# Patient Record
Sex: MALE | Race: WHITE | Employment: OTHER | URBAN - METROPOLITAN AREA
[De-identification: names, ages, dates, MRNs, and addresses within clinical notes are randomized per-mention and may not be internally consistent; named-entity substitution may affect disease eponyms.]

---

## 2023-02-26 ENCOUNTER — HOSPITAL ENCOUNTER (INPATIENT)
Age: 73
LOS: 9 days | Discharge: NURSING, REHAB OR LONG-TERM CARE | DRG: 023 | End: 2023-03-07
Attending: STUDENT IN AN ORGANIZED HEALTH CARE EDUCATION/TRAINING PROGRAM | Admitting: ANESTHESIOLOGY
Payer: MEDICARE

## 2023-02-26 ENCOUNTER — APPOINTMENT (OUTPATIENT)
Dept: GENERAL RADIOLOGY | Age: 73
End: 2023-02-26
Attending: EMERGENCY MEDICINE
Payer: MEDICARE

## 2023-02-26 ENCOUNTER — APPOINTMENT (OUTPATIENT)
Dept: VASCULAR SURGERY | Age: 73
DRG: 023 | End: 2023-02-26
Attending: NURSE PRACTITIONER
Payer: MEDICARE

## 2023-02-26 ENCOUNTER — ANESTHESIA (OUTPATIENT)
Dept: INTERVENTIONAL RADIOLOGY/VASCULAR | Age: 73
DRG: 023 | End: 2023-02-26
Payer: MEDICARE

## 2023-02-26 ENCOUNTER — HOSPITAL ENCOUNTER (EMERGENCY)
Dept: INTERVENTIONAL RADIOLOGY/VASCULAR | Age: 73
Discharge: HOME | DRG: 023 | End: 2023-02-26
Payer: MEDICARE

## 2023-02-26 ENCOUNTER — APPOINTMENT (OUTPATIENT)
Dept: CT IMAGING | Age: 73
End: 2023-02-26
Attending: EMERGENCY MEDICINE
Payer: MEDICARE

## 2023-02-26 ENCOUNTER — HOSPITAL ENCOUNTER (EMERGENCY)
Age: 73
Discharge: OTHER INSTITUTION | End: 2023-02-26
Attending: EMERGENCY MEDICINE
Payer: MEDICARE

## 2023-02-26 ENCOUNTER — APPOINTMENT (OUTPATIENT)
Dept: CT IMAGING | Age: 73
DRG: 023 | End: 2023-02-26
Attending: NURSE PRACTITIONER
Payer: MEDICARE

## 2023-02-26 ENCOUNTER — ANESTHESIA EVENT (OUTPATIENT)
Dept: INTERVENTIONAL RADIOLOGY/VASCULAR | Age: 73
DRG: 023 | End: 2023-02-26
Payer: MEDICARE

## 2023-02-26 VITALS
SYSTOLIC BLOOD PRESSURE: 130 MMHG | TEMPERATURE: 97.7 F | RESPIRATION RATE: 17 BRPM | DIASTOLIC BLOOD PRESSURE: 97 MMHG | HEART RATE: 81 BPM | OXYGEN SATURATION: 99 %

## 2023-02-26 VITALS
BODY MASS INDEX: 47.74 KG/M2 | WEIGHT: 315 LBS | HEART RATE: 84 BPM | RESPIRATION RATE: 18 BRPM | OXYGEN SATURATION: 99 % | HEIGHT: 68 IN | SYSTOLIC BLOOD PRESSURE: 149 MMHG | TEMPERATURE: 99.3 F | DIASTOLIC BLOOD PRESSURE: 92 MMHG

## 2023-02-26 DIAGNOSIS — I63.512 CEREBROVASCULAR ACCIDENT (CVA) DUE TO OCCLUSION OF LEFT MIDDLE CEREBRAL ARTERY (HCC): Primary | ICD-10-CM

## 2023-02-26 DIAGNOSIS — I63.50 CEREBROVASCULAR ACCIDENT (CVA) DUE TO OCCLUSION OF CEREBRAL ARTERY (HCC): Primary | ICD-10-CM

## 2023-02-26 PROBLEM — I63.9 STROKE (HCC): Status: ACTIVE | Noted: 2023-02-26

## 2023-02-26 LAB
ALBUMIN SERPL-MCNC: 3.5 G/DL (ref 3.5–5)
ALBUMIN/GLOB SERPL: 1.1 (ref 1.1–2.2)
ALP SERPL-CCNC: 79 U/L (ref 45–117)
ALT SERPL-CCNC: 26 U/L (ref 12–78)
ANION GAP SERPL CALC-SCNC: 5 MMOL/L (ref 5–15)
APTT PPP: 23.3 SEC (ref 21.2–34.1)
AST SERPL W P-5'-P-CCNC: ABNORMAL U/L (ref 15–37)
BASOPHILS # BLD: 0 K/UL (ref 0–0.1)
BASOPHILS NFR BLD: 0 % (ref 0–1)
BILIRUB SERPL-MCNC: 0.8 MG/DL (ref 0.2–1)
BUN SERPL-MCNC: 25 MG/DL (ref 6–20)
BUN/CREAT SERPL: 17 (ref 12–20)
CA-I BLD-MCNC: 9 MG/DL (ref 8.5–10.1)
CHLORIDE SERPL-SCNC: 110 MMOL/L (ref 97–108)
CHOLEST SERPL-MCNC: 178 MG/DL
CO2 SERPL-SCNC: 21 MMOL/L (ref 21–32)
CREAT SERPL-MCNC: 1.5 MG/DL (ref 0.7–1.3)
DIFFERENTIAL METHOD BLD: ABNORMAL
EOSINOPHIL # BLD: 0.1 K/UL (ref 0–0.4)
EOSINOPHIL NFR BLD: 1 % (ref 0–7)
ERYTHROCYTE [DISTWIDTH] IN BLOOD BY AUTOMATED COUNT: 12.8 % (ref 11.5–14.5)
EST. AVERAGE GLUCOSE BLD GHB EST-MCNC: 111 MG/DL
GLOBULIN SER CALC-MCNC: 3.2 G/DL (ref 2–4)
GLUCOSE SERPL-MCNC: 134 MG/DL (ref 65–100)
HBA1C MFR BLD: 5.5 % (ref 4–5.6)
HCT VFR BLD AUTO: 45.9 % (ref 36.6–50.3)
HDLC SERPL-MCNC: 42 MG/DL
HDLC SERPL: 4.2 (ref 0–5)
HGB BLD-MCNC: 15.6 G/DL (ref 12.1–17)
IMM GRANULOCYTES # BLD AUTO: 0.1 K/UL (ref 0–0.04)
IMM GRANULOCYTES NFR BLD AUTO: 1 % (ref 0–0.5)
INR PPP: 1 (ref 0.9–1.1)
LDLC SERPL CALC-MCNC: 107.2 MG/DL (ref 0–100)
LYMPHOCYTES # BLD: 0.7 K/UL (ref 0.8–3.5)
LYMPHOCYTES NFR BLD: 9 % (ref 12–49)
MCH RBC QN AUTO: 32 PG (ref 26–34)
MCHC RBC AUTO-ENTMCNC: 34 G/DL (ref 30–36.5)
MCV RBC AUTO: 94.3 FL (ref 80–99)
MONOCYTES # BLD: 0.4 K/UL (ref 0–1)
MONOCYTES NFR BLD: 5 % (ref 5–13)
NEUTS SEG # BLD: 7 K/UL (ref 1.8–8)
NEUTS SEG NFR BLD: 84 % (ref 32–75)
NRBC # BLD: 0 K/UL (ref 0–0.01)
NRBC BLD-RTO: 0 PER 100 WBC
PLATELET # BLD AUTO: 221 K/UL (ref 150–400)
PMV BLD AUTO: 9.1 FL (ref 8.9–12.9)
POTASSIUM SERPL-SCNC: ABNORMAL MMOL/L (ref 3.5–5.1)
PROT SERPL-MCNC: 6.7 G/DL (ref 6.4–8.2)
PROTHROMBIN TIME: 12.9 SEC (ref 11.9–14.6)
RBC # BLD AUTO: 4.87 M/UL (ref 4.1–5.7)
RBC MORPH BLD: ABNORMAL
SODIUM SERPL-SCNC: 136 MMOL/L (ref 136–145)
THERAPEUTIC RANGE,PTTT: NORMAL SEC (ref 82–109)
TRIGL SERPL-MCNC: 144 MG/DL (ref ?–150)
TROPONIN I SERPL HS-MCNC: 5 NG/L (ref 0–76)
TSH SERPL DL<=0.05 MIU/L-ACNC: 1.1 UIU/ML (ref 0.36–3.74)
VLDLC SERPL CALC-MCNC: 28.8 MG/DL
WBC # BLD AUTO: 8.3 K/UL (ref 4.1–11.1)

## 2023-02-26 PROCEDURE — 2709999900 HC NON-CHARGEABLE SUPPLY

## 2023-02-26 PROCEDURE — 74011250636 HC RX REV CODE- 250/636: Performed by: RADIOLOGY

## 2023-02-26 PROCEDURE — 71045 X-RAY EXAM CHEST 1 VIEW: CPT

## 2023-02-26 PROCEDURE — 83036 HEMOGLOBIN GLYCOSYLATED A1C: CPT

## 2023-02-26 PROCEDURE — 74011250636 HC RX REV CODE- 250/636: Performed by: NURSE ANESTHETIST, CERTIFIED REGISTERED

## 2023-02-26 PROCEDURE — 99285 EMERGENCY DEPT VISIT HI MDM: CPT

## 2023-02-26 PROCEDURE — C1894 INTRO/SHEATH, NON-LASER: HCPCS

## 2023-02-26 PROCEDURE — 77030025703 HC SYR ANGI VACLOK MRTM -A

## 2023-02-26 PROCEDURE — 74011250637 HC RX REV CODE- 250/637: Performed by: RADIOLOGY

## 2023-02-26 PROCEDURE — 4A03X5D MEASUREMENT OF ARTERIAL FLOW, INTRACRANIAL, EXTERNAL APPROACH: ICD-10-PCS | Performed by: RADIOLOGY

## 2023-02-26 PROCEDURE — 85610 PROTHROMBIN TIME: CPT

## 2023-02-26 PROCEDURE — 77030005402 HC CATH RAD ART LN KT TELE -B

## 2023-02-26 PROCEDURE — 84443 ASSAY THYROID STIM HORMONE: CPT

## 2023-02-26 PROCEDURE — 80053 COMPREHEN METABOLIC PANEL: CPT

## 2023-02-26 PROCEDURE — 99223 1ST HOSP IP/OBS HIGH 75: CPT | Performed by: RADIOLOGY

## 2023-02-26 PROCEDURE — 77030013797 HC KT TRNSDUC PRSSR EDWD -A

## 2023-02-26 PROCEDURE — 80061 LIPID PANEL: CPT

## 2023-02-26 PROCEDURE — 74011000250 HC RX REV CODE- 250: Performed by: ANESTHESIOLOGY

## 2023-02-26 PROCEDURE — 93880 EXTRACRANIAL BILAT STUDY: CPT

## 2023-02-26 PROCEDURE — 70450 CT HEAD/BRAIN W/O DYE: CPT

## 2023-02-26 PROCEDURE — C1760 CLOSURE DEV, VASC: HCPCS

## 2023-02-26 PROCEDURE — C1769 GUIDE WIRE: HCPCS

## 2023-02-26 PROCEDURE — 77030035304 HC CATH ANGI BENCHMARK PENU -G

## 2023-02-26 PROCEDURE — 74011250636 HC RX REV CODE- 250/636: Performed by: NURSE PRACTITIONER

## 2023-02-26 PROCEDURE — 74011000250 HC RX REV CODE- 250

## 2023-02-26 PROCEDURE — 65610000006 HC RM INTENSIVE CARE

## 2023-02-26 PROCEDURE — 74011000250 HC RX REV CODE- 250: Performed by: NURSE PRACTITIONER

## 2023-02-26 PROCEDURE — 94660 CPAP INITIATION&MGMT: CPT

## 2023-02-26 PROCEDURE — 84484 ASSAY OF TROPONIN QUANT: CPT

## 2023-02-26 PROCEDURE — 85730 THROMBOPLASTIN TIME PARTIAL: CPT

## 2023-02-26 PROCEDURE — 36415 COLL VENOUS BLD VENIPUNCTURE: CPT

## 2023-02-26 PROCEDURE — 74011250636 HC RX REV CODE- 250/636

## 2023-02-26 PROCEDURE — 93005 ELECTROCARDIOGRAM TRACING: CPT

## 2023-02-26 PROCEDURE — 77030012468 HC VLV BLEEDBK CNTRL ABBT -B

## 2023-02-26 PROCEDURE — 03CG3ZZ EXTIRPATION OF MATTER FROM INTRACRANIAL ARTERY, PERCUTANEOUS APPROACH: ICD-10-PCS | Performed by: PSYCHIATRY & NEUROLOGY

## 2023-02-26 PROCEDURE — 76060000033 HC ANESTHESIA 1 TO 1.5 HR

## 2023-02-26 PROCEDURE — 77030038563 HC TU ART PRESSR ICUM -Z

## 2023-02-26 PROCEDURE — 74011250637 HC RX REV CODE- 250/637: Performed by: EMERGENCY MEDICINE

## 2023-02-26 PROCEDURE — 77030008584 HC TOOL GDWRE DEV TERU -A

## 2023-02-26 PROCEDURE — 74011000636 HC RX REV CODE- 636: Performed by: EMERGENCY MEDICINE

## 2023-02-26 PROCEDURE — 74011000250 HC RX REV CODE- 250: Performed by: NURSE ANESTHETIST, CERTIFIED REGISTERED

## 2023-02-26 PROCEDURE — 94762 N-INVAS EAR/PLS OXIMTRY CONT: CPT

## 2023-02-26 PROCEDURE — 61645 PERQ ART M-THROMBECT &/NFS: CPT

## 2023-02-26 PROCEDURE — C1757 CATH, THROMBECTOMY/EMBOLECT: HCPCS

## 2023-02-26 PROCEDURE — 61645 PERQ ART M-THROMBECT &/NFS: CPT | Performed by: RADIOLOGY

## 2023-02-26 PROCEDURE — C1887 CATHETER, GUIDING: HCPCS

## 2023-02-26 PROCEDURE — 70496 CT ANGIOGRAPHY HEAD: CPT

## 2023-02-26 PROCEDURE — 85025 COMPLETE CBC W/AUTO DIFF WBC: CPT

## 2023-02-26 PROCEDURE — 74011000636 HC RX REV CODE- 636: Performed by: RADIOLOGY

## 2023-02-26 RX ORDER — SODIUM CHLORIDE 9 MG/ML
75 INJECTION, SOLUTION INTRAVENOUS CONTINUOUS
Status: DISCONTINUED | OUTPATIENT
Start: 2023-02-26 | End: 2023-02-27

## 2023-02-26 RX ORDER — LABETALOL HYDROCHLORIDE 5 MG/ML
20 INJECTION, SOLUTION INTRAVENOUS ONCE
Status: DISCONTINUED | OUTPATIENT
Start: 2023-02-26 | End: 2023-02-26

## 2023-02-26 RX ORDER — POLYETHYLENE GLYCOL 3350 17 G/17G
17 POWDER, FOR SOLUTION ORAL DAILY PRN
Status: DISCONTINUED | OUTPATIENT
Start: 2023-02-26 | End: 2023-03-07 | Stop reason: HOSPADM

## 2023-02-26 RX ORDER — LABETALOL HYDROCHLORIDE 5 MG/ML
10 INJECTION, SOLUTION INTRAVENOUS
Status: DISCONTINUED | OUTPATIENT
Start: 2023-02-26 | End: 2023-02-26

## 2023-02-26 RX ORDER — LABETALOL HYDROCHLORIDE 5 MG/ML
20 INJECTION, SOLUTION INTRAVENOUS
Status: DISCONTINUED | OUTPATIENT
Start: 2023-02-26 | End: 2023-03-01

## 2023-02-26 RX ORDER — SODIUM CHLORIDE 9 MG/ML
INJECTION, SOLUTION INTRAVENOUS
Status: DISCONTINUED | OUTPATIENT
Start: 2023-02-26 | End: 2023-02-26 | Stop reason: HOSPADM

## 2023-02-26 RX ORDER — ASPIRIN 300 MG/1
300 SUPPOSITORY RECTAL
Status: COMPLETED | OUTPATIENT
Start: 2023-02-26 | End: 2023-02-26

## 2023-02-26 RX ORDER — ROCURONIUM BROMIDE 10 MG/ML
INJECTION, SOLUTION INTRAVENOUS AS NEEDED
Status: DISCONTINUED | OUTPATIENT
Start: 2023-02-26 | End: 2023-02-26 | Stop reason: HOSPADM

## 2023-02-26 RX ORDER — ASPIRIN 300 MG/1
300 SUPPOSITORY RECTAL DAILY
Status: DISCONTINUED | OUTPATIENT
Start: 2023-02-27 | End: 2023-02-28

## 2023-02-26 RX ORDER — ACETAMINOPHEN 650 MG/1
650 SUPPOSITORY RECTAL
Status: DISCONTINUED | OUTPATIENT
Start: 2023-02-26 | End: 2023-03-07 | Stop reason: HOSPADM

## 2023-02-26 RX ORDER — SODIUM CHLORIDE 0.9 % (FLUSH) 0.9 %
5-40 SYRINGE (ML) INJECTION AS NEEDED
Status: DISCONTINUED | OUTPATIENT
Start: 2023-02-26 | End: 2023-03-07 | Stop reason: HOSPADM

## 2023-02-26 RX ORDER — ENOXAPARIN SODIUM 100 MG/ML
40 INJECTION SUBCUTANEOUS DAILY
Status: DISCONTINUED | OUTPATIENT
Start: 2023-02-27 | End: 2023-02-26 | Stop reason: DRUGHIGH

## 2023-02-26 RX ORDER — ONDANSETRON 2 MG/ML
4 INJECTION INTRAMUSCULAR; INTRAVENOUS
Status: DISCONTINUED | OUTPATIENT
Start: 2023-02-26 | End: 2023-03-07 | Stop reason: HOSPADM

## 2023-02-26 RX ORDER — ENOXAPARIN SODIUM 100 MG/ML
30 INJECTION SUBCUTANEOUS EVERY 12 HOURS
Status: DISCONTINUED | OUTPATIENT
Start: 2023-02-27 | End: 2023-03-07 | Stop reason: HOSPADM

## 2023-02-26 RX ORDER — HYDRALAZINE HYDROCHLORIDE 20 MG/ML
20 INJECTION INTRAMUSCULAR; INTRAVENOUS
Status: DISCONTINUED | OUTPATIENT
Start: 2023-02-26 | End: 2023-03-07 | Stop reason: HOSPADM

## 2023-02-26 RX ORDER — SODIUM CHLORIDE 0.9 % (FLUSH) 0.9 %
5-40 SYRINGE (ML) INJECTION EVERY 8 HOURS
Status: DISCONTINUED | OUTPATIENT
Start: 2023-02-26 | End: 2023-03-07 | Stop reason: HOSPADM

## 2023-02-26 RX ORDER — LIDOCAINE HYDROCHLORIDE 20 MG/ML
INJECTION, SOLUTION EPIDURAL; INFILTRATION; INTRACAUDAL; PERINEURAL AS NEEDED
Status: DISCONTINUED | OUTPATIENT
Start: 2023-02-26 | End: 2023-02-26 | Stop reason: HOSPADM

## 2023-02-26 RX ORDER — ACETAMINOPHEN 325 MG/1
650 TABLET ORAL
Status: DISCONTINUED | OUTPATIENT
Start: 2023-02-26 | End: 2023-03-07 | Stop reason: HOSPADM

## 2023-02-26 RX ORDER — LIDOCAINE HYDROCHLORIDE 10 MG/ML
INJECTION INFILTRATION; PERINEURAL
Status: COMPLETED
Start: 2023-02-26 | End: 2023-02-26

## 2023-02-26 RX ORDER — PHENYLEPHRINE HCL IN 0.9% NACL 0.4MG/10ML
SYRINGE (ML) INTRAVENOUS
Status: DISCONTINUED | OUTPATIENT
Start: 2023-02-26 | End: 2023-02-26 | Stop reason: HOSPADM

## 2023-02-26 RX ORDER — VERAPAMIL HYDROCHLORIDE 2.5 MG/ML
10 INJECTION, SOLUTION INTRAVENOUS
Status: ACTIVE | OUTPATIENT
Start: 2023-02-26 | End: 2023-02-26

## 2023-02-26 RX ORDER — ONDANSETRON 4 MG/1
4 TABLET, ORALLY DISINTEGRATING ORAL
Status: DISCONTINUED | OUTPATIENT
Start: 2023-02-26 | End: 2023-03-07 | Stop reason: HOSPADM

## 2023-02-26 RX ORDER — LABETALOL HCL 20 MG/4 ML
10 SYRINGE (ML) INTRAVENOUS
Status: DISCONTINUED | OUTPATIENT
Start: 2023-02-26 | End: 2023-02-26

## 2023-02-26 RX ORDER — ESMOLOL HYDROCHLORIDE 10 MG/ML
INJECTION INTRAVENOUS AS NEEDED
Status: DISCONTINUED | OUTPATIENT
Start: 2023-02-26 | End: 2023-02-26 | Stop reason: HOSPADM

## 2023-02-26 RX ORDER — SUCCINYLCHOLINE CHLORIDE 20 MG/ML
INJECTION INTRAMUSCULAR; INTRAVENOUS AS NEEDED
Status: DISCONTINUED | OUTPATIENT
Start: 2023-02-26 | End: 2023-02-26 | Stop reason: HOSPADM

## 2023-02-26 RX ORDER — LIDOCAINE HYDROCHLORIDE 10 MG/ML
10 INJECTION INFILTRATION; PERINEURAL
Status: ACTIVE | OUTPATIENT
Start: 2023-02-26 | End: 2023-02-26

## 2023-02-26 RX ORDER — PROPOFOL 10 MG/ML
INJECTION, EMULSION INTRAVENOUS AS NEEDED
Status: DISCONTINUED | OUTPATIENT
Start: 2023-02-26 | End: 2023-02-26 | Stop reason: HOSPADM

## 2023-02-26 RX ORDER — ASPIRIN 300 MG/1
600 SUPPOSITORY RECTAL
Status: COMPLETED | OUTPATIENT
Start: 2023-02-26 | End: 2023-02-26

## 2023-02-26 RX ADMIN — SODIUM CHLORIDE: 900 INJECTION, SOLUTION INTRAVENOUS at 10:42

## 2023-02-26 RX ADMIN — Medication 4000 UNITS: at 11:00

## 2023-02-26 RX ADMIN — LIDOCAINE HYDROCHLORIDE 8 ML: 10 INJECTION, SOLUTION INFILTRATION; PERINEURAL at 11:46

## 2023-02-26 RX ADMIN — SODIUM CHLORIDE, PRESERVATIVE FREE 10 ML: 5 INJECTION INTRAVENOUS at 13:38

## 2023-02-26 RX ADMIN — NICARDIPINE HYDROCHLORIDE 15 MG/HR: 25 INJECTION, SOLUTION INTRAVENOUS at 20:45

## 2023-02-26 RX ADMIN — Medication 4000 UNITS: at 10:57

## 2023-02-26 RX ADMIN — Medication 4000 UNITS: at 10:58

## 2023-02-26 RX ADMIN — LABETALOL HYDROCHLORIDE 10 MG: 5 INJECTION INTRAVENOUS at 20:13

## 2023-02-26 RX ADMIN — Medication 4000 UNITS: at 11:01

## 2023-02-26 RX ADMIN — NICARDIPINE HYDROCHLORIDE 15 MG/HR: 25 INJECTION, SOLUTION INTRAVENOUS at 18:47

## 2023-02-26 RX ADMIN — ASPIRIN 300 MG: 300 SUPPOSITORY RECTAL at 09:18

## 2023-02-26 RX ADMIN — LIDOCAINE HYDROCHLORIDE 100 MG: 20 INJECTION, SOLUTION EPIDURAL; INFILTRATION; INTRACAUDAL; PERINEURAL at 10:45

## 2023-02-26 RX ADMIN — ROCURONIUM BROMIDE 10 MG: 10 SOLUTION INTRAVENOUS at 10:45

## 2023-02-26 RX ADMIN — SODIUM CHLORIDE, PRESERVATIVE FREE 10 ML: 5 INJECTION INTRAVENOUS at 22:22

## 2023-02-26 RX ADMIN — Medication 4000 UNITS: at 11:02

## 2023-02-26 RX ADMIN — HYDRALAZINE HYDROCHLORIDE 20 MG: 20 INJECTION INTRAMUSCULAR; INTRAVENOUS at 22:22

## 2023-02-26 RX ADMIN — NICARDIPINE HYDROCHLORIDE 5 MG/HR: 25 INJECTION, SOLUTION INTRAVENOUS at 12:54

## 2023-02-26 RX ADMIN — NICARDIPINE HYDROCHLORIDE 15 MG/HR: 25 INJECTION, SOLUTION INTRAVENOUS at 22:45

## 2023-02-26 RX ADMIN — Medication 4000 UNITS: at 10:59

## 2023-02-26 RX ADMIN — SODIUM CHLORIDE 75 ML/HR: 9 INJECTION, SOLUTION INTRAVENOUS at 13:52

## 2023-02-26 RX ADMIN — PROPOFOL 50 MG: 10 INJECTION, EMULSION INTRAVENOUS at 11:37

## 2023-02-26 RX ADMIN — HYDRALAZINE HYDROCHLORIDE 20 MG: 20 INJECTION INTRAMUSCULAR; INTRAVENOUS at 16:19

## 2023-02-26 RX ADMIN — PROPOFOL 200 MG: 10 INJECTION, EMULSION INTRAVENOUS at 10:45

## 2023-02-26 RX ADMIN — IOPAMIDOL 82 ML: 612 INJECTION, SOLUTION INTRAVENOUS at 11:42

## 2023-02-26 RX ADMIN — SUCCINYLCHOLINE CHLORIDE 200 MG: 20 INJECTION, SOLUTION INTRAMUSCULAR; INTRAVENOUS at 10:45

## 2023-02-26 RX ADMIN — ESMOLOL HYDROCHLORIDE 30 MG: 10 INJECTION, SOLUTION INTRAVENOUS at 11:58

## 2023-02-26 RX ADMIN — IOPAMIDOL 100 ML: 755 INJECTION, SOLUTION INTRAVENOUS at 08:26

## 2023-02-26 RX ADMIN — Medication 25 MCG/MIN: at 11:05

## 2023-02-26 RX ADMIN — ESMOLOL HYDROCHLORIDE 30 MG: 10 INJECTION, SOLUTION INTRAVENOUS at 12:09

## 2023-02-26 RX ADMIN — SODIUM CHLORIDE, PRESERVATIVE FREE 10 ML: 5 INJECTION INTRAVENOUS at 22:23

## 2023-02-26 RX ADMIN — NICARDIPINE HYDROCHLORIDE 15 MG/HR: 25 INJECTION, SOLUTION INTRAVENOUS at 17:10

## 2023-02-26 RX ADMIN — NICARDIPINE HYDROCHLORIDE 15 MG/HR: 25 INJECTION, SOLUTION INTRAVENOUS at 15:18

## 2023-02-26 RX ADMIN — ASPIRIN 600 MG: 300 SUPPOSITORY RECTAL at 10:59

## 2023-02-26 NOTE — ED TRIAGE NOTES
Arrives to ED with right sided weakness/ flaccid, garbled speech. ASIF Dash [id number] when patient talked to wife on phone, was found on floor leaned against couch at 0630 this morning by wife.      Bg 134

## 2023-02-26 NOTE — ED PROVIDER NOTES
EMERGENCY DEPARTMENT HISTORY AND PHYSICAL EXAM      Date: 2/26/2023  Patient Name: Rebecca Morocho III    History of Presenting Illness     Chief Complaint   Patient presents with    Extremity Weakness       History Provided By: EMS    HPI: Rafy Jacobson, 81 y.o. male with a past medical history significant hypertension presents to the ED with chief complaint of Extremity Weakness  . 26-year-old male spoke to his wife at 11:45 PM last night and was well went to sleep upon her returning from work around 6:37 AM she noted him to be on the ground slumped over altered with new weakness. EMS was called. Presents as a level 2 stroke alert. Patient's not on anticoagulation. There are no other complaints, changes, or physical findings at this time. PCP: None        Past History     Past Medical History:  No past medical history on file. Past Surgical History:  No past surgical history on file. Family History:  No family history on file. Social History: Allergies:  No Known Allergies      Review of Systems   Review of Systems   Constitutional: Negative. Negative for chills, fatigue and fever. HENT: Negative. Negative for congestion, ear pain, nosebleeds and sore throat. Eyes: Negative. Negative for pain, discharge and visual disturbance. Respiratory: Negative. Negative for cough, chest tightness and shortness of breath. Cardiovascular: Negative. Negative for chest pain and leg swelling. Gastrointestinal: Negative. Negative for abdominal pain, blood in stool, constipation, diarrhea, nausea and vomiting. Endocrine: Negative. Genitourinary: Negative. Negative for difficulty urinating, dysuria and flank pain. Musculoskeletal: Negative. Negative for back pain and myalgias. Skin: Negative. Negative for rash and wound. Allergic/Immunologic: Negative. Neurological:  Positive for speech difficulty, weakness and numbness.  Negative for dizziness, syncope and headaches. Hematological: Negative. Does not bruise/bleed easily. Psychiatric/Behavioral: Negative. Negative for agitation, confusion, hallucinations and suicidal ideas. All other systems reviewed and are negative. Positives and Pertinent negatives as per HPI. Physical Exam   Patient Vitals for the past 24 hrs:   Temp Pulse Resp BP SpO2   02/26/23 0923 -- 84 18 (!) 149/92 99 %   02/26/23 0831 -- 83 15 (!) 143/80 98 %   02/26/23 0804 99.3 °F (37.4 °C) 86 18 (!) 155/82 98 %         Physical Exam  Vitals and nursing note reviewed. Constitutional:       General: He is not in acute distress. Appearance: He is normal weight. He is not ill-appearing. HENT:      Head: Normocephalic and atraumatic. Right Ear: External ear normal.      Left Ear: External ear normal.      Nose: Nose normal. No rhinorrhea. Mouth/Throat:      Mouth: Mucous membranes are moist.      Pharynx: Oropharynx is clear. Eyes:      Extraocular Movements: Extraocular movements intact. Conjunctiva/sclera: Conjunctivae normal.      Pupils: Pupils are equal, round, and reactive to light. Cardiovascular:      Rate and Rhythm: Normal rate and regular rhythm. Pulses: Normal pulses. Heart sounds: Normal heart sounds. Pulmonary:      Effort: Pulmonary effort is normal. No respiratory distress. Breath sounds: Normal breath sounds. Abdominal:      General: Abdomen is flat. Bowel sounds are normal.      Palpations: Abdomen is soft. Musculoskeletal:         General: No tenderness or deformity. Normal range of motion. Cervical back: Normal range of motion and neck supple. Skin:     General: Skin is warm and dry. Capillary Refill: Capillary refill takes less than 2 seconds. Findings: No bruising, lesion or rash. Neurological:      General: No focal deficit present. Mental Status: He is alert and oriented to person, place, and time. Mental status is at baseline.       Comments: ASIF sided weakness. Dysarthria facial droop   Psychiatric:         Mood and Affect: Mood normal.         Behavior: Behavior normal.         Thought Content: Thought content normal.         Judgment: Judgment normal.       Diagnostic Study Results     LABS:   Recent Results (from the past 12 hour(s))   CBC WITH AUTOMATED DIFF    Collection Time: 02/26/23  7:58 AM   Result Value Ref Range    WBC 8.3 4.1 - 11.1 K/uL    RBC 4.87 4.10 - 5.70 M/uL    HGB 15.6 12.1 - 17.0 g/dL    HCT 45.9 36.6 - 50.3 %    MCV 94.3 80.0 - 99.0 FL    MCH 32.0 26.0 - 34.0 PG    MCHC 34.0 30.0 - 36.5 g/dL    RDW 12.8 11.5 - 14.5 %    PLATELET 973 324 - 031 K/uL    MPV 9.1 8.9 - 12.9 FL    NRBC 0.0 0.0  WBC    ABSOLUTE NRBC 0.00 0.00 - 0.01 K/uL    NEUTROPHILS 84 (H) 32 - 75 %    LYMPHOCYTES 9 (L) 12 - 49 %    MONOCYTES 5 5 - 13 %    EOSINOPHILS 1 0 - 7 %    BASOPHILS 0 0 - 1 %    IMMATURE GRANULOCYTES 1 (H) 0 - 0.5 %    ABS. NEUTROPHILS 7.0 1.8 - 8.0 K/UL    ABS. LYMPHOCYTES 0.7 (L) 0.8 - 3.5 K/UL    ABS. MONOCYTES 0.4 0.0 - 1.0 K/UL    ABS. EOSINOPHILS 0.1 0.0 - 0.4 K/UL    ABS. BASOPHILS 0.0 0.0 - 0.1 K/UL    ABS. IMM. GRANS. 0.1 (H) 0.00 - 0.04 K/UL    DF AUTOMATED      RBC COMMENTS Normocytic, Normochromic     METABOLIC PANEL, COMPREHENSIVE    Collection Time: 02/26/23  7:58 AM   Result Value Ref Range    Sodium 136 136 - 145 mmol/L    Potassium Hemolyzed, recollect requested 3.5 - 5.1 mmol/L    Chloride 110 (H) 97 - 108 mmol/L    CO2 21 21 - 32 mmol/L    Anion gap 5 5 - 15 mmol/L    Glucose 134 (H) 65 - 100 mg/dL    BUN 25 (H) 6 - 20 mg/dL    Creatinine 1.50 (H) 0.70 - 1.30 mg/dL    BUN/Creatinine ratio 17 12 - 20      eGFR 50 (L) >60 ml/min/1.73m2    Calcium 9.0 8.5 - 10.1 mg/dL    Bilirubin, total 0.8 0.2 - 1.0 mg/dL    AST (SGOT) Hemolyzed, recollect requested 15 - 37 U/L    ALT (SGPT) 26 12 - 78 U/L    Alk.  phosphatase 79 45 - 117 U/L    Protein, total 6.7 6.4 - 8.2 g/dL    Albumin 3.5 3.5 - 5.0 g/dL    Globulin 3.2 2.0 - 4.0 g/dL A-G Ratio 1.1 1.1 - 2.2     PROTHROMBIN TIME + INR    Collection Time: 02/26/23  7:58 AM   Result Value Ref Range    Prothrombin time 12.9 11.9 - 14.6 sec    INR 1.0 0.9 - 1.1     PTT    Collection Time: 02/26/23  7:58 AM   Result Value Ref Range    aPTT 23.3 21.2 - 34.1 sec    aPTT, therapeutic range   82 - 109 sec   TROPONIN-HIGH SENSITIVITY    Collection Time: 02/26/23  7:58 AM   Result Value Ref Range    Troponin-High Sensitivity 5 0 - 76 ng/L        EKG: EKG interpreted independently by ER physician. EKG at 7:52 AM normal sinus rhythm rate of 80. No ST changes. No T wave inversions. Normal intervals. Reasonable dysrhythmia. Interpreted by ER physician. RADIOLOGY:  Non-plain film images such as CT, Ultrasound and MRI are read by the radiologist. Plain radiographic images are visualized and preliminarily interpreted by the ED Provider with the below findings:     Chest x-ray reviewed independently by ER physician. No evidence of pneumonia, pleural effusion, rib fracture or pneumothorax. No widened mediastinum. Negative acute. I do agree with radiology interpretation. Interpretation per the Radiologist below, if available at the time of this note:     XR CHEST PORT    Result Date: 2/26/2023  INDICATION: Infarct. Portable AP view of the chest. There is no prior study for direct comparison. Cardiomediastinal silhouette is prominent, likely secondary to patient rotation. Lungs are grossly clear bilaterally. Pleural spaces are normal and there is no pneumothorax. Osseous structures are intact. Grossly clear lungs. CTA CODE NEURO HEAD AND NECK W CONT    Result Date: 2/26/2023  *PRELIMINARY REPORT* Thrombus in the distal left middle cerebral artery. Acute versus subacute infarct in the left MCA territory involves left basal ganglia, left insula, left frontal lobe, and left temporal lobe. Severe stenosis of the proximal left internal carotid artery.  Preliminary report was provided by  Joaquín, the on-call radiologist, at 0912 hours Final report to follow. *END PRELIMINARY REPORT* EXAM: CTA CODE NEURO HEAD AND NECK W CONT INDICATION: Code Stroke COMPARISON: None. CONTRAST: 100 mL of Isovue-370. TECHNIQUE:  Unenhanced  images were obtained to localize the volume for acquisition. Multislice helical axial CT angiography was performed from the aortic arch to the top of the head during uneventful rapid bolus intravenous contrast administration. Coronal and sagittal reformations and 3D post processing was performed. CT dose reduction was achieved through use of a standardized protocol tailored for this examination and automatic exposure control for dose modulation. This study was analyzed by the [address] ai algorithm. FINDINGS: DELAYED ENHANCEMENT HEAD CT: No abnormal parenchymal enhancement. No mass effect or midline shift. Left MCA infarction. CTA NECK: Great vessels: Normal arch anatomy with the origins patent. Right subclavian artery: Patent Left subclavian artery: Patent Right common carotid artery: Patent Left common carotid artery: Patent Cervical right internal carotid artery: Patent with less than 50% stenosis by NASCET criteria. Cervical left internal carotid artery: Patent with greater than 90% stenosis by NASCET criteria. Right vertebral artery: Patent Left vertebral artery: Patent The lung apices are clear. The thyroid is homogeneous. No cervical lymphadenopathy. CTA HEAD: Right cavernous internal carotid artery: Patent Left cavernous internal carotid artery: Patent Anterior cerebral arteries: Patent Anterior communicating artery: Patent Right middle cerebral artery: Patent Left middle cerebral artery: Occlusion of the distal left MCA at distal M1 segment. Small amount of thrombus in the M2 segment within sylvian fissure.  Posterior communicating arteries: Patent Posterior cerebral arteries: Patent Basilar artery: Patent Distal vertebral arteries: Patent No evidence for intracranial aneurysm or hemodynamically significant stenosis. 1.  Occlusion of distal M1 segment of left MCA. 2.  Left MCA territory infarction. 3.  Severe stenosis left cervical ICA. Mild stenosis right cervical ICA. CT CODE NEURO HEAD WO CONTRAST    Result Date: 2/26/2023  EXAM:  CT CODE NEURO HEAD WO CONTRAST INDICATION: Right extremity weakness and difficulty speaking. Last known well at 11:45 PM last night. COMPARISON: None TECHNIQUE: Noncontrast head CT. Coronal and sagittal reformats. CT dose reduction was achieved through the use of a standardized protocol tailored for this examination and automatic exposure control for dose modulation. FINDINGS: The ventricles and sulci are age-appropriate without hydrocephalus. There is no mass effect or midline shift. There is no intracranial hemorrhage or extra-axial fluid collection. Hypoattenuation in the left temporal lobe and left insula includes loss of gray-white differentiation. Dense left MCA sign. The calvarium is intact. The visualized paranasal sinuses and mastoid air cells are clear. CT evidence of left MCA territory infarct and probable MCA thrombus. No intracranial hemorrhage. I discussed this impression with SHARAD Leblanc at 0840 hours on 2/26/2023. Message posted on [employer] smiley. CT Results  (Last 48 hours)                 02/26/23 0826  CTA CODE NEURO HEAD AND NECK W CONT Final result    Impression:  1. Occlusion of distal M1 segment of left MCA. 2.  Left MCA territory infarction. 3.  Severe stenosis left cervical ICA. Mild stenosis right cervical ICA. Narrative:  *PRELIMINARY REPORT*       Thrombus in the distal left middle cerebral artery. Acute versus subacute   infarct in the left MCA territory involves left basal ganglia, left insula, left   frontal lobe, and left temporal lobe. Severe stenosis of the proximal left   internal carotid artery.        Preliminary report was provided by Dr. Milla Fisher, the on-call radiologist, at 0912   hours       Final report to follow. *END PRELIMINARY REPORT*       EXAM: CTA CODE NEURO HEAD AND NECK W CONT       INDICATION: Code Stroke       COMPARISON: None. CONTRAST: 100 mL of Isovue-370. TECHNIQUE:  Unenhanced  images were obtained to localize the volume for   acquisition. Multislice helical axial CT angiography was performed from the   aortic arch to the top of the head during uneventful rapid bolus intravenous   contrast administration. Coronal and sagittal reformations and 3D post   processing was performed. CT dose reduction was achieved through use of a   standardized protocol tailored for this examination and automatic exposure   control for dose modulation. This study was analyzed by the [address] ai algorithm. FINDINGS:       DELAYED ENHANCEMENT HEAD CT:    No abnormal parenchymal enhancement. No mass effect or midline shift. Left MCA   infarction. CTA NECK:   Great vessels: Normal arch anatomy with the origins patent. Right subclavian artery: Patent   Left subclavian artery: Patent    Right common carotid artery: Patent    Left common carotid artery: Patent    Cervical right internal carotid artery: Patent with less than 50% stenosis by   NASCET criteria. Cervical left internal carotid artery: Patent with greater than 90% stenosis by   NASCET criteria. Right vertebral artery: Patent   Left vertebral artery: Patent   The lung apices are clear. The thyroid is homogeneous. No cervical   lymphadenopathy. CTA HEAD:   Right cavernous internal carotid artery: Patent   Left cavernous internal carotid artery: Patent   Anterior cerebral arteries: Patent   Anterior communicating artery: Patent   Right middle cerebral artery: Patent   Left middle cerebral artery: Occlusion of the distal left MCA at distal M1   segment. Small amount of thrombus in the M2 segment within sylvian fissure.    Posterior communicating arteries: Patent   Posterior cerebral arteries: Patent   Basilar artery: Patent   Distal vertebral arteries: Patent   No evidence for intracranial aneurysm or hemodynamically significant stenosis. 02/26/23 4560  CT CODE NEURO HEAD WO CONTRAST Final result    Impression:      CT evidence of left MCA territory infarct and probable MCA thrombus. No   intracranial hemorrhage. I discussed this impression with SHARAD Capps at 0840 hours on 2/26/2023. Message   posted on [employer] smile[employer]. Narrative:  EXAM:  CT CODE NEURO HEAD WO CONTRAST       INDICATION: Right extremity weakness and difficulty speaking. Last known well at   11:45 PM last night. COMPARISON: None       TECHNIQUE: Noncontrast head CT. Coronal and sagittal reformats. CT dose   reduction was achieved through the use of a standardized protocol tailored for   this examination and automatic exposure control for dose modulation. FINDINGS: The ventricles and sulci are age-appropriate without hydrocephalus. There is no mass effect or midline shift. There is no intracranial hemorrhage or   extra-axial fluid collection. Hypoattenuation in the left temporal lobe and left   insula includes loss of gray-white differentiation. Dense left MCA sign. The calvarium is intact. The visualized paranasal sinuses and mastoid air cells   are clear. CXR Results  (Last 48 hours)                 02/26/23 0908  XR CHEST PORT Final result    Impression:  Grossly clear lungs. Narrative:  INDICATION: Infarct. Portable AP view of the chest.       There is no prior study for direct comparison. Cardiomediastinal silhouette is prominent, likely secondary to patient rotation. Lungs are grossly clear bilaterally. Pleural spaces are normal and there is no   pneumothorax. Osseous structures are intact.                    Medical Decision Making and ED Course       CC/HPI Summary, DDx, ED Course, and Reassessment: 72-year-old male presents with new right-sided weakness facial droop slurred speech concerning for a stroke. Based on time he is a level 2 stroke alert. Differential includes mass versus ICH versus stroke versus large vessel occlusion. Plan for lab work including CBC CMP troponin CT head Noncon CTA head and neck neuro stroke protocol. We will consult neurology immediately. These orders were placed during the ER evaluation:  Orders Placed This Encounter    CT CODE NEURO HEAD WO CONTRAST     Standing Status:   Standing     Number of Occurrences:   1     Order Specific Question:   Transport     Answer:   Stretcher [5]     Order Specific Question:   Reason for Exam     Answer:   stroke alert     Order Specific Question:   Decision Support Exception     Answer:   Emergency Medical Condition (MA) [1]    CTA CODE NEURO HEAD AND NECK W CONT     Standing Status:   Standing     Number of Occurrences:   1     Order Specific Question:   Does patient have history of Renal Disease?      Answer:   No     Order Specific Question:   Reason for Exam     Answer:   Code Stroke     Order Specific Question:   Transport     Answer:   Stretcher [5]     Order Specific Question:   Decision Support Exception     Answer:   Emergency Medical Condition (MA) [1]    XR CHEST PORT     Standing Status:   Standing     Number of Occurrences:   1     Order Specific Question:   Reason for Exam     Answer:   CVA    IR PERC ART MECH THROMB INFUSION INTRACRANIAL     Standing Status:   Standing     Number of Occurrences:   1     Order Specific Question:   Transport     Answer:   Stretcher [5]     Order Specific Question:   Reason for Exam     Answer:   Stroke    CBC WITH AUTOMATED DIFF     Standing Status:   Standing     Number of Occurrences:   1    METABOLIC PANEL, COMPREHENSIVE     Standing Status:   Standing     Number of Occurrences:   1    PT + INR     Standing Status:   Standing     Number of Occurrences:   1    PTT     Standing Status:   Standing     Number of Occurrences:   1    TROPONIN-HIGH SENSITIVITY     Standing Status:   Standing     Number of Occurrences:   1    DIET NPO     Standing Status:   Standing     Number of Occurrences:   1    NURSING-MISCELLANEOUS: Notify Charge Nurse for immediate rooming. ONE TIME     Standing Status:   Standing     Number of Occurrences:   1     Order Specific Question:   Description of Order:     Answer:   Notify Charge Nurse for immediate rooming. NIH STROKE SCALE ASSESSMENT(FIRST Neuro Assessment)     Standing Status:   Standing     Number of Occurrences:   1    NURSING ASSESSMENT:  SPECIFY Neuro Checks ONE TIME STAT     Standing Status:   Standing     Number of Occurrences:   1     Order Specific Question:   Please describe the test or procedure you would like to order. Answer:   Neuro Checks    NOTIFY PROVIDER: SPECIFY ONE TIME STAT     Standing Status:   Standing     Number of Occurrences:   1    VITAL SIGNS (Per Protocol)     Standing Status:   Standing     Number of Occurrences:   1    POC GLUCOSE     Standing Status:   Standing     Number of Occurrences:   1    CARDIAC MONITOR - ED ONLY     Standing Status:   Standing     Number of Occurrences:   1     Order Specific Question:   Type:      Answer:   Bedside    PULSE OXIMETRY CONTINUOUS     Standing Status:   Standing     Number of Occurrences:   1    VITAL SIGNS     Standing Status:   Standing     Number of Occurrences:   1    INITIATE NURSING DYSPHAGIA SCREENING     Standing Status:   Standing     Number of Occurrences:   1    MEASURE HEIGHT     Standing Status:   Standing     Number of Occurrences:   1    WEIGH PATIENT     Standing Status:   Standing     Number of Occurrences:   1    NEUROLOGIC STATUS ASSESSMENT     Standing Status:   Standing     Number of Occurrences:   1    NIH STROKE SCALE ASSESSMENT     Standing Status:   Standing     Number of Occurrences:   1    ELEVATE HEAD OF BED     Standing Status:   Standing     Number of Occurrences:   1     Order Specific Question:   Degrees of Elevation     Answer:   30    OXYGEN CANNULA     Titrate up to 4l/minute to maintain oxygen saturations at 90% or greater     Standing Status:   Standing     Number of Occurrences:   1     Order Specific Question:   Liters per minute: Answer:   2     Order Specific Question:   Indications for O2 therapy     Answer:   OTHER    POC GLUCOSE     Standing Status:   Standing     Number of Occurrences:   1    EKG, 12 LEAD, INITIAL     Standing Status:   Standing     Number of Occurrences:   1     Order Specific Question:   Reason for Exam:     Answer:   CVA    SAMPLE TO BLOOD BANK     Standing Status:   Standing     Number of Occurrences:   1    DYSPHAGIA SCREENING Prior to PO     Standing Status:   Standing     Number of Occurrences:   1    SALINE LOCK IV ONE TIME STAT     Standing Status:   Standing     Number of Occurrences:   1    SALINE LOCK IV ONE TIME STAT     Standing Status:   Standing     Number of Occurrences:   1    iopamidoL (ISOVUE-370) 370 mg iodine /mL (76 %) injection 100 mL    aspirin (ASA) suppository 300 mg        Patient was given the following medications:  Medications   iopamidoL (ISOVUE-370) 370 mg iodine /mL (76 %) injection 100 mL (100 mL IntraVENous Given 2/26/23 0826)   aspirin (ASA) suppository 300 mg (300 mg Rectal Given 2/26/23 0918)           ED Course:              Vital Signs-Reviewed the patient's vital signs. Patient Vitals for the past 24 hrs:   Temp Pulse Resp BP SpO2   02/26/23 0923 -- 84 18 (!) 149/92 99 %   02/26/23 0831 -- 83 15 (!) 143/80 98 %   02/26/23 0804 99.3 °F (37.4 °C) 86 18 (!) 155/82 98 %     Vitals interpreted independently by ER physician. stable    Medical decision making tools:       Nih:    2/26/2023    08:05:28   NIH Stroke Scale     Interval Baseline   Level of Conciousness (1a) Alert   LOC Questions (1b) Answers one question correctlyLOC Questions (1b). Answers one question correctly.  Data is abnormal. Taken on 2/26/23 08:05:28   LOC Commands (1c) Performs one task correctlyLOC Commands (1c). Performs one task correctly. Data is abnormal. Taken on 2/26/23 08:05:28   Best Gaze (2) Normal   Visual (3) No visual loss   Facial Palsy (4) Partial paralysisFacial Palsy (4). Partial paralysis. Data is abnormal. Taken on 2/26/23 08:05:28   Motor Arm, Left (5a) No drift   Motor Arm, Right (5b) No movementMotor Arm, Right (5b). No movement. Data is abnormal. Taken on 2/26/23 08:05:28   Motor Leg, Left (6a) No drift   Motor Leg, Right (6b) No movementMotor Leg, Right (6b). No movement. Data is abnormal. Taken on 2/26/23 08:05:28   Limb Ataxia (7) Present in two limbsLimb Ataxia (7). Present in two limbs. Data is abnormal. Taken on 2/26/23 08:05:28   Sensory (8) Mild to ModerateSensory (8). Mild to Moderate. Data is abnormal. Taken on 2/26/23 08:05:28   Best Language (9) No aphasia   Dysarthria (10) Mild to moderate, slurs some wordsDysarthria (10). Mild to moderate, slurs some words. Data is abnormal. Taken on 2/26/23 08:05:28   Extinction and Inattention (11) No abnormality   Total 16            No data recorded          Disposition Considerations (Tests not done, Shared Decision Making, Pt Expectation of Test or Tx.):-year-old male presents as a level 2 stroke alert. Patient is not a tPA or TNK candidate secondary to duration of time of more than 4-1/2 hours. Patient immediately after having a glucose at bedside and vitals went to CT CT does confirm a large vessel occlusion. Initial suspicion of prompted me to consult neuro interventional at [facility] discussed the case with Dr. Dayan Dacosta. He reviewed the scan and immediately agrees requesting to have patient transferred. Discussed the case with ER physician at [facility] AND [facility]. He accepts the transfer. Discussed the case with neuro SOC was also been reviewing the patient at the same time with family at bedside agrees on the transfer. Requesting aspirin ND which has been ordered. Patient is maintaining his airway. Blood pressure remains systolic of 048D to 876R over 90s. No blood pressure intervention is indicated. Patient not able to be transferred via helicopter secondary to weather. Ground transport immediately here to take patient to [facility] as a thrombectomy candidate. CONSULTS: (Who and What was discussed)  None  Neuro IR  NeuroSOC  [facility] ED physician    Chronic Conditions: unknown    Social Determinants affecting Dx or Tx: None    Records Reviewed (source and summary of external notes): Prior medical records and Previous Radiology studies  Records Reviewed: Previous Hospital chart. EMS run report. I reviewed the vital signs, available nursing notes, past medical history, past surgical history, family history and social history. Initial assessment performed. The patients presenting problems have been discussed, and they are in agreement with the care plan formulated and outlined with them. I have encouraged them to ask questions as they arise throughout their visit. Transferred to Another Facility      Procedures       CRITICAL CARE NOTE :  10:39 AM  Amount of Critical Care Time: 55 minutes    IMPENDING DETERIORATION -Airway, Respiratory, Cardiovascular, and CNS  ASSOCIATED RISK FACTORS - CNS Decompensation  MANAGEMENT- Bedside Assessment, Supervision of Care, and Transfer  INTERPRETATION -  CT Scan  INTERVENTIONS - hemodynamic mngmt and Neurologic interventions   CASE REVIEW - Medical Sub-Specialist, Nursing, and Family  TREATMENT RESPONSE -Unchanged   PERFORMED BY - Self    NOTES   :  I have spent critical care time involved in lab review, consultations with specialist, family decision- making, bedside attention and documentation. This time excludes time spent in any separate billed procedures. During this entire length of time I was immediately available to the patient .     Guido Soto MD            Disposition       Emergency Department Disposition: Transferred to Another Facility      Diagnosis     Clinical Impression:   1. Cerebrovascular accident (CVA) due to occlusion of cerebral artery [facility])        Attestations:    Maria Del Carmen Cox MD    Please note that this dictation was completed with [employer], the computer voice recognition software. Quite often unanticipated grammatical, syntax, homophones, and other interpretive errors are inadvertently transcribed by the computer software. Please disregard these errors. Please excuse any errors that have escaped final proofreading. Thank you.

## 2023-02-26 NOTE — PROGRESS NOTES
Lovenox Monitoring  Indication: DVT Prophylaxis  Recent Labs     02/26/23  0758   HGB 15.6      INR 1.0   CREA 1.50*     Current Weight: 145 kg  Est. CrCl = 64 ml/min  Current Dose: 40 mg subcutaneously every 24 hours.   Plan: Change to 30 mg q12h for weight 101-150.9 kg

## 2023-02-26 NOTE — ANESTHESIA POSTPROCEDURE EVALUATION
* No procedures listed *.    general    Anesthesia Post Evaluation      Multimodal analgesia: multimodal analgesia used between 6 hours prior to anesthesia start to PACU discharge  Patient location during evaluation: PACU  Level of consciousness: awake  Pain management: adequate  Airway patency: patent  Anesthetic complications: no  Cardiovascular status: acceptable  Respiratory status: acceptable  Hydration status: acceptable  Post anesthesia nausea and vomiting:  none  Final Post Anesthesia Temperature Assessment:  Normothermia (36.0-37.5 degrees C)      INITIAL Post-op Vital signs: No vitals data found for the desired time range.

## 2023-02-26 NOTE — ROUTINE PROCESS
Date/Time Last Known Well Time: 2/25 2330    Date/Time Discovery of Stroke Symptoms: 2/26 0630    NIHSS upon arrival: 18    Time to IR Suite: 1015    Time of Arterial Puncture: 1054    Start of IA Infusion of tPA or  1st pass of recanalization device in   Target vessel: 1110    2nd pass: 1121    3rd pass : 1133    Time of Revascularization: 1138    Pretreatment TICI: 0    Post treatment TICI: 2b    Procedure Stop Time(When sterile drape is off): 1145    Time of first set of VS, neuro cks, groin, and pulse checks: 1145    Time transfer to ICU: 1152    Time of last VS, neuro, groin, and pulse checks documentation before ICU caring for pt: 1200        TRANSFER - OUT REPORT:    [facility]name[facility] on  being transferred to ICU 11(unit) for routine post - op       Report consisted of patients Situation, Background, Assessment and   Recommendations(SBAR). Information from the following report(s) SBAR, Kardex, and Procedure Summary was reviewed with the receiving nurse. Lines:       Opportunity for questions and clarification was provided.       Patient transported with:   Monitor  O2 @ 4 liters  Registered Nurse  CRNA

## 2023-02-26 NOTE — BRIEF OP NOTE
NEUROINTERVENTIONAL SURGERY BRIEF POSTOPERATIVE NOTE    Pre-Op Diagnosis: Stroke    Post-Op Diagnosis: Same as preoperative diagnosis. PROCEDURE:  Cerebral angiogram  Transarterial mechanical thrombectomy, CNS: Aspiration and TRAP technique in left MCA  Placement of arteriotomy closure device    VESSEL(S) STUDIED:  Left CCA VESSEL(S) TREATED:  Left MCA      CSC METRICS:     1. GROIN PUNCTURE TIME: 1054     2. FIRST PASS TIME: 1110     3. FINAL RECANALIZATION TIME: 8889     4. PRE-TREATMENT TICI SCORE: 0     5. POST-TREATMENT TICI SCORE: 2b    PRELIMINARY REPORT & DISPOSITION:   Moderate to severe left ICA stenosis. Tortuous left ICA. Left M1 occlusion. Status post mechanical thrombectomy comprising Zoom 71 aspiration in left M1 segment, Zoom 55 aspiration in left MCA inferior division M2, and TRAP technique using a Trevo 3 x 32 stent-retriever and Zoom aspiration catheter in left M2 and M1 segments, respectively, resulting in TICI 2b reperfusion. 600 mg aspirin administered WI at start of procedure. COMPLICATIONS:  None    SPECIMENS:   * No specimens in log *     IMPLANTS:   AngioSeal closure device at Right CFA    DRAINS:   * No LDAs found *    FOLLOW-UP:  Admit to ICU  Normotension  Q 1 hr neuroccks  Head CT DATE OF SERVICE:  2/26/2023 11:49 AM     ATTENDING SURGEON(S):  Anig Serrano MD      ANESTHESIA:   GA    EBL: 200 cc    MEDICATIONS:   See nursing record    PUNCTURE SITE:  Right common femoral artery. Arteriotomy closed with AngioSeal. Flat with leg straight x 2 hours. Nupur Thompson M.D.    Dony Madera    , Department of Radiology  [facility]

## 2023-02-26 NOTE — ED NOTES
SOC on camera,exam in progress. Having to keep awakening pt during exam. Pt follows commands while awake.  R side remains flaccid

## 2023-02-26 NOTE — ED PROVIDER NOTES
Patient arrived as a transfer from [facility]. I received transfer call from Dr. Sharlene Rosas. Patient has a wake-up stroke with LVO. Dr. Danielle Martinez from neuro interventional radiology would like to perform a thrombectomy. Patient transferred by critical care. Critical care as stated patient stayed stable but did have some periods of apnea that resolved after a few seconds. Patient was given 1 push Brady to maintain blood pressures above 424 systolic. Patient stable for movement to the IR suite and on arrival to the ED patient was taken immediately to the IR lab for thrombectomy. IMPRESSION:  1. Cerebrovascular accident (CVA) due to occlusion of left middle cerebral artery ([facility] [address]      DISPOSITION: OR    Osmany Knight MD    Procedures

## 2023-02-26 NOTE — PROGRESS NOTES
1015: Patient arrived to [address] 1. Not able to load pt in procedure room due to current case still going on in room. [facility] on arrival 25.     1044: Time out. 1105: report given to [facility].

## 2023-02-26 NOTE — PROGRESS NOTES
Neurocritical Care Code Stroke Documentation    Patient arrived at 10:15 am for code stroke transfer from [facility] for left MCA occlusion. Patient taken straight down to angio for emergent thrombectomy. Symptoms:   Right-sided weakness, aphasia   Last Known Well: 1144 pm per review of notes   Baseline mRS 0   Medical hx: Hypertension, prediabetes, high cholesterol   Anticoagulation: none   VAN:   Positive   NIHSS:   1a-LOC:0    1b-Month/Age:2    1c-Open/Close Hand:2    2-Best Gaze:1    3-Visual Fields:2    4-Facial Palsy:1    5a-Left Arm:0    5b-Right Arm:3    6a-Left Le    6b-Right Le    7-Limb Ataxia:0    8-Sensory:1    9-Best Language:2    10-Dysarthria:2    11-Extinction/Inattention: 1  TOTAL SCORE:18   Imaging:   CT: CT evidence of left MCA territory infarct and probable MCA thrombus. No intracranial hemorrhage. CTA: IMPRESSION  1. Occlusion of distal M1 segment of left MCA. 2.  Left MCA territory infarction. 3.  Severe stenosis left cervical ICA. Mild stenosis right cervical ICA. Plan:   TNK Candidate: NO    Mechanical thrombectomy Candidate: YES   Patient is alert. States yes only, otherwise has severe expressive aphasia and some receptive aphasia. Right-sided weakness and some neglect. Written informed consent was obtained from the patient's son via phone. All risks, benefits, and alternatives were explained in detail to the patient's son. Discussed with Dr. Aaliyah Olson and RN. Arrival time: 36, patient arrived to ED at 1015 AM. I was present in ED upon arrival.     Time spent: 20 minutes.      Medardo oSto NP

## 2023-02-26 NOTE — H&P
[facility]    ICU TEAM Progress Note    Name: Benny Umana III   : 1950   MRN: [id number]   Date: 2023           ICU Assessment     stroke  S/p thrombectomy           ICU Comprehensive Plan of Care:     Neurological System  Q 1 h head CT  Repeat HCT now  ASA post op  Post op thrombectomy care    Cardiovascular System  SBP Goal of: < 140 mmHg  MAP Goal of: > 65 mmHg  None - For above SBP/MAP goals  Transfusion Trigger (Hgb): <7 g/dL  Keep K > 4; Mg > 2     Respiratory System  N/A  Spontaneous Breathing Trial: No  Pulmonary toilet:  None    SpO2 Goal: > 92%  DVT Prophylaxis: Lovenox     Renal/GI/Endocrine System  IVFs: Fluids at 75  Ulcer Prophylaxis: Not at this time   Bowel Regimen: MiraLax  Feeding:  Pending   Blood Sugar Goal 120-180 - Glycemic Control: Insulin    Infectious Disease  No issues    PT/OT:  Deferred      Goals of Care Discussion with family Pending     Plan of Care/Code Status: Full Code    Discussed Care Plan with Bedside RN  Yes    Documentation of Current Medications  Yes    Subjective:   Progress Note: 2023      Reason for ICU Admission: s/p thrombectomy     HPI:  \"Junito Isidro III, 81 y.o. male with a past medical history significant hypertension presents to the ED with chief complaint of Extremity Weakness  . 77-year-old male spoke to his wife at 11:45 PM last night and was well went to sleep upon her returning from work around 6:37 AM she noted him to be on the ground slumped over altered with new weakness. EMS was called. Presents as a level 2 stroke alert. Patient's not on anticoagulation. \"    POD:  * No surgery found *    S/P:  thrombectomy      Active Problem List:     Problem List  Never Reviewed            Codes Class    Stroke [facility]) ICD-10-CM: I63.9  ICD-9-CM: 434.91            Past Medical History:      has no past medical history on file. Past Surgical History:      has no past surgical history on file.     Home Medications:     Prior to Admission medications    Not on File       Allergies/Social/Family History:     No Known Allergies   Social History     Tobacco Use    Smoking status: Not on file    Smokeless tobacco: Not on file   Substance Use Topics    Alcohol use: Not on file      No family history on file. Review of Systems:     Pertinent items are noted in HPI. Objective:   Vital Signs: There were no vitals taken for this visit. Temp (24hrs), Av.3 °F (37.4 °C), Min:99.3 °F (37.4 °C), Max:99.3 °F (37.4 °C)           Intake/Output:   No intake or output data in the 24 hours ending 23 1222    Physical Exam:    Gen large male in NAD  Lungs clear  CV RRR  Abd obese, NT, ND    LABS AND  DATA: Personally reviewed  Recent Labs     23  0758   WBC 8.3   HGB 15.6   HCT 45.9        Recent Labs     23  0758      K Hemolyzed, recollect requested   *   CO2 21   BUN 25*   CREA 1.50*   *   CA 9.0     Recent Labs     23  0758   AP 79   TP 6.7   ALB 3.5   GLOB 3.2     Recent Labs     23  0758   INR 1.0   PTP 12.9   APTT 23.3      No results for input(s): PHI, PCO2I, PO2I, FIO2I in the last 72 hours. No results for input(s): CPK, CKMB, TROIQ, BNPP in the last 72 hours. Ventilator Settings:  Mode Rate Tidal Volume Pressure FiO2 PEEP    spont    300            Peak airway pressure:      Minute ventilation:          MEDS: Reviewed    Chest X-Ray:  CXR Results  (Last 48 hours)                 23 0908  XR CHEST PORT Final result    Impression:  Grossly clear lungs. Narrative:  INDICATION: Infarct. Portable AP view of the chest.       There is no prior study for direct comparison. Cardiomediastinal silhouette is prominent, likely secondary to patient rotation. Lungs are grossly clear bilaterally. Pleural spaces are normal and there is no   pneumothorax. Osseous structures are intact.                    CRITICAL CARE CONSULTANT NOTE  I had a face to face encounter with the patient, reviewed and interpreted patient data including clinical events, labs, images, vital signs, I/O's, and examined patient. I have discussed the case and the plan and management of the patient's care with the consulting services, the bedside nurses and the respiratory therapist.      NOTE OF PERSONAL INVOLVEMENT IN CARE   This patient has a high probability of imminent, clinically significant deterioration, which requires the highest level of preparedness to intervene urgently. I participated in the decision-making and personally managed or directed the management of the following life and organ supporting interventions that required my frequent assessment to treat or prevent imminent deterioration. I personally spent 45 minutes of critical care time. This is time spent at this critically ill patient's bedside actively involved in patient care as well as the coordination of care. This does not include any procedural time which has been billed separately.     Kerrie Trinh MD  [facility] Critical Care  2/26/2023

## 2023-02-26 NOTE — PROGRESS NOTES
1230 TRANSFER - IN REPORT:    Verbal report received from JULIO Juarez RN(name) on Junito Isidro III  being received from ANGIO(unit) for routine post - op      Report consisted of patients Situation, Background, Assessment and   Recommendations(SBAR). Information from the following report(s) SBAR, Kardex, ED Summary, Procedure Summary, Intake/Output, MAR, Accordion, Recent Results, Med Rec Status, Cardiac Rhythm SINUS RHYTHM, Alarm Parameters , and Dual Neuro Assessment was reviewed with the receiving nurse. Opportunity for questions and clarification was provided. Assessment completed upon patients arrival to unit and care assumed. Primary Nurse Ze Cao RN and Gabriela Caro RN performed a dual skin assessment on this patient No impairment noted. Some blanchable redness inner thighs. Sony score is 13    1546 SHARAD Mccarthy notified that patients SBP >140, despite being maxed on Cardene. SHARAD Mccarthy to place orders for hydralazine. 1600 MRI screening form completed. This RN calling down to MRI to see if there is a time for patient to come down. MRI has no availability until after 1700. SHARAD Mccarthy okay for patient to wait. 1619 20 mg hydralazine PRN given. Canelo Zavaleta, NP notified that patients SBP in the 160's, despite being maxed on Cardene. Patient had PRN hydralazine 20 mg at 1619.     1930 Bedside and Verbal shift change report given to MICHELLE DOMINGO (oncoming nurse) by Gladis BRAMBILA RN (offgoing nurse). Report included the following information SBAR, Kardex, ED Summary, Procedure Summary, Intake/Output, MAR, Accordion, Recent Results, Med Rec Status, Cardiac Rhythm SINUS RHYTHM, Alarm Parameters , and Dual Neuro Assessment.

## 2023-02-26 NOTE — CONSULTS
Neurointerventional Surgery Consult  [facility]  Neurocritical Care NP    Patient: Claudy Juárez MRN: [id number]  SSN: [id number]    YOB: 1950  Age: 81 y.o. Sex: male        Chief Complaint: right-sided weakness and aphasia     Subjective:      Claudy Juárez is a 81 y.o. male with PMH significant for HTN, pre-diabetes, and high cholesterol who initially presented to [address] [place] [address] today with right-sided weakness and aphasia. LKW was around the 11:00 hour last night. The wife reported that he was fine last night. This morning around 6:30 am she noticed that the TV was still on in the living room and found the patient on the floor with difficulty talking. 911 was called and the patient was brought in to the ED as a stroke alert. CT of Head showed a left MCA territory infarct and probable MCA thrombus. No intracranial hemorrhage. CTA of H/N showed occlusion of the distal left MCA segment and severe stenosis of left cervical ICA. Mild stenosis right cervical ICA. NIS consulted and the patient was deemed a candidate for mechanical thrombectomy. He was not a candidate for TNK due to LKW time. He is not taking any blood thinners at home. He was transferred to [facility] for neurointervention. He was emergently taken to the angio suite for thrombectomy. NIHSS 18 prior to procedure. He has no prior history of a stroke.     PMH: stated above   Past Surgical History:   Procedure Laterality Date    [facility]  [facility]/2023    No major surgeries    Family History: Father with hx of diabetes  No family history of strokes   Social History     Tobacco Use    Smoking status: Never    Smokeless tobacco: None   Substance Use Topics    Alcohol use: No alcohol use    Family denies any illicit drug use     Current Facility-Administered Medications   Medication Dose Route Frequency Provider Last Rate Last Admin    iopamidoL (ISOVUE 300) 300 mg iodine /mL (61 %) contrast injection sodium chloride (NS) flush 5-40 mL  5-40 mL IntraVENous Q8H Eleanor Aiken MD   10 mL at 02/26/23 1338    sodium chloride (NS) flush 5-40 mL  5-40 mL IntraVENous PRN Eleanor Aiken MD        acetaminophen (TYLENOL) tablet 650 mg  650 mg Oral Q6H PRN Eleanor Aiken MD        Or    acetaminophen (TYLENOL) suppository 650 mg  650 mg Rectal Q6H PRN Eleanor Aiken MD        polyethylene glycol (MIRALAX) packet 17 g  17 g Oral DAILY PRN Eleanor Aiken MD        ondansetron (ZOFRAN ODT) tablet 4 mg  4 mg Oral Q8H PRN Eleanor Aiken MD        Or    ondansetron (ZOFRAN) injection 4 mg  4 mg IntraVENous Q6H PRN Eleanor Aiken MD        sodium chloride (NS) flush 5-40 mL  5-40 mL IntraVENous Q8H Sachi Mccarthy NP   10 mL at 02/26/23 1338    sodium chloride (NS) flush 5-40 mL  5-40 mL IntraVENous PRN Sachi Mccarthy NP        [START ON 2/27/2023] enoxaparin (LOVENOX) injection 30 mg  30 mg SubCUTAneous Q12H Eleanor Aiken MD        niCARdipine (CARDENE) 25 mg in 0.9% sodium chloride 250 mL ([id number])  0-15 mg/hr IntraVENous TITRATE Sachi Mccarthy  mL/hr at 02/26/23 1425 12.5 mg/hr at 02/26/23 1425    0.9% sodium chloride infusion  75 mL/hr IntraVENous CONTINUOUS Sachi Mccarthy NP 75 mL/hr at 02/26/23 1352 75 mL/hr at 02/26/23 1352     Facility-Administered Medications Ordered in Other Encounters   Medication Dose Route Frequency Provider Last Rate Last Admin    lidocaine (XYLOCAINE) 10 mg/mL (1 %) injection 10 mL  10 mL SubCUTAneous RAD ONCE Tc Mackenzie MD        heparin 4000 units/1000 mL (4 units/mL) in NS infusion **FOR ANGIO USE ONLY**   IntraVENous RAD PRN Tc Mackenzie MD   4,000 Units at 02/26/23 1102    verapamiL (ISOPTIN) 2.5 mg/mL injection 10 mg  10 mg IntraarTERial RAD ONCE Tc Mackenzie MD            No Known Allergies    Review of Systems:  Review of systems not obtained due to patient factors. Patient aphasic.        Objective: Vitals:    23 1245 23 1300 23 1310 23 1330   BP: (!) 161/85 (!) 148/83  (!) 159/81   Pulse: 86 86 80    Resp: 17 16 14    Temp:       SpO2: 99% 96% 97%    Weight:       Height:            Physical Exam:  GENERAL: alert, confused  LUNG: clear to auscultation bilaterally  HEART: regular rate and rhythm, S1, S2 normal, no murmur, click, rub or gallop  EXTREMITIES:  extremities normal, atraumatic, no cyanosis, trace peripheral edema   SKIN: Skin warm to touch. Appropriate for ethnicity. Neurologic Exam:  Mental Status:  Alert. Stated name. Unable to state time, place, or situation. Patient repeats yes. Language:    Severe expressive aphasia and some receptive aphasia. Cranial Nerves:        Pupils equal, round and reactive to light. No blink to threat in right eye. Blinks to threat in left eye. Tracks to the left with eyes. Difficulty tracking to the right with eyes. Right partial gaze palsy. Mild right facial droop. Severe dysarthria. Motor:    Right-sided weakness present. No effort against gravity in RUE-withdraws to pain only in RUE. RLE drift present. Moves LUE and LLE spontaneously (no drift). Difficult to fully assess strength. Withdraws to pain in extremities. No involuntary movements. Sensation:    Withdraws to pain in all extremities. Appears to have right-sided neglect on exam and decreased sensation on the right side. Reflexes:    Deferred. Coordination & Gait: FTN and HTS intact on the left side. Unable to perform on the right side due to weakness. Gait deferred.        NIHSS:     1a-LOC:0    1b-Month/Age:2    1c-Open/Close Hand:2    2-Best Gaze:1    3-Visual Fields:2    4-Facial Palsy:1    5a-Left Arm:0    5b-Right Arm:3    6a-Left Le    6b-Right Le    7-Limb Ataxia:0    8-Sensory:1    9-Best Language:2    10-Dysarthria:2    11-Extinction/Inattention: 1  TOTAL SCORE:18    Recent Results (from the past 24 hour(s))   CBC WITH AUTOMATED DIFF    Collection Time: 02/26/23  7:58 AM   Result Value Ref Range    WBC 8.3 4.1 - 11.1 K/uL    RBC 4.87 4.10 - 5.70 M/uL    HGB 15.6 12.1 - 17.0 g/dL    HCT 45.9 36.6 - 50.3 %    MCV 94.3 80.0 - 99.0 FL    MCH 32.0 26.0 - 34.0 PG    MCHC 34.0 30.0 - 36.5 g/dL    RDW 12.8 11.5 - 14.5 %    PLATELET 927 152 - 850 K/uL    MPV 9.1 8.9 - 12.9 FL    NRBC 0.0 0.0  WBC    ABSOLUTE NRBC 0.00 0.00 - 0.01 K/uL    NEUTROPHILS 84 (H) 32 - 75 %    LYMPHOCYTES 9 (L) 12 - 49 %    MONOCYTES 5 5 - 13 %    EOSINOPHILS 1 0 - 7 %    BASOPHILS 0 0 - 1 %    IMMATURE GRANULOCYTES 1 (H) 0 - 0.5 %    ABS. NEUTROPHILS 7.0 1.8 - 8.0 K/UL    ABS. LYMPHOCYTES 0.7 (L) 0.8 - 3.5 K/UL    ABS. MONOCYTES 0.4 0.0 - 1.0 K/UL    ABS. EOSINOPHILS 0.1 0.0 - 0.4 K/UL    ABS. BASOPHILS 0.0 0.0 - 0.1 K/UL    ABS. IMM. GRANS. 0.1 (H) 0.00 - 0.04 K/UL    DF AUTOMATED      RBC COMMENTS Normocytic, Normochromic     METABOLIC PANEL, COMPREHENSIVE    Collection Time: 02/26/23  7:58 AM   Result Value Ref Range    Sodium 136 136 - 145 mmol/L    Potassium Hemolyzed, recollect requested 3.5 - 5.1 mmol/L    Chloride 110 (H) 97 - 108 mmol/L    CO2 21 21 - 32 mmol/L    Anion gap 5 5 - 15 mmol/L    Glucose 134 (H) 65 - 100 mg/dL    BUN 25 (H) 6 - 20 mg/dL    Creatinine 1.50 (H) 0.70 - 1.30 mg/dL    BUN/Creatinine ratio 17 12 - 20      eGFR 50 (L) >60 ml/min/1.73m2    Calcium 9.0 8.5 - 10.1 mg/dL    Bilirubin, total 0.8 0.2 - 1.0 mg/dL    AST (SGOT) Hemolyzed, recollect requested 15 - 37 U/L    ALT (SGPT) 26 12 - 78 U/L    Alk.  phosphatase 79 45 - 117 U/L    Protein, total 6.7 6.4 - 8.2 g/dL    Albumin 3.5 3.5 - 5.0 g/dL    Globulin 3.2 2.0 - 4.0 g/dL    A-G Ratio 1.1 1.1 - 2.2     PROTHROMBIN TIME + INR    Collection Time: 02/26/23  7:58 AM   Result Value Ref Range    Prothrombin time 12.9 11.9 - 14.6 sec    INR 1.0 0.9 - 1.1     PTT    Collection Time: 02/26/23  7:58 AM   Result Value Ref Range    aPTT 23.3 21.2 - 34.1 sec    aPTT, therapeutic range   82 - 109 sec   TROPONIN-HIGH SENSITIVITY    Collection Time: 02/26/23  7:58 AM   Result Value Ref Range    Troponin-High Sensitivity 5 0 - 76 ng/L       Imaging:  CTH: CT evidence of left MCA territory infarct and probable MCA thrombus. No intracranial hemorrhage. CTA: Occlusion of distal M1 segment of left MCA. Left MCA territory infarction. Severe stenosis left cervical ICA. Mild stenosis right cervical ICA. Assessment:   Acute Left MCA Ischemic CVA due to left MCA occlusion  Left ICA stenosis      Plan:   - patient to go emergently to angio suite for mechanical thrombectomy. Written informed consent was obtained from the patient's son  - admit to ICU post procedure for further monitoring and stroke work up  - check lipid panel, Hgb A1C, TSH  - obtain ECHO to assess for any heart abnormality  - MRI of Brain ordered to assess extent of ischemia  - PT/OT/SLP evals  - Neurology consult    Plan discussed with Dr. Arabella Tobias, RN, and patient's family. Thank you for this consult and participating in the care of this patient.         Signed By: Jitendra Batista NP     February 26, 2023

## 2023-02-26 NOTE — PROGRESS NOTES
NIS Brief Post Procedure Progress Note      Patient is s/p left MCA thrombectomy for stroke treatment. Family present in room. Patient is slightly sleepy, but arouses to voice. Unable to answer orientation questions. EOMs intact. PERRL. No blink to threat on the right. Blinks to threat on the left. Intermittently will follow some commands. Right facial droop present. Severe aphasia and dysarthria present. Left-sided visual neglect. LUE and LLE moves spontaneously with 5/5 strength. RUE with drift and able to slightly  with right hand. RLE with drift, strength 4/5. No significant ataxia with FTN in left hand. Difficult to assess HTS, patient did not accurately perform task. Unable to assess FTN in right hand due to weakness. No involuntary movements. Sensation appears intact. Withdraws to pain in extremities. Gait deferred. Right groin site clean, dry, and intact. No hematoma, bleeding, or bruising. Distal pulses present. Skin warm to touch. NIHSS:      1a-LOC:1    1b-Month/Age:2    1c-Open/Close Hand:2    2-Best Gaze:0    3-Visual Fields:2    4-Facial Palsy:1    5a-Left Arm:0    5b-Right Arm:1    6a-Left Le    6b-Right Le    7-Limb Ataxia:0    8-Sensory:0    9-Best Language:2    10-Dysarthria:2    11-Extinction/Inattention:1  TOTAL SCORE:15    PLAN:  Head CT post procedure showed decreased evidence of left MCA territory infarct. No hemorrhage. Start 300 mg of aspirin daily tomorrow (patient received aspirin in angio today)  SBP goal 100-140 post procedure, Cardene drip PRN, ordered Hydralazine PRN  Follow neurovascular checks per thrombectomy protocol  Ordered bilateral carotid duplex to assess carotid stenosis noted on CTA    Plan discussed with Dr. Aaliyah Olson RN and patient's family.

## 2023-02-27 ENCOUNTER — APPOINTMENT (OUTPATIENT)
Dept: MRI IMAGING | Age: 73
DRG: 023 | End: 2023-02-27
Attending: NURSE PRACTITIONER
Payer: MEDICARE

## 2023-02-27 LAB
ALBUMIN SERPL-MCNC: 2.9 G/DL (ref 3.5–5)
ALBUMIN/GLOB SERPL: 1 (ref 1.1–2.2)
ALP SERPL-CCNC: 69 U/L (ref 45–117)
ALT SERPL-CCNC: 20 U/L (ref 12–78)
ANION GAP SERPL CALC-SCNC: 7 MMOL/L (ref 5–15)
AST SERPL-CCNC: 19 U/L (ref 15–37)
BASOPHILS # BLD: 0 K/UL (ref 0–0.1)
BASOPHILS NFR BLD: 0 % (ref 0–1)
BILIRUB SERPL-MCNC: 0.8 MG/DL (ref 0.2–1)
BUN SERPL-MCNC: 16 MG/DL (ref 6–20)
BUN/CREAT SERPL: 12 (ref 12–20)
CALCIUM SERPL-MCNC: 7.9 MG/DL (ref 8.5–10.1)
CHLORIDE SERPL-SCNC: 114 MMOL/L (ref 97–108)
CO2 SERPL-SCNC: 20 MMOL/L (ref 21–32)
CREAT SERPL-MCNC: 1.38 MG/DL (ref 0.7–1.3)
DIFFERENTIAL METHOD BLD: ABNORMAL
EOSINOPHIL # BLD: 0 K/UL (ref 0–0.4)
EOSINOPHIL NFR BLD: 0 % (ref 0–7)
ERYTHROCYTE [DISTWIDTH] IN BLOOD BY AUTOMATED COUNT: 13.4 % (ref 11.5–14.5)
GLOBULIN SER CALC-MCNC: 2.9 G/DL (ref 2–4)
GLUCOSE SERPL-MCNC: 144 MG/DL (ref 65–100)
HCT VFR BLD AUTO: 42.7 % (ref 36.6–50.3)
HGB BLD-MCNC: 14 G/DL (ref 12.1–17)
IMM GRANULOCYTES # BLD AUTO: 0 K/UL (ref 0–0.04)
IMM GRANULOCYTES NFR BLD AUTO: 0 % (ref 0–0.5)
LEFT CCA DIST DIAS: 15.7 CM/S
LEFT CCA DIST SYS: 128.9 CM/S
LEFT CCA PROX DIAS: 21.2 CM/S
LEFT CCA PROX SYS: 121.7 CM/S
LEFT ECA DIAS: 12.3 CM/S
LEFT ECA SYS: 170.3 CM/S
LEFT ICA DIST DIAS: 7.9 CM/S
LEFT ICA DIST SYS: 59.3 CM/S
LEFT ICA MID DIAS: 22.4 CM/S
LEFT ICA MID SYS: 172.7 CM/S
LEFT ICA PROX DIAS: 61.2 CM/S
LEFT ICA PROX SYS: 324 CM/S
LEFT ICA/CCA SYS: 2.5 NO UNITS
LEFT VERTEBRAL DIAS: 8.5 CM/S
LEFT VERTEBRAL SYS: 121.7 CM/S
LYMPHOCYTES # BLD: 0.7 K/UL (ref 0.8–3.5)
LYMPHOCYTES NFR BLD: 9 % (ref 12–49)
MAGNESIUM SERPL-MCNC: 2.1 MG/DL (ref 1.6–2.4)
MCH RBC QN AUTO: 31.4 PG (ref 26–34)
MCHC RBC AUTO-ENTMCNC: 32.8 G/DL (ref 30–36.5)
MCV RBC AUTO: 95.7 FL (ref 80–99)
MONOCYTES # BLD: 0.4 K/UL (ref 0–1)
MONOCYTES NFR BLD: 5 % (ref 5–13)
NEUTS SEG # BLD: 6.6 K/UL (ref 1.8–8)
NEUTS SEG NFR BLD: 86 % (ref 32–75)
NRBC # BLD: 0 K/UL (ref 0–0.01)
NRBC BLD-RTO: 0 PER 100 WBC
PLATELET # BLD AUTO: 192 K/UL (ref 150–400)
PMV BLD AUTO: 9.2 FL (ref 8.9–12.9)
POTASSIUM SERPL-SCNC: 4.1 MMOL/L (ref 3.5–5.1)
PROT SERPL-MCNC: 5.8 G/DL (ref 6.4–8.2)
RBC # BLD AUTO: 4.46 M/UL (ref 4.1–5.7)
RBC MORPH BLD: ABNORMAL
RIGHT CCA DIST DIAS: 10.8 CM/S
RIGHT CCA DIST SYS: 118.3 CM/S
RIGHT CCA PROX DIAS: 14.6 CM/S
RIGHT CCA PROX SYS: 103.1 CM/S
RIGHT ECA DIAS: 10.8 CM/S
RIGHT ECA SYS: 164.4 CM/S
RIGHT ICA DIST DIAS: 8.7 CM/S
RIGHT ICA DIST SYS: 77.9 CM/S
RIGHT ICA MID DIAS: 12.6 CM/S
RIGHT ICA MID SYS: 91.2 CM/S
RIGHT ICA PROX DIAS: 7.2 CM/S
RIGHT ICA PROX SYS: 50.2 CM/S
RIGHT ICA/CCA SYS: 0.8 NO UNITS
RIGHT VERTEBRAL DIAS: 10.3 CM/S
RIGHT VERTEBRAL SYS: 47.1 CM/S
SODIUM SERPL-SCNC: 141 MMOL/L (ref 136–145)
WBC # BLD AUTO: 7.7 K/UL (ref 4.1–11.1)

## 2023-02-27 PROCEDURE — 74011250636 HC RX REV CODE- 250/636: Performed by: ANESTHESIOLOGY

## 2023-02-27 PROCEDURE — 94760 N-INVAS EAR/PLS OXIMETRY 1: CPT

## 2023-02-27 PROCEDURE — 97165 OT EVAL LOW COMPLEX 30 MIN: CPT

## 2023-02-27 PROCEDURE — 85025 COMPLETE CBC W/AUTO DIFF WBC: CPT

## 2023-02-27 PROCEDURE — 94660 CPAP INITIATION&MGMT: CPT

## 2023-02-27 PROCEDURE — 83735 ASSAY OF MAGNESIUM: CPT

## 2023-02-27 PROCEDURE — 97530 THERAPEUTIC ACTIVITIES: CPT

## 2023-02-27 PROCEDURE — 74011000250 HC RX REV CODE- 250: Performed by: INTERNAL MEDICINE

## 2023-02-27 PROCEDURE — 80053 COMPREHEN METABOLIC PANEL: CPT

## 2023-02-27 PROCEDURE — 70551 MRI BRAIN STEM W/O DYE: CPT

## 2023-02-27 PROCEDURE — 74011000250 HC RX REV CODE- 250: Performed by: ANESTHESIOLOGY

## 2023-02-27 PROCEDURE — 74011250637 HC RX REV CODE- 250/637: Performed by: NURSE PRACTITIONER

## 2023-02-27 PROCEDURE — 74011250637 HC RX REV CODE- 250/637: Performed by: ANESTHESIOLOGY

## 2023-02-27 PROCEDURE — 92610 EVALUATE SWALLOWING FUNCTION: CPT

## 2023-02-27 PROCEDURE — 74011000250 HC RX REV CODE- 250

## 2023-02-27 PROCEDURE — 51798 US URINE CAPACITY MEASURE: CPT

## 2023-02-27 PROCEDURE — 74011250636 HC RX REV CODE- 250/636: Performed by: NURSE PRACTITIONER

## 2023-02-27 PROCEDURE — 36415 COLL VENOUS BLD VENIPUNCTURE: CPT

## 2023-02-27 PROCEDURE — 65610000006 HC RM INTENSIVE CARE

## 2023-02-27 PROCEDURE — 97163 PT EVAL HIGH COMPLEX 45 MIN: CPT

## 2023-02-27 PROCEDURE — 74011250636 HC RX REV CODE- 250/636: Performed by: INTERNAL MEDICINE

## 2023-02-27 PROCEDURE — 92523 SPEECH SOUND LANG COMPREHEN: CPT

## 2023-02-27 PROCEDURE — 74011000250 HC RX REV CODE- 250: Performed by: NURSE PRACTITIONER

## 2023-02-27 PROCEDURE — 99223 1ST HOSP IP/OBS HIGH 75: CPT | Performed by: PSYCHIATRY & NEUROLOGY

## 2023-02-27 RX ORDER — ATORVASTATIN CALCIUM 40 MG/1
40 TABLET, FILM COATED ORAL
Status: DISCONTINUED | OUTPATIENT
Start: 2023-02-28 | End: 2023-03-07 | Stop reason: HOSPADM

## 2023-02-27 RX ORDER — ENALAPRILAT 1.25 MG/ML
1.25 INJECTION INTRAVENOUS EVERY 6 HOURS
Status: DISCONTINUED | OUTPATIENT
Start: 2023-02-27 | End: 2023-03-01

## 2023-02-27 RX ORDER — METOPROLOL TARTRATE 5 MG/5ML
2.5 INJECTION INTRAVENOUS EVERY 4 HOURS
Status: DISCONTINUED | OUTPATIENT
Start: 2023-02-27 | End: 2023-02-28

## 2023-02-27 RX ORDER — SODIUM CHLORIDE, SODIUM LACTATE, POTASSIUM CHLORIDE, CALCIUM CHLORIDE 600; 310; 30; 20 MG/100ML; MG/100ML; MG/100ML; MG/100ML
100 INJECTION, SOLUTION INTRAVENOUS CONTINUOUS
Status: DISCONTINUED | OUTPATIENT
Start: 2023-02-27 | End: 2023-02-28

## 2023-02-27 RX ORDER — LABETALOL HYDROCHLORIDE 5 MG/ML
20 INJECTION, SOLUTION INTRAVENOUS
Status: COMPLETED | OUTPATIENT
Start: 2023-02-27 | End: 2023-02-27

## 2023-02-27 RX ADMIN — LABETALOL HYDROCHLORIDE 20 MG: 5 INJECTION INTRAVENOUS at 02:14

## 2023-02-27 RX ADMIN — LABETALOL HYDROCHLORIDE 20 MG: 5 INJECTION INTRAVENOUS at 21:34

## 2023-02-27 RX ADMIN — SODIUM CHLORIDE, POTASSIUM CHLORIDE, SODIUM LACTATE AND CALCIUM CHLORIDE 100 ML/HR: 600; 310; 30; 20 INJECTION, SOLUTION INTRAVENOUS at 18:49

## 2023-02-27 RX ADMIN — METOPROLOL TARTRATE 2.5 MG: 5 INJECTION, SOLUTION INTRAVENOUS at 20:05

## 2023-02-27 RX ADMIN — NICARDIPINE HYDROCHLORIDE 10 MG/HR: 25 INJECTION, SOLUTION INTRAVENOUS at 09:03

## 2023-02-27 RX ADMIN — SODIUM CHLORIDE, PRESERVATIVE FREE 10 ML: 5 INJECTION INTRAVENOUS at 15:17

## 2023-02-27 RX ADMIN — ACETAMINOPHEN 650 MG: 650 SUPPOSITORY RECTAL at 04:38

## 2023-02-27 RX ADMIN — ENALAPRILAT 1.25 MG: 1.25 INJECTION INTRAVENOUS at 18:46

## 2023-02-27 RX ADMIN — ENOXAPARIN SODIUM 30 MG: 100 INJECTION SUBCUTANEOUS at 08:47

## 2023-02-27 RX ADMIN — SODIUM CHLORIDE 75 ML/HR: 9 INJECTION, SOLUTION INTRAVENOUS at 16:59

## 2023-02-27 RX ADMIN — NICARDIPINE HYDROCHLORIDE 12.5 MG/HR: 25 INJECTION, SOLUTION INTRAVENOUS at 04:37

## 2023-02-27 RX ADMIN — HYDRALAZINE HYDROCHLORIDE 20 MG: 20 INJECTION INTRAMUSCULAR; INTRAVENOUS at 16:56

## 2023-02-27 RX ADMIN — ASPIRIN 300 MG: 300 SUPPOSITORY RECTAL at 08:47

## 2023-02-27 RX ADMIN — ENOXAPARIN SODIUM 30 MG: 100 INJECTION SUBCUTANEOUS at 20:06

## 2023-02-27 RX ADMIN — NICARDIPINE HYDROCHLORIDE 12.5 MG/HR: 25 INJECTION, SOLUTION INTRAVENOUS at 06:56

## 2023-02-27 RX ADMIN — NICARDIPINE HYDROCHLORIDE 15 MG/HR: 25 INJECTION, SOLUTION INTRAVENOUS at 02:21

## 2023-02-27 RX ADMIN — LABETALOL HYDROCHLORIDE 20 MG: 5 INJECTION INTRAVENOUS at 17:35

## 2023-02-27 RX ADMIN — LABETALOL HYDROCHLORIDE 20 MG: 5 INJECTION INTRAVENOUS at 00:17

## 2023-02-27 RX ADMIN — NICARDIPINE HYDROCHLORIDE 15 MG/HR: 25 INJECTION, SOLUTION INTRAVENOUS at 00:42

## 2023-02-27 RX ADMIN — LABETALOL HYDROCHLORIDE 20 MG: 5 INJECTION INTRAVENOUS at 12:37

## 2023-02-27 RX ADMIN — NICARDIPINE HYDROCHLORIDE 12.5 MG/HR: 25 INJECTION, SOLUTION INTRAVENOUS at 17:53

## 2023-02-27 RX ADMIN — NICARDIPINE HYDROCHLORIDE 12.5 MG/HR: 25 INJECTION, SOLUTION INTRAVENOUS at 22:47

## 2023-02-27 RX ADMIN — METOPROLOL TARTRATE 2.5 MG: 5 INJECTION, SOLUTION INTRAVENOUS at 23:15

## 2023-02-27 RX ADMIN — SODIUM CHLORIDE, PRESERVATIVE FREE 10 ML: 5 INJECTION INTRAVENOUS at 21:35

## 2023-02-27 RX ADMIN — NICARDIPINE HYDROCHLORIDE 7.5 MG/HR: 25 INJECTION, SOLUTION INTRAVENOUS at 15:10

## 2023-02-27 RX ADMIN — NICARDIPINE HYDROCHLORIDE 7.5 MG/HR: 25 INJECTION, SOLUTION INTRAVENOUS at 11:08

## 2023-02-27 RX ADMIN — SODIUM CHLORIDE 75 ML/HR: 9 INJECTION, SOLUTION INTRAVENOUS at 04:42

## 2023-02-27 RX ADMIN — NICARDIPINE HYDROCHLORIDE 12.5 MG/HR: 25 INJECTION, SOLUTION INTRAVENOUS at 20:23

## 2023-02-27 NOTE — CONSULTS
Neurology Consult  DWAYNE Ley    Patient: Elo Forde III MRN: [id number]  SSN: [id number]    YOB: 1950  Age: 81 y.o. Sex: male      Chief Complaint: Right-sided weakness and aphasia    Subjective:      Kalpana Saldivar is a 81 y.o. M with a pmh significant for multiple concussions due to falls related to horse racing, HTN, DM, Hyperlipidemia and sleep apnea who initially presented to [facility] today with right-sided weakness and aphasia. Last known well time was around the 11:00 hour of 02/25/23. The wife reported he was fine before going to bed and then around 6:30 am in the morning of 02/26/23 she found him on the floor in the living room and he had difficulty talking. 911 was called and patient was brought to [facility] ED as a stroke alert. CT of Head showed a left MCA territory infarct and probable MCA thrombus. No intracranial hemorrhage. CTA of H/N showed occlusion of the distal left MCA segment and severe stenosis of left cervical ICA. Mild stenosis right cervical ICA. NIS consulted and the patient was deemed a candidate for mechanical thrombectomy. He was not a candidate for TNK due to last known well time. He was transferred to [facility] for neurointervention and higher level of care. Upon arrival he was emergently taken to the angio suite for thrombectomy. NIHSS 18 prior to procedure. He has no prior history of a stroke. He has seen some improvement with a more recent NIHSS of 16, he will hold his right arm up against gravity. Past medical history stated above. Family History  Father: History of diabetes. No family history of strokes. Social History     Tobacco Use    Smoking status:  Never    Smokeless tobacco:  None   Substance Use Topics    Alcohol use:  No alcohol use      Family denies any illicit drug use       No Known Allergies    Review of Systems:  Review of systems not obtained due to patient factors.     Objective:     Vitals:    02/26/23 2345 02/27/23 0000 02/27/23 0030 02/27/23 0100   BP: 139/69 (!) 165/68 (!) 129/58 132/89   Pulse:  86  83   Resp:  13  22   Temp:  100.3 °F (37.9 °C)     SpO2:  93%  93%   Weight:       Height:          Physical Exam:  GENERAL: sleepy, confused  LUNG: clear to auscultation bilaterally  HEART: regular rate and rhythm, S1, S2 normal, no murmur, click, rub or gallop  EXTREMITIES:  extremities normal, atraumatic, no cyanosis, trace peripheral edema   SKIN: Skin warm to touch. Appropriate for ethnicity. Neurologic Exam:    Mental Status:   Alert. Would only state \"right\" to any question asked. Unable to state name, time, place, or situation. Language:  Severe expressive aphasia and possibly some receptive aphasia. Cranial Nerves:   Pupils equal, round and reactive to light. Blinks to threat bilaterally. Tracks to the left with eyes. Difficulty tracking to the right with eyes. Right partial gaze palsy. Mild right facial droop. Severe dysarthria. Motor:  Right-sided weakness present. Effort against gravity with the RUE, but after assistance to lift arm up. RLE drift present. Moves LUE and LLE spontaneously (no drift). Difficult to fully assess strength. Withdraws to pain in all extremities. No involuntary movements. Sensation: Withdraws to pain in all extremities. Appears to have right-sided neglect on exam and decreased sensation on the right side. Reflexes:  Deferred. Coordination & Gait:   FTN and HTS intact on the left side. Unable to perform on the right side due to weakness. Gait deferred.      Labs:  Lab Results   Component Value Date/Time    WBC 8.3 02/26/2023 07:58 AM    HGB 15.6 02/26/2023 07:58 AM    HCT 45.9 02/26/2023 07:58 AM    PLATELET 829 63/92/2761 07:58 AM    MCV 94.3 02/26/2023 07:58 AM      Lab Results   Component Value Date/Time    Sodium 136 02/26/2023 07:58 AM    Potassium Hemolyzed, recollect requested 02/26/2023 07:58 AM    Chloride 110 (H) 02/26/2023 07:58 AM    CO2 21 02/26/2023 07:58 AM    Anion gap 5 02/26/2023 07:58 AM    Glucose 134 (H) 02/26/2023 07:58 AM    BUN 25 (H) 02/26/2023 07:58 AM    Creatinine 1.50 (H) 02/26/2023 07:58 AM    BUN/Creatinine ratio 17 02/26/2023 07:58 AM    Calcium 9.0 02/26/2023 07:58 AM     Hemoglobin A1c is 5.5, LDL is 107.2      Imaging:  CT Results (maximum last 3): Results from [place] encounter on 02/26/23    CT HEAD WO CONT    Narrative  EXAM: CT HEAD WO CONT    INDICATION: Left MCA thrombus treated with thrombectomy. Left MCA CVA. COMPARISON: CT head and CT angiography head earlier today    TECHNIQUE: Noncontrast head CT. Coronal and sagittal reformats. CT dose  reduction was achieved through the use of a standardized protocol tailored for  this examination and automatic exposure control for dose modulation. FINDINGS: The ventricles and sulci are age-appropriate without hydrocephalus. There is no mass effect or midline shift. There is no intracranial hemorrhage or  extra-axial fluid collection. Hyperattenuation in the left caudate nucleus and  left putamen most likely represents contrast staining. There is contrast in the  vasculature. Decreased hypoattenuation in the left insula and left temporal  lobe. The calvarium is intact. The visualized paranasal sinuses and mastoid air cells  are clear. Impression  Decreased evidence of left MCA territory infarct. No intracranial hemorrhage. Post thrombectomy changes are expected. Results from Hospital Encounter encounter on 02/26/23    CTA CODE NEURO HEAD AND NECK W CONT    Narrative  PRELIMINARY REPORT    Thrombus in the distal left middle cerebral artery. Acute versus subacute  infarct in the left MCA territory involves left basal ganglia, left insula, left  frontal lobe, and left temporal lobe. Severe stenosis of the proximal left  internal carotid artery.     Preliminary report was provided by Dr. Darnell Krishna, the on-call radiologist, at 0912  hours    Final report to follow. END PRELIMINARY REPORT    EXAM: CTA CODE NEURO HEAD AND NECK W CONT    INDICATION: Code Stroke    COMPARISON: None. CONTRAST: 100 mL of Isovue-370. TECHNIQUE:  Unenhanced  images were obtained to localize the volume for  acquisition. Multislice helical axial CT angiography was performed from the  aortic arch to the top of the head during uneventful rapid bolus intravenous  contrast administration. Coronal and sagittal reformations and 3D post  processing was performed. CT dose reduction was achieved through use of a  standardized protocol tailored for this examination and automatic exposure  control for dose modulation. This study was analyzed by the [address] ai algorithm. FINDINGS:    DELAYED ENHANCEMENT HEAD CT:  No abnormal parenchymal enhancement. No mass effect or midline shift. Left MCA  infarction. CTA NECK:  Great vessels: Normal arch anatomy with the origins patent. Right subclavian artery: Patent  Left subclavian artery: Patent  Right common carotid artery: Patent  Left common carotid artery: Patent  Cervical right internal carotid artery: Patent with less than 50% stenosis by  NASCET criteria. Cervical left internal carotid artery: Patent with greater than 90% stenosis by  NASCET criteria. Right vertebral artery: Patent  Left vertebral artery: Patent  The lung apices are clear. The thyroid is homogeneous. No cervical  lymphadenopathy. CTA HEAD:  Right cavernous internal carotid artery: Patent  Left cavernous internal carotid artery: Patent  Anterior cerebral arteries: Patent  Anterior communicating artery: Patent  Right middle cerebral artery: Patent  Left middle cerebral artery: Occlusion of the distal left MCA at distal M1  segment. Small amount of thrombus in the M2 segment within sylvian fissure.   Posterior communicating arteries: Patent  Posterior cerebral arteries: Patent  Basilar artery: Patent  Distal vertebral arteries: Patent  No evidence for intracranial aneurysm or hemodynamically significant stenosis. Impression  1. Occlusion of distal M1 segment of left MCA. 2.  Left MCA territory infarction. 3.  Severe stenosis left cervical ICA. Mild stenosis right cervical ICA. CT CODE NEURO HEAD WO CONTRAST    Narrative  EXAM:  CT CODE NEURO HEAD WO CONTRAST    INDICATION: Right extremity weakness and difficulty speaking. Last known well at  11:45 PM last night. COMPARISON: None    TECHNIQUE: Noncontrast head CT. Coronal and sagittal reformats. CT dose  reduction was achieved through the use of a standardized protocol tailored for  this examination and automatic exposure control for dose modulation. FINDINGS: The ventricles and sulci are age-appropriate without hydrocephalus. There is no mass effect or midline shift. There is no intracranial hemorrhage or  extra-axial fluid collection. Hypoattenuation in the left temporal lobe and left  insula includes loss of gray-white differentiation. Dense left MCA sign. The calvarium is intact. The visualized paranasal sinuses and mastoid air cells  are clear. Impression  CT evidence of left MCA territory infarct and probable MCA thrombus. No  intracranial hemorrhage. I discussed this impression with SHARAD Beckett at 0840 hours on 2/26/2023. Message  posted on [employer] smiley.     Assessment:     Hospital Problems  Never Reviewed            Codes Class Noted POA    Stroke [facility]) ICD-10-CM: I63.9  ICD-9-CM: 434.91  2/26/2023 Unknown         Plan:     1.) Acute ischemic CVA              - Neuro checks per NIS post procedure              - Start Lipitor 40 mg PO at bedtime, LDL goal <70 - currently 107.2              - Bilateral carotid duplex ordered              - Intiate CPAP, uses this at home              - MRI brain ordered              - ECHO ordered              - PT/OT/SLP evals              - Stroke education              - SBP goal 100-140 post procedure, cardene drip prn, hydralazine prn and labetalol prn. Labetalol increased from 10 mg to 20 mg prn overnight with an additional one time dose given to achieve goal.     I have discussed the diagnosis and the intended plan as seen in the above orders with Dr. Torin Glez, the patient and the primary RN. Patient/family updated on current plan of care and is in agreement. All questions were answered. Thank you for this consult and participating in the care of this patient. Signed By: DWAYNE Nicholson     February 27, 2023       Attending Attestation  I reviewed and agree with the history, exam findings, impression, and plan of care recorded in the advanced practitioners note and addended the note. The patient's case and plan of care was formulated under my guidance and I made edits as needed in the above note. We will finish work up as mentioned in the above note. Additional comments are noted in my below documentation. It is a great pleasure to see Susan Hurd, a 81 y.o. male today in the hospital. Neuro images were reviewed. Neurological presentation consistent with  stroke and   left M1 occlusion and ICA stenosis, mechanical thrombectomy. Antiplatelet/Anticoagulation per neurosurgery. There are multiple acute infarcts within the left MCA territory. Please continue the plan and management as mentioned in advanced practitioners note. I personally evaluated the patient and participated in key elements of management. I discussed problems, diagnosis and management with the advanced practitioner. As always, I greatly appreciate the opportunity to participate in the care of the patient.        Author: Manfred Canales MD  as of: 2/27/2023  at: 9:24 AM

## 2023-02-27 NOTE — PROGRESS NOTES
Problem: Mobility Impaired (Adult and Pediatric)  Goal: *Acute Goals and Plan of Care (Insert Text)  Description: FUNCTIONAL STATUS PRIOR TO ADMISSION: Patient was independent and active without use of DME. 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 Wears CPAP at night. History of prior concussions from horse racing. Retired. HOME SUPPORT PRIOR TO ADMISSION: The patient lived with spouse but did not require assist.    Physical Therapy Goals  Initiated 2/27/2023  1. Patient will move from supine to sit and sit to supine, scoot up and down, and roll side to side in bed with moderate assistance  within 7 day(s). 2.  Patient will transfer from bed to chair and chair to bed with maximal assistance using the least restrictive device within 7 day(s). 3.  Patient will perform sit to stand with moderate assistance within 7 day(s). 4.  Patient will ambulate with maximal assistance for 5 feet with the least restrictive device within 7 day(s). 5.  Patient will improve Santoro Balance score by 7 points within 7 days. Outcome: Progressing Towards Goal     PHYSICAL THERAPY EVALUATION- NEURO POPULATION  Patient: Karl Jaimes III (71 y.o. male)  Date: 2/27/2023  Primary Diagnosis: Stroke [facility]) [I63.9]       Precautions:  Fall (-140)      ASSESSMENT  Based on the objective data described below, the patient presents with impaired alertness, impaired cognitive status, severe expressive aphasia and moderate receptive aphasia, anticipated balance impaired, and R sided weakness. Arrived to patient asleep on CPAP and patient slow to wake even to sternal rub and PROM. Removed CPAP and utilized warm washcloth and patient able to intermittently and very briefly open eyes and follow a few simple commands in bed. Moves all extremities spontaneously/purposefully but R bea-body is certainly weaker than L bea-body and unable to move in full AROM on R UE/LE.  Patient not alert enough to participate in formal MMT or further functional mobility. High PLOF - independent and active and acting as caregiver for wife. Well below baseline. Hopeful that as patient is more alert, he will be able to progress mobility steadily. Recommend IPR. Acute PT will continue to follow and progress as able. Current Level of Function Impacting Discharge (mobility/balance): total assist    Functional Outcome Measure: The patient scored Total: 0/56 on the Santoro Balance Assessment which is indicative of high fall risk. Other factors to consider for discharge: PLOF, decreased alertness at evaluation     Patient will benefit from skilled therapy intervention to address the above noted impairments. PLAN :  Recommendations and Planned Interventions: bed mobility training, transfer training, gait training, therapeutic exercises, neuromuscular re-education, patient and family training/education, and therapeutic activities      Frequency/Duration: Patient will be followed by physical therapy:  5 times a week to address goals. Recommendation for discharge: (in order for the patient to meet his/her long term goals)  Therapy 3 hours per day 5-7 days per week    This discharge recommendation:  Has not yet been discussed the attending provider and/or case management    IF patient discharges home will need the following DME: to be determined (TBD)     SUBJECTIVE:   Patient stated Yeah.    OBJECTIVE DATA SUMMARY:   HISTORY:    No past medical history on file.   Past Surgical History:   Procedure Laterality Date    Critical access hospital THROMB INFUSION INTRACRANIAL  2/26/2023     Personal factors and/or comorbidities impacting plan of care: history of concussions from horse racing    Home Situation  Home Environment: Private residence  # Steps to Enter: 4  Rails to Enter: Yes  Hand Rails : Bilateral  One/Two Story Residence: One story  Living Alone: No  Support Systems: Spouse/Significant Other  Current DME Used/Available at Home: CPAP  Tub or Shower Type: Tub/Shower combination    EXAMINATION/PRESENTATION/DECISION MAKING:   Critical Behavior:  Neurologic State: Drowsy, Eyes open to voice (intermittently opens eyes to command)  Orientation Level: Disoriented X4 (responds yes to most questions, not appropriate)  Cognition: Decreased command following     Hearing:   WFL  Skin:  grossly intact  Edema: noted R UE> L UE  Range Of Motion:  AROM: Grossly decreased, non-functional (moves all extremities, R bea-body weakness)           PROM: Generally decreased, functional           Strength:    Strength: Grossly decreased, non-functional (moves all extremities, R bea-body weakness)                    Tone & Sensation:                  Sensation:  (unable to assess formally due to cognition/impaired alertness)               Coordination:  Coordination: Generally decreased, functional  Vision:   Tracking: Unable to test secondary due to decreased visual attention (limited eye opening during eval)  Functional Mobility:  Bed Mobility:     Supine to Sit: Total assistance (utilizing bed functions, did not attempt EOB due to impaired alertness)        Balance:   Sitting:  (deferred EOB)    Functional Measure  Santoro Balance Test:    Sitting to Standin  Standing Unsupported: 0  Sitting with Back Unsupported: 0  Standing to Sittin  Transfers: 0  Standing Unsupported with Eyes Closed: 0  Standing Unsupported with Feet Together: 0  Reach Forward with Outstretched Arm: 0   Object: 0  Turn to Look Over Shoulders: 0  Turn 360 Degrees: 0  Alternate Foot on Step/Stool: 0  Standing Unsupported One Foot in Front: 0  Stand on One Le  Total: 0/56         56=Maximum possible score;   0-20=High fall risk  21-40=Moderate fall risk   41-56=Low fall risk        Physical Therapy Evaluation Charge Determination   History Examination Presentation Decision-Making   MEDIUM  Complexity : 1-2 comorbidities / personal factors will impact the outcome/ POC  HIGH Complexity : 4+ Standardized tests and measures addressing body structure, function, activity limitation and / or participation in recreation  HIGH Complexity : Unstable and unpredictable characteristics  HIGH Complexity : FOTO score of 1- 25       Based on the above components, the patient evaluation is determined to be of the following complexity level: HIGH     Pain Rating:  Did not interfere    Activity Tolerance:   Poor and minimal participation - drowsy    After treatment patient left in no apparent distress:   Supine in bed, Call bell within reach, Caregiver / family present, and Side rails x 3    COMMUNICATION/EDUCATION:   The patients plan of care was discussed with: Occupational therapist, Speech therapist, Registered nurse, and Physician. Patient unable to participate in BE FAST education due to drowsiness - will need education when able to participate. Fall prevention education was provided and the caregiver indicated understanding. and Patient is unable to participate in goal setting and plan of care. Caregiver participating in education/goal setting.     Thank you for this referral.  Eustace Phalen, PT   Time Calculation: 19 mins

## 2023-02-27 NOTE — PROGRESS NOTES
Spiritual Care Assessment/Progress Note  [address]      NAME: Abbie Allen III      MRN: [id number]  AGE: 81 y.o.  SEX: male  Restorationism Affiliation: Other   Language: English     2/27/2023     Total Time (in minutes): 11     Spiritual Assessment begun in [address] through conversation with:         []Patient        [] Family    [] Friend(s)        Reason for Consult: Initial/Spiritual assessment, critical care     Spiritual beliefs: (Please include comment if needed)     [] Identifies with a yumiko tradition:         [] Supported by a yumiko community:            [] Claims no spiritual orientation:           [] Seeking spiritual identity:                [] Adheres to an individual form of spirituality:           [x] Not able to assess:                           Identified resources for coping:      [] Prayer                               [] Music                  [] Guided Imagery     [] Family/friends                 [] Pet visits     [] Devotional reading                         [] Unknown     [] Other:                                               Interventions offered during this visit: (See comments for more details)    Patient Interventions: Initial visit, Other (comment) (Left note)     Family/Friend(s): Other (comment) (Left note)     Plan of Care:     [] Support spiritual and/or cultural needs    [] Support AMD and/or advance care planning process      [] Support grieving process   [] Coordinate Rites and/or Rituals    [] Coordination with community clergy   [] No spiritual needs identified at this time   [] Detailed Plan of Care below (See Comments)  [] Make referral to Music Therapy  [] Make referral to Pet Therapy     [] Make referral to Addiction services  [] Make referral to [facility]  [] Make referral to Spiritual Care Partner  [] No future visits requested        [] Contact Spiritual Care for further referrals     Comments: Visited Mr Iglesia Moon in ICU-11 for initial spiritual assessment; reviewed patient's chart prior to visit. Mr Mike Chatman was lying quietly in bed with eyes closed; no family was present at that time. Patient's nurse, Aleja Ybarra, was at his bedside and updated  on patient and family status. Left note assuring patient and family of ongoing  availability for support. : Rev. Tran Guallpa.  Chrissy Cabello; [facility], to contact [phone] call: [phone]

## 2023-02-27 NOTE — PROGRESS NOTES
Problem: Patient Education: Go to Patient Education Activity  Goal: Patient/Family Education  Outcome: Progressing Towards Goal     Problem: TIA/CVA Stroke: 0-24 hours  Goal: Activity/Safety  Outcome: Progressing Towards Goal  Goal: Consults, if ordered  Outcome: Progressing Towards Goal  Goal: Diagnostic Test/Procedures  Outcome: Progressing Towards Goal  Goal: Nutrition/Diet  Outcome: Progressing Towards Goal  Goal: Discharge Planning  Outcome: Progressing Towards Goal  Goal: Medications  Outcome: Progressing Towards Goal  Goal: Respiratory  Outcome: Progressing Towards Goal  Goal: Treatments/Interventions/Procedures  Outcome: Progressing Towards Goal  Goal: Psychosocial  Outcome: Progressing Towards Goal  Goal: *Hemodynamically stable  Outcome: Progressing Towards Goal  Goal: *Neurologically stable  Description: Absence of additional neurological deficits    Outcome: Progressing Towards Goal  Goal: *Verbalizes anxiety and depression are reduced or absent  Outcome: Progressing Towards Goal  Goal: *Absence of Signs of Aspiration on Current Diet  Outcome: Progressing Towards Goal  Goal: *Absence of deep venous thrombosis signs and symptoms(Stroke Metric)  Outcome: Progressing Towards Goal  Goal: *Ability to perform ADLs and demonstrates progressive mobility and function  Outcome: Progressing Towards Goal  Goal: *Stroke education started(Stroke Metric)  Outcome: Progressing Towards Goal  Goal: *Rehab consulted(Stroke Metric)  Outcome: Progressing Towards Goal     Problem: Ischemic Stroke: Discharge Outcomes  Goal: *Verbalizes anxiety and depression are reduced or absent  Outcome: Progressing Towards Goal  Goal: *Verbalize understanding of risk factor modification(Stroke Metric)  Outcome: Progressing Towards Goal  Goal: *Hemodynamically stable  Outcome: Progressing Towards Goal  Goal: *Absence of aspiration pneumonia  Outcome: Progressing Towards Goal  Goal: *Aware of needed dietary changes  Outcome: Progressing Towards Goal  Goal: *Verbalize understanding of prescribed medications including anti-coagulants, anti-lipid, and/or anti-platelets(Stroke Metric)  Outcome: Progressing Towards Goal  Goal: *Tolerating diet  Outcome: Progressing Towards Goal  Goal: *Aware of follow-up diagnostics related to anticoagulants  Outcome: Progressing Towards Goal  Goal: *Ability to perform ADLs and demonstrates progressive mobility and function  Outcome: Progressing Towards Goal  Goal: *Absence of DVT(Stroke Metric)  Outcome: Progressing Towards Goal  Goal: *Absence of aspiration  Outcome: Progressing Towards Goal  Goal: *Optimal pain control at patient's stated goal  Outcome: Progressing Towards Goal  Goal: *Home safety concerns addressed  Outcome: Progressing Towards Goal  Goal: *Describes available resources and support systems  Outcome: Progressing Towards Goal  Goal: *Verbalizes understanding of activation of EMS(911) for stroke symptoms(Stroke Metric)  Outcome: Progressing Towards Goal  Goal: *Understands and describes signs and symptoms to report to providers(Stroke Metric)  Outcome: Progressing Towards Goal  Goal: *Neurolgocially stable (absence of additional neurological deficits)  Outcome: Progressing Towards Goal  Goal: *Verbalizes importance of follow-up with primary care physician(Stroke Metric)  Outcome: Progressing Towards Goal  Goal: *Depression screening completed(Stroke Metric)  Outcome: Progressing Towards Goal

## 2023-02-27 NOTE — PROGRESS NOTES
SPEECH LANGUAGE PATHOLOGY BEDSIDE SWALLOW AND SPEECH EVALUATIONS  Patient: Rae Mas III (71 y.o. male)  Date: 2/27/2023  Primary Diagnosis: Stroke [facility]) [I63.9]       Precautions:   Fall (-140)    ASSESSMENT :  Based on the objective data described below, the patient presents with swallow function mostly related to pt's drowsiness. Pt intermittent rouses and able to swallow without difficulty. However, as pt becomes more drowsy pt with more overt s/s of aspiration and oral holding of puree. Therefore, would await improvement in mentation. Can repeat swallow screen if pt more awake and actively participating. Furthermore, pt with severe expressive and at least moderate receptive aphasia. Pt with perseveration on \"yeah\" consistently throughout session and to all questions asked. Pt did follow minimal commands with RN but mostly due to tactile cueing. Will benefit from further diagnostic tx as mentation improves. Patient will benefit from skilled intervention to address the above impairments. Patients rehabilitation potential is considered to be Fair     PLAN :  Recommendations and Planned Interventions:  --NPO with ice chips/teaspoons of water  --good oral care  --can complete swallow screen if mentation improves  --further aphasia treatment (do not try communication board given receptive language deficits)    Frequency/Duration: Patient will be followed by speech-language pathology 3 times a week to address goals. Discharge Recommendations: To Be Determined     SUBJECTIVE:   Patient stated, \"Yeah. Vilma Goodrich. OBJECTIVE:   No past medical history on file.   Past Surgical History:   Procedure Laterality Date    [facility] THROMB INFUSION INTRACRANIAL  2/26/2023     Prior Level of Function/Home Situation:   Home Situation  Home Environment: Private residence  # Steps to Enter: 4  Rails to Enter: Yes  Hand Rails : Bilateral  One/Two Story Residence: One story  Living Alone: No  Support Susana pt called back wanting to speak with you  Systems: Spouse/Significant Other  Current DME Used/Available at Home: CPAP  Tub or Shower Type: Tub/Shower combination  Diet prior to admission: regular diet/thin liquids  Current Diet:  NPO   Cognitive and Communication Status:  Neurologic State: Drowsy  Orientation Level:  (unable to assess aphasia)  Cognition: Decreased command following  Perception: Cues to attend to right side of body  Perseveration: Perseverates during conversation  Safety/Judgement: Lack of insight into deficits  Swallowing Evaluation:   Oral Assessment:  Oral Assessment  Labial:  (unable to assess.)  P. O. Trials:  Patient Position: upright in bed  Vocal quality prior to P.O.:  (pt very tired)  Consistency Presented: Thin liquid;Puree  How Presented: Cup/sip;Straw;Spoon     Bolus Acceptance: Impaired  Bolus Formation/Control: Impaired         Oral Phase Severity:  (difficult to assess due to drowsiness)  Pharyngeal Phase Severity :  (difficult to assess due to drowsiness)    NOMS:   The NOMS functional outcome measure was used to quantify this patient's level of swallowing impairment. Based on the NOMS, the patient was determined to be at level 2 for swallow function       NOMS Swallowing Levels:  Level 1 (CN): NPO  Level 2 (CM): NPO but takes consistency in therapy  Level 3 (CL): Takes less than 50% of nutrition p.o. and continues with nonoral feedings; and/or safe with mod cues; and/or max diet restriction  Level 4 (CK): Safe swallow but needs mod cues; and/or mod diet restriction; and/or still requires some nonoral feeding/supplements  Level 5 (CJ): Safe swallow with min diet restriction; and/or needs min cues  Level 6 (CI): Independent with p.o.; rare cues; usually self cues; may need to avoid some foods or needs extra time  Level 7 ([facility]): Independent for all p.o.  EDY. (2003). National Outcomes Measurement System (NOMS): Adult Speech-Language Pathology User's Guide.      Speech/Language Evaluation  Motor Speech:  Oral-Motor Structure/Motor Speech  Labial:  (unable to assess.)  Language Comprehension and Expression:  Auditory Comprehension  Auditory Impairment: Yes  Hearing Aid: None  Response to Basic Yes/No Questions (%):  (Perseverates on yes)  One-Step Basic Commands (%): 30 %  Verbal Expression  Primary Mode of Expression: Verbal  Initiation: Impaired (%)  Automatic Speech Task: Impaired (comment)  Repetition: Impaired        NOMS:   The NOMS functional outcome measure was used to quantify this patient's level of expressive language impairment. Based on the NOMS, the patient was determined to be at level 1 for spoken language expression. NOMS Spoken Language Expression:  Level 1 (CN): Verbalizations not meaningful to anyone. Level 2 (CM): Few attempts accurate/appropriate. Max cues to elicit automatic/imitative words/phrases. Level 3 (CL): Communication partner responsible for communication; Mod cues for words/phrases meaningful in context  Level 4 (CK): Initiate during simple, routine activities w/familiar partner. Mod cues to produce simple sentences  Level 5 (Janes Smaller): Initiates communication with familiar and unfamiliar partners. Min cues for complex sentences. Level 6 (CI): Communicates for most activities. Some limitations still present for vocational/social activities. Rarely cued for complex info  Level 7 [facility]): Independent communication. EDY. (2003). National Outcomes Measurement System (NOMS): Adult Speech-Language Pathology User's Guide. Pain:  Pain Scale 1: Adult Nonverbal Pain Scale  Pain Intensity 1: 0       After treatment:   Call bell within reach and Nursing notified    COMMUNICATION/EDUCATION:     The patient's plan of care including recommendations, planned interventions, and recommended diet changes were discussed with: Registered nurse. Patient is unable to participate in goal setting and plan of care.     Thank you for this referral.  KIMMIE Chavez  Time Calculation: 20 mins            Problem: Dysphagia (Adult)  Goal: *Acute Goals and Plan of Care (Insert Text)  Description: Speech Therapy Goals  Initiated 2/27/23    1. Patient will tolerate baseline diet without adverse effects within 7 days. Outcome: Progressing Towards Goal     Problem: Communication Impaired (Adult)  Goal: *Acute Goals and Plan of Care (Insert Text)  Description: Speech Therapy Goals  Initiated 2/27/23    1. Patient will participate in further diagnostic treatment regarding language.    Outcome: Progressing Towards Goal

## 2023-02-27 NOTE — PROGRESS NOTES
Critical care progress note. Patient is awake trying to talk very dysarthric very few words can be barely discerned. He nods consistently however denies headaches chest pain or dyspnea. He has been wearing his BiPAP at night, the hospital is BiPAP. Review of systems very limited nodded no to the above symptoms. Medications in the hospital reviewed. On examination he is in no distress vitals noted, afebrile. Blood pressure control systolic about 759. No respiratory distress. Head examination revealed no conjunctival pallor. Mucous membranes are moist.  The neck was supple no JVD but it was obese and difficult to assess. Chest was clear, somewhat decreased air entry but symmetrical.  Heart sounds were faint S1 and S2. Abdomen was obese, nontender. Lower extremities showed mild leg edema. Neurologically, difficult to assess orientation. Right upper and lower extremities weak.,  Neurological examination per neurology deferred  to. Labs today were reviewed and showed hemoglobin 14 WBC 7.7 platelet count 903 sodium 141 potassium 4.1 chloride under 114 bicarbonate 20 BUN 16 creatinine 1.38 compared to 1.5 yesterday. Liver functions unremarkable. MRI performed yesterday was reported as showing:Acute left MCA territory infarcts with associated hemorrhagic  Transformation. Assessment:  1. Ischemic stroke. Right-sided weakness. Status post transarterial mechanical thrombectomy. 2.  Little improvement in speech. 3.  Blood pressure fairly well controlled. Plan:  1. Continue blood pressure control. 2.  PT OT. 3.  Aspirin and antilipid medications.

## 2023-02-27 NOTE — ROUTINE PROCESS
1930: Bedside shift change report given to Marshall Donovan RN (oncoming nurse) by Keira Rodriguez RN (offgoing nurse). Report included the following information SBAR, Kardex, ED Summary, Procedure Summary, Intake/Output, MAR, Recent Results, Cardiac Rhythm SR, and Dual Neuro Assessment. 2300: NP Debbie Paulson updated on difficulty regulating pressures within goal. New orders received. 0730: Bedside shift change report given to Keira Rodriguez RN (oncoming nurse) by Marshall Donovan RN (offgoing nurse). Report included the following information SBAR, Kardex, ED Summary, Procedure Summary, Intake/Output, MAR, Recent Results, Cardiac Rhythm SR, and Dual Neuro Assessment.

## 2023-02-27 NOTE — PROGRESS NOTES
Neurocritical Care Brief Progress Note:    No acute neuro events overnight. Patient resting comfortably with family at bedside. No apparent discomfort. B/P goal is 100-140, but patient has Cardene maxed, and has used PRN hydralazine and labetalol. Increased Labetalol from 10 mg to 20 mg every 4 hours to help reach goal and wean some of the Cardene off. Physical Exam:  Gen: NAD, calm, cooperative  Neuro: A&O to self, will answer questions with \"right\" regardless of question asked. Will state his name. Follows commands minimally with moving extremities bilaterally. He will hold both arms up against gravity, with the right arm showing a drift after some assistance. Speech clear, but has severe expressive aphasia and some receptive aphasia. Affect normal. PERRL, 3 mm bilaterally. Blinks to threat. No disconjugate gaze present. EOMI. Face symmetric. Palate symmetric. Tongue midline. Lola spontaneously. Right sided weakness present and shows drift to RUE and RLE. Moves LUE and LLE spontaneously, no drift. It is difficult to assess strength due to lack of full command following. Bulk and tone appear normal. No involuntary movements. Gait deferred. Skin: Warm, dry, color appropriate for ethnicity. Groin arteriotomy site soft and non-tender on palpation, dressing is C/D/I, with no active bleeding or drainage noted.      NIHSS:      1a-LOC:0    1b-Month/Age:2    1c-Open/Close Hand:2    2-Best Gaze:1    3-Visual Fields:2    4-Facial Palsy:1    5a-Left Arm:0    5b-Right Arm:2    6a-Left Le    6b-Right Le    7-Limb Ataxia:0    8-Sensory:1    9-Best Language:2    10-Dysarthria:2    11-Extinction/Inattention:0  TOTAL SCORE: [address], [facility]  Neurocritical Care Nurse Practitioner  [phone]

## 2023-02-27 NOTE — PROGRESS NOTES
0730 Bedside and Verbal shift change report given to STEFFANY BRAMBILA RN (oncoming nurse) by Issa Macias RN (offgoing nurse). Report included the following information SBAR, Kardex, ED Summary, OR Summary, Procedure Summary, Intake/Output, MAR, Accordion, Recent Results, Med Rec Status, Cardiac Rhythm SINUS RHYTHM, Alarm Parameters , and Dual Neuro Assessment. 1126 ICU multidisciplinary rounds lead by MICHELLE JOYCE [facility], MD (Intensivist)  The following elements were discussed:   A. Pain Management Plan: not applicable  B. SAT/SBT:not applicable  C. Sedation and RASS Goal not applicable  D. CAM-ICU: Positive Prevention/Management & Sleep plan? no  Restraints: Needed? NO   Order Renewed? not applicable  E. Mobility Level   PT/OT Consulted: yes  F. Family Involvement: Has the family been updated in the last 24 hours? yes  Central line? NO Still Indicated? N/A  Stress Ulcer Prophylaxis Indicated/in place: NO  DVT Prophylaxis SCD's or Sequential Compression Device and Lovenox  Nutrition: NPO    Review of body systems, fluid balance I/Os, recent labs and imaging, length of ICU stay discussed. Daily Goals include: titrate down Cardene as able. (Note written by RN). 1530 Patient with >471 ml in bladder on bladder scan. 80 Shari, NP okay with patient being Q2 neuro checks. 1800 [address], MD would like patient straight cathed. If patient continues to retain, MD would like Patrick catheter placed. 1600 Patient straight cathed for 550 mL urine. New premofit applied post cath. 1930 Bedside and Verbal shift change report given to MICHELLE DOMINGO (oncoming nurse) by Nanci Hansen. MICHELLE BRAMBILA (offgoing nurse). Report included the following information SBAR, Kardex, ED Summary, OR Summary, Procedure Summary, Intake/Output, MAR, Accordion, Recent Results, Med Rec Status, Cardiac Rhythm SINUS RHYTHM, Alarm Parameters , and Dual Neuro Assessment.

## 2023-02-27 NOTE — PROGRESS NOTES
Reason for Admission:  Left MCA Occlusion                     RUR Score:   14%              Plan for utilizing home health:     NA     PCP: First and Last name:  None     Name of Practice:    Are you a current patient: Yes/No:    Approximate date of last visit:    Can you participate in a virtual visit with your PCP:                     Current Advanced Directive/Advance Care Plan: Full Code      Healthcare Decision Maker:   Click here to complete [address] including selection of the Healthcare Decision Maker Relationship (ie \"Primary\")           Keagan Elliott [phone]                  Transition of Care Plan:           Care manager met with patient's son Tangela Lin to introduce self and explain role. Patient lives independently with his wife kennedi single level home with 3 WILMER. DME includes a C pap machine , no previous HH or IPR needs. Patient's son confirmed patient does not have a PCP, [facility] [address] [facility] [place]. CM confirmed demographic information. Will follow for transitions of care.  Talita Quevedo RN Care Management      Care Management Interventions  MyChart Signup: Yes  Discharge Durable Medical Equipment: No  Physical Therapy Consult: Yes  Occupational Therapy Consult: Yes  Speech Therapy Consult: Yes  Support Systems: Spouse/Significant Other (Wife Cortney Elliott [phone], Claudeen Paulino [phone])

## 2023-02-27 NOTE — PROGRESS NOTES
Neurointerventional Surgery Progress Note  Neurocritical Care NP      Admit Date: 2/26/2023   LOS: 1 day      Daily Progress Note: 2/27/2023    Assessment:   Acute Left MCA Ischemic CVA due to left MCA occlusion with associated hemorrhagic conversion  Left ICA stenosis         Plan:   - continue rectal aspirin 300 mg AL daily for stroke prevention for now  - bilateral carotid duplex consistent with less than 50% stenosis of the right internal carotid artery. 70% stenosis of the left internal carotid artery  - will need left ICA stenosis treated with angioplasty and stenting, Dr. Keshia Rosas would like to treat during this admission  - lipid panel shows .2, continue Lipitor 40 mg QHS  - Hgb A1C ok at 5.5  - repeat Head CT tomorrow AM to reassess stroke  - ECHO pending   - PT/OT/SLP evals  - Neurology following     Activity: Bed rest  DVT ppx: SCDs, Lovenox  Dispo: TBD    Plan d/w Dr. Heath Nettles, RN, and patient's family. HPI:Junito Isidro III is a 81 y.o. male with PMH significant for HTN, pre-diabetes, and high cholesterol who initially presented to [facility] 2/27/23 with right-sided weakness and aphasia. The wife reported that he was fine the night before. The next morning around 6:30 am she noticed that the TV was still on in the living room and found the patient on the floor with difficulty talking. 911 was called and the patient was brought in to the ED as a stroke alert. CT of Head showed a left MCA territory infarct and probable MCA thrombus. No intracranial hemorrhage. CTA of H/N showed occlusion of the distal left MCA segment and severe stenosis of left cervical ICA. Mild stenosis right cervical ICA. NIS consulted and the patient was deemed a candidate for mechanical thrombectomy. He was not a candidate for TNK due to LKW time. He is not taking any blood thinners at home. He was transferred to [facility] for neurointervention. He was emergently taken to the angio suite for thrombectomy.  NIHSS 18 prior to procedure. He has no prior history of a stroke. Subjective:   Patient remains aphasic with right-sided weakness. No acute events overnight. Unable to obtain ROS due to aphasia.      Current Facility-Administered Medications   Medication Dose Route Frequency Provider Last Rate Last Admin    [START ON 2/28/2023] atorvastatin (LIPITOR) tablet 40 mg  40 mg Oral QHS Marlyn Breath, ARNP        sodium chloride (NS) flush 5-40 mL  5-40 mL IntraVENous Q8H Ines Metcalf MD   10 mL at 02/26/23 2223    sodium chloride (NS) flush 5-40 mL  5-40 mL IntraVENous PRN Ines Metcalf MD        acetaminophen (TYLENOL) tablet 650 mg  650 mg Oral Q6H PRN Ines Metcalf MD        Or    acetaminophen (TYLENOL) suppository 650 mg  650 mg Rectal Q6H PRN Ines Metcalf MD   650 mg at 02/27/23 0438    polyethylene glycol (MIRALAX) packet 17 g  17 g Oral DAILY PRN Ines Metcalf MD        ondansetron (ZOFRAN ODT) tablet 4 mg  4 mg Oral Q8H PRN Ines Metcalf MD        Or    ondansetron (ZOFRAN) injection 4 mg  4 mg IntraVENous Q6H PRN Ines Metcalf MD        sodium chloride (NS) flush 5-40 mL  5-40 mL IntraVENous Q8H Niurka Mccarthy NP   10 mL at 02/26/23 2222    sodium chloride (NS) flush 5-40 mL  5-40 mL IntraVENous PRN Niurka Mccarthy NP        enoxaparin (LOVENOX) injection 30 mg  30 mg SubCUTAneous Q12H Ines Metcalf MD   30 mg at 02/27/23 0847    niCARdipine (CARDENE) 25 mg in 0.9% sodium chloride 250 mL ([id number])  0-15 mg/hr IntraVENous TITRATE Niurka Mccarthy  mL/hr at 02/27/23 0903 10 mg/hr at 02/27/23 0903    0.9% sodium chloride infusion  75 mL/hr IntraVENous CONTINUOUS Niurka Mccarthy NP 75 mL/hr at 02/27/23 0442 75 mL/hr at 02/27/23 0442    hydrALAZINE (APRESOLINE) 20 mg/mL injection 20 mg  20 mg IntraVENous Q6H PRN Niurka Mccarthy NP   20 mg at 02/26/23 2222    aspirin (ASA) suppository 300 mg  300 mg Rectal DAILY Niurka Mccarthy NP   300 mg at 02/27/23 0847    labetaloL (NORMODYNE;TRANDATE) injection 20 mg  20 mg IntraVENous Q4H PRN Kodi Lemus, ARNP   20 mg at 23 0017        No Known Allergies    Review of Systems:  Review of systems not obtained due to patient factors. Objective:     Vital signs  Temp (24hrs), Av.3 °F (37.4 °C), Min:97.7 °F (36.5 °C), Max:101 °F (38.3 °C)   No intake/output data recorded.  190 -  0700  In: 3486.3 [I.V.:3486.3]  Out: 2100 [Urine:1900]    Visit Vitals  BP (!) 144/57   Pulse 81   Temp 98.8 °F (37.1 °C)   Resp 20   Ht 5' 8\" (1.727 m)   Wt 328 lb 4.2 oz (148.9 kg)   SpO2 96%   BMI 49.91 kg/m²    O2 Flow Rate (L/min): 2 l/min O2 Device: BIPAP       Intake/Output Summary (Last 24 hours) at 2023 5288  Last data filed at 2023 0400  Gross per 24 hour   Intake 3486.25 ml   Output 2100 ml   Net 1386.25 ml      Physical Exam:  GENERAL: drowsy, eyes open to voice   LUNG: clear to auscultation bilaterally  HEART: regular rate and rhythm, S1, S2 normal, no murmur, click, rub or gallop  EXTREMITIES:  extremities normal, atraumatic, no cyanosis, generalized peripheral edema, pedal pulses present, posterior tibial pulses faint with palpation, but all distal foot pulses present with doppler. SKIN: Skin warm to touch. Appropriate for ethnicity. Right groin site clean, dry, and intact. No hematoma, bleeding, or bruising noted. Neurologic Exam:  Mental Status:             Drowsy, eyes open to voice. Language:                   Severe expressive aphasia and some receptive aphasia. Will mumble yes at times, but repeats it. Cranial Nerves:                                                 Pupils equal, round and reactive to light. No blink to threat in right eye. Blinks to threat in left eye. Right-sided visual neglect. EOMs intact                                       Mild right facial droop. Severe dysarthria. Motor:                          Right-sided weakness present. RUE and RLE drift present. Moves LUE and LLE with no drift. Difficult to fully assess strength as patient is not following tasks consistently. No involuntary movements. Sensation:                   Withdraws to pain in all extremities. Reflexes:                     Deferred. Coordination & Gait:   FTN intact on left side. Unable to perform FTN and HTS on the right side due to weakness. Patient would not follow task with HTS on left leg. Gait deferred. NIHSS:      1a-LOC:1    1b-Month/Age:2    1c-Open/Close Hand:2    2-Best Gaze:0    3-Visual Fields:2    4-Facial Palsy:1    5a-Left Arm:0    5b-Right Arm:1    6a-Left Le    6b-Right Le    7-Limb Ataxia:0    8-Sensory:0    9-Best Language:2    10-Dysarthria:2    11-Extinction/Inattention:0  TOTAL SCORE:13    24 hour results:  Recent Results (from the past 12 hour(s))   CBC WITH AUTOMATED DIFF    Collection Time: 23  5:40 AM   Result Value Ref Range    WBC 7.7 4.1 - 11.1 K/uL    RBC 4.46 4.10 - 5.70 M/uL    HGB 14.0 12.1 - 17.0 g/dL    HCT 42.7 36.6 - 50.3 %    MCV 95.7 80.0 - 99.0 FL    MCH 31.4 26.0 - 34.0 PG    MCHC 32.8 30.0 - 36.5 g/dL    RDW 13.4 11.5 - 14.5 %    PLATELET 763 842 - 624 K/uL    MPV 9.2 8.9 - 12.9 FL    NRBC 0.0 0  WBC    ABSOLUTE NRBC 0.00 0.00 - 0.01 K/uL    NEUTROPHILS 86 (H) 32 - 75 %    LYMPHOCYTES 9 (L) 12 - 49 %    MONOCYTES 5 5 - 13 %    EOSINOPHILS 0 0 - 7 %    BASOPHILS 0 0 - 1 %    IMMATURE GRANULOCYTES 0 0.0 - 0.5 %    ABS. NEUTROPHILS 6.6 1.8 - 8.0 K/UL    ABS. LYMPHOCYTES 0.7 (L) 0.8 - 3.5 K/UL    ABS. MONOCYTES 0.4 0.0 - 1.0 K/UL    ABS. EOSINOPHILS 0.0 0.0 - 0.4 K/UL    ABS. BASOPHILS 0.0 0.0 - 0.1 K/UL    ABS. IMM.  GRANS. 0.0 0.00 - 0.04 K/UL    DF SMEAR SCANNED      RBC COMMENTS MACROCYTOSIS  1+       METABOLIC PANEL, COMPREHENSIVE    Collection Time: 23  5:40 AM   Result Value Ref Range    Sodium 141 136 - 145 mmol/L    Potassium 4.1 3.5 - 5.1 mmol/L    Chloride 114 (H) 97 - 108 mmol/L    CO2 20 (L) 21 - 32 mmol/L    Anion gap 7 5 - 15 mmol/L    Glucose 144 (H) 65 - 100 mg/dL    BUN 16 6 - 20 MG/DL    Creatinine 1.38 (H) 0.70 - 1.30 MG/DL    BUN/Creatinine ratio 12 12 - 20      eGFR 55 (L) >60 ml/min/1.73m2    Calcium 7.9 (L) 8.5 - 10.1 MG/DL    Bilirubin, total 0.8 0.2 - 1.0 MG/DL    ALT (SGPT) 20 12 - 78 U/L    AST (SGOT) 19 15 - 37 U/L    Alk. phosphatase 69 45 - 117 U/L    Protein, total 5.8 (L) 6.4 - 8.2 g/dL    Albumin 2.9 (L) 3.5 - 5.0 g/dL    Globulin 2.9 2.0 - 4.0 g/dL    A-G Ratio 1.0 (L) 1.1 - 2.2     MAGNESIUM    Collection Time: 02/27/23  5:40 AM   Result Value Ref Range    Magnesium 2.1 1.6 - 2.4 mg/dL         Imaging:  CTH: CT evidence of left MCA territory infarct and probable MCA thrombus. No intracranial hemorrhage. CTA: Occlusion of distal M1 segment of left MCA. Left MCA territory infarction. Severe stenosis left cervical ICA. Mild stenosis right cervical ICA. MRI: Acute left MCA territory infarcts with associated hemorrhagic transformation. Bilateral carotid duplex 2/26/23: Consistent with less than 50% stenosis of the right internal carotid artery. Consistent with greater than 70% stenosis of the left internal carotid artery.       Matthew Hay NP  Neurocritical Care Nurse Practitioner

## 2023-02-27 NOTE — PROGRESS NOTES
Problem: Self Care Deficits Care Plan (Adult)  Goal: *Acute Goals and Plan of Care (Insert Text)  Description: FUNCTIONAL STATUS PRIOR TO ADMISSION: Patient was independent and active without use of DME. Patient was independent for basic and instrumental ADLs. Patient assists his wife at home typically. HOME SUPPORT: The patient lived with his wife but did not require assist.    Occupational Therapy Goals  Initiated 2/27/2023   1. Patient will perform grooming in sitting with moderate assistance  within 7 day(s). 2.  Patient will perform lower body dressing with maximal assistance within 7 day(s). 3.  Patient will perform anterior bathing from neck to thighs with minimal assistance/contact guard assist within 7 day(s). 4.  Patient will perform toilet transfers to [facility] with maximal assistance within 7 day(s). 5.  Patient will perform all aspects of toileting with moderate assistance  within 7 day(s). 6.  Patient will participate in upper extremity therapeutic exercise/activities with minimal assistance/contact guard assist within 7 day(s). 7.  Patient will utilize energy conservation techniques during functional activities with verbal and visual cues within 7 day(s). 8.  Patient will participate in [facility] assessment in [facility] for ADLs within 7 days. Outcome: Not Met   OCCUPATIONAL THERAPY EVALUATION  Patient: Wendi Enriquez III [de-identified]81 y.o. male)  Date: 2/27/2023  Primary Diagnosis: Stroke [facility]) [I63.9]       Precautions: Fall (-140)    ASSESSMENT  Based on the objective data described below, the patient presents with right sided weakness, follows commands in all extremities, difficulty maintaining arousal, limited eye opening, limited verbalizations and answering majority of yes/no questions with yes and not answering appropriately, drowsy and lethargic throughout session, and requiring significant assist for all self care and functional mobility/transfers.  Patient initially presented with right sided weakness/flaccid, garbled speech. Was found down on floor in AM, LKW was the night before. Patient underwent MRI of brain which showers acute left MCA territory infarcts with associated hemorrhagic transformation. Patient received semi supine in bed with CPAP on, initial difficulty awakening patient but improved alertness once CPAP mask removed. Patient following commands in all extremities, noted right sided weakness, right upper extremity grossly 2+/5 and left upper extremity grossly 3+/5 but likely due to difficulty following complex commands. Patient assisted to wash face in attempt to increase alertness. Patient spontaneously attempting to use right upper extremity during session to wave at daughter in room, daughter reports patient is primarily left handed but is somewhat ambidextrous. Patient was independent prior to admission and typically assists wife as primary caregiver due to multiple medical issues. Patient is well below reported functional baseline at this time S/p acute CVA as pt independent and active prior to admission. Patient would benefit from skilled OT services during admission to improve independence with self care and functional mobility/transfers. Recommend discharge to [facility] at this time due to high prior level of function and severity of deficits. Current Level of Function Impacting Discharge (ADLs/self-care): total A bed mobility, max to total A for all self care tasks    Functional Outcome Measure: The patient scored 10/24 on the AM-PAC Assessment which is indicative of significant deficits in basic self care tasks. Other factors to consider for discharge: prior level of function independent, helps to care for his wife, severity of deficits, family support     Patient will benefit from skilled therapy intervention to address the above noted impairments.        PLAN :  Recommendations and Planned Interventions: self care training, functional mobility training, therapeutic exercise, balance training, visual/perceptual training, therapeutic activities, cognitive retraining, endurance activities, neuromuscular re-education, patient education, home safety training, and family training/education    Frequency/Duration: Patient will be followed by occupational therapy 5 times a week to address goals. Recommendation for discharge: (in order for the patient to meet his/her long term goals)  Therapy 3 hours per day 5-7 days per week    This discharge recommendation:  Has not yet been discussed the attending provider and/or case management    IF patient discharges home will need the following DME: TBD pending progress       SUBJECTIVE:   Patient stated yes.  to almost every yes/no question asked, not answering appropriately. OBJECTIVE DATA SUMMARY:   HISTORY:   No past medical history on file. Past Surgical History:   Procedure Laterality Date    [facility] THROMB INFUSION INTRACRANIAL  2/26/2023     Expanded or extensive additional review of patient history:     Home Situation  Home Environment: Private residence  # Steps to Enter: 4  Rails to Enter: Yes  Hand Rails : Bilateral  One/Two Story Residence: One story  Living Alone: No  Support Systems: Spouse/Significant Other  Current DME Used/Available at Home: CPAP  Tub or Shower Type: Tub/Shower combination    Hand dominance: Left, daughter reports he uses both but left is primary    EXAMINATION OF PERFORMANCE DEFICITS:  Cognitive/Behavioral Status:  Neurologic State: Drowsy  Orientation Level:  (unable to assess aphasia)  Cognition: Decreased command following  Perception: Cues to attend to right side of body  Perseveration: Perseverates during conversation  Safety/Judgement: Lack of insight into deficits    Skin: intact where visible    Edema: extremity swelling, right upper extremity propped on pillow at end of session and heels floated    Hearing:   Auditory  Auditory Impairment: None    Vision/Perceptual:    Tracking: Unable to test secondary due to decreased visual attention (limited eye opening during eval)                                Range of Motion:  AROM: Grossly decreased, non-functional (moves all extremities, R bea-body weakness)  PROM: Generally decreased, functional                      Strength:  Strength: Grossly decreased, non-functional (moves all extremities, R bea-body weakness)                Coordination:  Coordination: Generally decreased, functional            Tone & Sensation:     Sensation:  (unable to assess formally due to cognition/impaired alertness)                      Balance:  Sitting:  (deferred EOB)    Functional Mobility and Transfers for ADLs:  Bed Mobility:  Supine to Sit: Total assistance (utilizing bed functions, did not attempt EOB due to impaired alertness)    Transfers: Toilet Transfer : Total assistance (infer)    ADL Assessment:  Feeding: Maximum assistance (infer from functional reach)    Oral Facial Hygiene/Grooming: Maximum assistance    Bathing: Maximum assistance (infer from functional reach and LE access)    Type of Bath: Chlorhexidine (CHG); Full    Upper Body Dressing: Maximum assistance (infer from functional reach)    Lower Body Dressing: Total assistance    Toileting: Total assistance (infer bed level)                ADL Intervention and task modifications:       Grooming  Position Performed: Other (comment) (HOB raised)  Washing Face: Maximum assistance         Type of Bath: Chlorhexidine (CHG); Full              Lower Body Dressing Assistance  Socks: Total assistance (dependent)  Leg Crossed Method Used: No  Position Performed: Supine         Cognitive Retraining  Safety/Judgement: Lack of insight into deficits     Functional Measure:  [facility] AM-PAC®      Daily Activity Inpatient Short Form (6-Clicks) Version 2  How much HELP from another person do you currently need. .. (If the patient hasn't done an activity recently, how much help from another person do you think they would need if they tried?) Total A Lot A Little None   1. Putting on and taking off regular lower body clothing? [x]   1 []   2 []   3 []   4   2. Bathing (including washing, rinsing, drying)? []   1 [x]   2 []   3 []   4   3. Toileting, which includes using toilet, bedpan, or urinal? [x]   1 []   2 []   3 []   4   4. Putting on and taking off regular upper body clothing? []   1 [x]   2 []   3 []   4   5. Taking care of personal grooming such as brushing teeth? []   1 [x]   2 []   3 []   4   6. Eating meals? []   1 [x]   2 []   3 []   4     Raw Score: 10/24                            Cutoff score ?191,2,3 had higher odds of discharging home with home health or need of SNF/IPR    1. Katherine Larath Cardinal Lvmae Riding. Validity of the AM-PAC 6-Clicks Inpatient Daily Activity and Basic Mobility Short Forms. Physical Therapy Mar 2014, [phone]) [phone]; DOI: 10.2522/ptj.73652643  2. Lulu Villegas. Association of AM-PAC \"6-Clicks\" Basic Mobility and Daily Activity Scores With Discharge Destination. Phys Ther. 2021 Apr 4;101(4):ykkf362. doi: 10.1093/ptj/gedh070. PMID: 70691565. V Radha Rodriguez, Jyothi D, Lizzy Garcia, Elsa K, Vero S. Activity Measure for Post-Acute Care \"6-Clicks\" Basic Mobility Scores Predict Discharge Destination After Acute Care Hospitalization in Select Patient Groups: A Retrospective, Observational Study. Arch Rehabil Res Clin Transl. 2022 Jul 16;4(3):761644. doi: 10.1016/j.arrct. 5610.411144. PMID: 87949155; PMCID: [id number]. 4. Mirta Godfrey, Regine OSHEA. AM-PAC Short Forms Manual 4.0. Revised 2/2020.     Fugl-Funk Assessment of Motor Recovery after Stroke:   Unable to maintain alertness for full participation    Occupational Therapy Evaluation Charge Determination   History Examination Decision-Making   LOW Complexity : Brief history review  MEDIUM Complexity : 3-5 performance deficits relating to physical, cognitive , or psychosocial skils that result in activity limitations and / or participation restrictions LOW Complexity : No comorbidities that affect functional and no verbal or physical assistance needed to complete eval tasks       Based on the above components, the patient evaluation is determined to be of the following complexity level: LOW   Pain Rating:  None reported    Activity Tolerance:   Poor, SpO2 stable on RA, requires rest breaks, and wears CPAP when sleeping    After treatment patient left in no apparent distress:    Supine in bed, Call bell within reach, Caregiver / family present, and Side rails x 3    COMMUNICATION/EDUCATION:   The patients plan of care was discussed with: Physical therapist, Speech therapist, and Registered nurse. Patient was educated regarding his deficit(s) of right sided weakness, impaired coordination as this relates to his diagnosis of L MCA infarct. He demonstrated Guarded understanding as evidenced by limited alertness throughout session. Patient/family agree to work toward stated goals and plan of care. This patients plan of care is appropriate for delegation to \[facility] [place] [facility]\"".     Thank you for this referral.  Thomas Vital OTR/L  Time Calculation: 18 mins Stable

## 2023-02-28 ENCOUNTER — APPOINTMENT (OUTPATIENT)
Dept: NON INVASIVE DIAGNOSTICS | Age: 73
DRG: 023 | End: 2023-02-28
Attending: NURSE PRACTITIONER
Payer: MEDICARE

## 2023-02-28 ENCOUNTER — APPOINTMENT (OUTPATIENT)
Dept: CT IMAGING | Age: 73
DRG: 023 | End: 2023-02-28
Attending: NURSE PRACTITIONER
Payer: MEDICARE

## 2023-02-28 ENCOUNTER — APPOINTMENT (OUTPATIENT)
Dept: GENERAL RADIOLOGY | Age: 73
DRG: 023 | End: 2023-02-28
Attending: INTERNAL MEDICINE
Payer: MEDICARE

## 2023-02-28 LAB
ALBUMIN SERPL-MCNC: 2.5 G/DL (ref 3.5–5)
ALBUMIN/GLOB SERPL: 0.8 (ref 1.1–2.2)
ALP SERPL-CCNC: 61 U/L (ref 45–117)
ALT SERPL-CCNC: 20 U/L (ref 12–78)
ANION GAP SERPL CALC-SCNC: 6 MMOL/L (ref 5–15)
AST SERPL-CCNC: 30 U/L (ref 15–37)
ATRIAL RATE: 80 BPM
BASOPHILS # BLD: 0 K/UL (ref 0–0.1)
BASOPHILS NFR BLD: 0 % (ref 0–1)
BILIRUB SERPL-MCNC: 0.9 MG/DL (ref 0.2–1)
BUN SERPL-MCNC: 21 MG/DL (ref 6–20)
BUN/CREAT SERPL: 16 (ref 12–20)
CALCIUM SERPL-MCNC: 8.2 MG/DL (ref 8.5–10.1)
CALCULATED P AXIS, ECG09: 34 DEGREES
CALCULATED R AXIS, ECG10: 21 DEGREES
CALCULATED T AXIS, ECG11: 49 DEGREES
CHLORIDE SERPL-SCNC: 115 MMOL/L (ref 97–108)
CO2 SERPL-SCNC: 20 MMOL/L (ref 21–32)
CREAT SERPL-MCNC: 1.35 MG/DL (ref 0.7–1.3)
DIAGNOSIS, 93000: NORMAL
DIFFERENTIAL METHOD BLD: ABNORMAL
ECHO AO ROOT DIAM: 4.1 CM
ECHO AO ROOT INDEX: 1.63 CM/M2
ECHO AV AREA PEAK VELOCITY: 2.3 CM2
ECHO AV AREA/BSA PEAK VELOCITY: 0.9 CM2/M2
ECHO AV PEAK GRADIENT: 15 MMHG
ECHO AV PEAK VELOCITY: 2 M/S
ECHO AV VELOCITY RATIO: 0.55
ECHO EST RA PRESSURE: 15 MMHG
ECHO LA DIAMETER INDEX: 1.47 CM/M2
ECHO LA DIAMETER: 3.7 CM
ECHO LA TO AORTIC ROOT RATIO: 0.9
ECHO LA VOL 2C: 40 ML (ref 18–58)
ECHO LA VOL 4C: 72 ML (ref 18–58)
ECHO LA VOLUME AREA LENGTH: 65 ML
ECHO LA VOLUME INDEX A2C: 16 ML/M2 (ref 16–34)
ECHO LA VOLUME INDEX A4C: 29 ML/M2 (ref 16–34)
ECHO LA VOLUME INDEX AREA LENGTH: 26 ML/M2 (ref 16–34)
ECHO LV E' LATERAL VELOCITY: 10 CM/S
ECHO LV E' SEPTAL VELOCITY: 9 CM/S
ECHO LV FRACTIONAL SHORTENING: 30 % (ref 28–44)
ECHO LV INTERNAL DIMENSION DIASTOLE INDEX: 1.47 CM/M2
ECHO LV INTERNAL DIMENSION DIASTOLIC: 3.7 CM (ref 4.2–5.9)
ECHO LV INTERNAL DIMENSION SYSTOLIC INDEX: 1.03 CM/M2
ECHO LV INTERNAL DIMENSION SYSTOLIC: 2.6 CM
ECHO LV IVSD: 1.5 CM (ref 0.6–1)
ECHO LV MASS 2D: 208.7 G (ref 88–224)
ECHO LV MASS INDEX 2D: 82.8 G/M2 (ref 49–115)
ECHO LV POSTERIOR WALL DIASTOLIC: 1.5 CM (ref 0.6–1)
ECHO LV RELATIVE WALL THICKNESS RATIO: 0.81
ECHO LVOT AREA: 4.2 CM2
ECHO LVOT DIAM: 2.3 CM
ECHO LVOT PEAK GRADIENT: 5 MMHG
ECHO LVOT PEAK VELOCITY: 1.1 M/S
ECHO MV A VELOCITY: 0.95 M/S
ECHO MV AREA PHT: 2.8 CM2
ECHO MV E DECELERATION TIME (DT): 271 MS
ECHO MV E VELOCITY: 1.15 M/S
ECHO MV E/A RATIO: 1.21
ECHO MV E/E' LATERAL: 11.5
ECHO MV E/E' RATIO (AVERAGED): 12.14
ECHO MV E/E' SEPTAL: 12.78
ECHO MV PRESSURE HALF TIME (PHT): 78.6 MS
ECHO MV REGURGITANT PEAK GRADIENT: 130 MMHG
ECHO MV REGURGITANT PEAK VELOCITY: 5.7 M/S
ECHO PV MAX VELOCITY: 1.3 M/S
ECHO PV PEAK GRADIENT: 7 MMHG
ECHO RIGHT VENTRICULAR SYSTOLIC PRESSURE (RVSP): 59 MMHG
ECHO RV TAPSE: 2.5 CM (ref 1.7–?)
ECHO TV REGURGITANT MAX VELOCITY: 3.31 M/S
ECHO TV REGURGITANT PEAK GRADIENT: 44 MMHG
EOSINOPHIL # BLD: 0 K/UL (ref 0–0.4)
EOSINOPHIL NFR BLD: 0 % (ref 0–7)
ERYTHROCYTE [DISTWIDTH] IN BLOOD BY AUTOMATED COUNT: 13.8 % (ref 11.5–14.5)
GLOBULIN SER CALC-MCNC: 3.1 G/DL (ref 2–4)
GLUCOSE SERPL-MCNC: 130 MG/DL (ref 65–100)
HCT VFR BLD AUTO: 38.7 % (ref 36.6–50.3)
HGB BLD-MCNC: 12.7 G/DL (ref 12.1–17)
IMM GRANULOCYTES # BLD AUTO: 0 K/UL (ref 0–0.04)
IMM GRANULOCYTES NFR BLD AUTO: 0 % (ref 0–0.5)
LYMPHOCYTES # BLD: 0.8 K/UL (ref 0.8–3.5)
LYMPHOCYTES NFR BLD: 11 % (ref 12–49)
MAGNESIUM SERPL-MCNC: 2.1 MG/DL (ref 1.6–2.4)
MCH RBC QN AUTO: 31.8 PG (ref 26–34)
MCHC RBC AUTO-ENTMCNC: 32.8 G/DL (ref 30–36.5)
MCV RBC AUTO: 97 FL (ref 80–99)
MONOCYTES # BLD: 0.4 K/UL (ref 0–1)
MONOCYTES NFR BLD: 6 % (ref 5–13)
NEUTS SEG # BLD: 5.9 K/UL (ref 1.8–8)
NEUTS SEG NFR BLD: 83 % (ref 32–75)
NRBC # BLD: 0 K/UL (ref 0–0.01)
NRBC BLD-RTO: 0 PER 100 WBC
P-R INTERVAL, ECG05: 196 MS
PLATELET # BLD AUTO: 166 K/UL (ref 150–400)
PMV BLD AUTO: 9.3 FL (ref 8.9–12.9)
POTASSIUM SERPL-SCNC: 4.5 MMOL/L (ref 3.5–5.1)
PROT SERPL-MCNC: 5.6 G/DL (ref 6.4–8.2)
Q-T INTERVAL, ECG07: 370 MS
QRS DURATION, ECG06: 80 MS
QTC CALCULATION (BEZET), ECG08: 426 MS
RBC # BLD AUTO: 3.99 M/UL (ref 4.1–5.7)
SODIUM SERPL-SCNC: 141 MMOL/L (ref 136–145)
VENTRICULAR RATE, ECG03: 80 BPM
WBC # BLD AUTO: 7.2 K/UL (ref 4.1–11.1)

## 2023-02-28 PROCEDURE — 77030020365 HC SOL INJ SOD CL 0.9% 50ML

## 2023-02-28 PROCEDURE — 94660 CPAP INITIATION&MGMT: CPT

## 2023-02-28 PROCEDURE — C1751 CATH, INF, PER/CENT/MIDLINE: HCPCS

## 2023-02-28 PROCEDURE — 74011250636 HC RX REV CODE- 250/636: Performed by: INTERNAL MEDICINE

## 2023-02-28 PROCEDURE — 92526 ORAL FUNCTION THERAPY: CPT | Performed by: SPEECH-LANGUAGE PATHOLOGIST

## 2023-02-28 PROCEDURE — 36573 INSJ PICC RS&I 5 YR+: CPT | Performed by: INTERNAL MEDICINE

## 2023-02-28 PROCEDURE — 76937 US GUIDE VASCULAR ACCESS: CPT

## 2023-02-28 PROCEDURE — 74011000250 HC RX REV CODE- 250: Performed by: ANESTHESIOLOGY

## 2023-02-28 PROCEDURE — 70450 CT HEAD/BRAIN W/O DYE: CPT

## 2023-02-28 PROCEDURE — 74011250637 HC RX REV CODE- 250/637

## 2023-02-28 PROCEDURE — 85025 COMPLETE CBC W/AUTO DIFF WBC: CPT

## 2023-02-28 PROCEDURE — 71045 X-RAY EXAM CHEST 1 VIEW: CPT

## 2023-02-28 PROCEDURE — 36415 COLL VENOUS BLD VENIPUNCTURE: CPT

## 2023-02-28 PROCEDURE — 83735 ASSAY OF MAGNESIUM: CPT

## 2023-02-28 PROCEDURE — 74011250636 HC RX REV CODE- 250/636: Performed by: NURSE PRACTITIONER

## 2023-02-28 PROCEDURE — 74011250636 HC RX REV CODE- 250/636: Performed by: ANESTHESIOLOGY

## 2023-02-28 PROCEDURE — 74011250637 HC RX REV CODE- 250/637: Performed by: NURSE PRACTITIONER

## 2023-02-28 PROCEDURE — 74011000250 HC RX REV CODE- 250: Performed by: NURSE PRACTITIONER

## 2023-02-28 PROCEDURE — 65610000006 HC RM INTENSIVE CARE

## 2023-02-28 PROCEDURE — 93306 TTE W/DOPPLER COMPLETE: CPT

## 2023-02-28 PROCEDURE — 99232 SBSQ HOSP IP/OBS MODERATE 35: CPT | Performed by: PSYCHIATRY & NEUROLOGY

## 2023-02-28 PROCEDURE — 74011000250 HC RX REV CODE- 250: Performed by: INTERNAL MEDICINE

## 2023-02-28 PROCEDURE — 80053 COMPREHEN METABOLIC PANEL: CPT

## 2023-02-28 PROCEDURE — 51798 US URINE CAPACITY MEASURE: CPT

## 2023-02-28 PROCEDURE — 97530 THERAPEUTIC ACTIVITIES: CPT

## 2023-02-28 PROCEDURE — 97112 NEUROMUSCULAR REEDUCATION: CPT

## 2023-02-28 RX ORDER — CLOPIDOGREL BISULFATE 75 MG/1
75 TABLET ORAL DAILY
Status: DISCONTINUED | OUTPATIENT
Start: 2023-03-01 | End: 2023-02-28

## 2023-02-28 RX ORDER — CLOPIDOGREL BISULFATE 75 MG/1
75 TABLET ORAL DAILY
Status: DISCONTINUED | OUTPATIENT
Start: 2023-03-01 | End: 2023-03-01

## 2023-02-28 RX ORDER — METOPROLOL TARTRATE 5 MG/5ML
5 INJECTION INTRAVENOUS EVERY 4 HOURS
Status: DISCONTINUED | OUTPATIENT
Start: 2023-02-28 | End: 2023-03-01

## 2023-02-28 RX ORDER — ASPIRIN 300 MG/1
300 SUPPOSITORY RECTAL DAILY
Status: DISCONTINUED | OUTPATIENT
Start: 2023-03-01 | End: 2023-03-01

## 2023-02-28 RX ORDER — GUAIFENESIN 100 MG/5ML
325 LIQUID (ML) ORAL ONCE
Status: COMPLETED | OUTPATIENT
Start: 2023-02-28 | End: 2023-02-28

## 2023-02-28 RX ORDER — CLOPIDOGREL BISULFATE 75 MG/1
150 TABLET ORAL ONCE
Status: COMPLETED | OUTPATIENT
Start: 2023-02-28 | End: 2023-02-28

## 2023-02-28 RX ORDER — BACITRACIN 500 UNIT/G
1 PACKET (EA) TOPICAL AS NEEDED
Status: DISCONTINUED | OUTPATIENT
Start: 2023-02-28 | End: 2023-03-07 | Stop reason: HOSPADM

## 2023-02-28 RX ORDER — FUROSEMIDE 10 MG/ML
20 INJECTION INTRAMUSCULAR; INTRAVENOUS ONCE
Status: COMPLETED | OUTPATIENT
Start: 2023-02-28 | End: 2023-02-28

## 2023-02-28 RX ADMIN — METOPROLOL TARTRATE 2.5 MG: 5 INJECTION, SOLUTION INTRAVENOUS at 08:21

## 2023-02-28 RX ADMIN — SODIUM CHLORIDE, PRESERVATIVE FREE 10 ML: 5 INJECTION INTRAVENOUS at 05:47

## 2023-02-28 RX ADMIN — SODIUM CHLORIDE, PRESERVATIVE FREE 10 ML: 5 INJECTION INTRAVENOUS at 22:26

## 2023-02-28 RX ADMIN — SODIUM CHLORIDE, PRESERVATIVE FREE 10 ML: 5 INJECTION INTRAVENOUS at 22:27

## 2023-02-28 RX ADMIN — METOPROLOL TARTRATE 2.5 MG: 5 INJECTION, SOLUTION INTRAVENOUS at 04:04

## 2023-02-28 RX ADMIN — NICARDIPINE HYDROCHLORIDE 12.5 MG/HR: 25 INJECTION, SOLUTION INTRAVENOUS at 06:16

## 2023-02-28 RX ADMIN — NICARDIPINE HYDROCHLORIDE 2.5 MG/HR: 25 INJECTION, SOLUTION INTRAVENOUS at 20:47

## 2023-02-28 RX ADMIN — METOPROLOL TARTRATE 5 MG: 5 INJECTION, SOLUTION INTRAVENOUS at 19:10

## 2023-02-28 RX ADMIN — ASPIRIN 324 MG: 81 TABLET, CHEWABLE ORAL at 15:44

## 2023-02-28 RX ADMIN — METOPROLOL TARTRATE 5 MG: 5 INJECTION, SOLUTION INTRAVENOUS at 14:56

## 2023-02-28 RX ADMIN — METOPROLOL TARTRATE 5 MG: 5 INJECTION, SOLUTION INTRAVENOUS at 22:22

## 2023-02-28 RX ADMIN — ENALAPRILAT 1.25 MG: 1.25 INJECTION INTRAVENOUS at 00:56

## 2023-02-28 RX ADMIN — NICARDIPINE HYDROCHLORIDE 12.5 MG/HR: 25 INJECTION, SOLUTION INTRAVENOUS at 03:57

## 2023-02-28 RX ADMIN — SODIUM CHLORIDE, PRESERVATIVE FREE 10 ML: 5 INJECTION INTRAVENOUS at 14:56

## 2023-02-28 RX ADMIN — NICARDIPINE HYDROCHLORIDE 12.5 MG/HR: 25 INJECTION, SOLUTION INTRAVENOUS at 01:08

## 2023-02-28 RX ADMIN — NICARDIPINE HYDROCHLORIDE 12.5 MG/HR: 25 INJECTION, SOLUTION INTRAVENOUS at 08:38

## 2023-02-28 RX ADMIN — NICARDIPINE HYDROCHLORIDE 12.5 MG/HR: 25 INJECTION, SOLUTION INTRAVENOUS at 03:18

## 2023-02-28 RX ADMIN — ENALAPRILAT 1.25 MG: 1.25 INJECTION INTRAVENOUS at 07:25

## 2023-02-28 RX ADMIN — FUROSEMIDE 20 MG: 10 INJECTION, SOLUTION INTRAMUSCULAR; INTRAVENOUS at 16:37

## 2023-02-28 RX ADMIN — ASPIRIN 300 MG: 300 SUPPOSITORY RECTAL at 08:20

## 2023-02-28 RX ADMIN — ENALAPRILAT 1.25 MG: 1.25 INJECTION INTRAVENOUS at 23:22

## 2023-02-28 RX ADMIN — ENOXAPARIN SODIUM 30 MG: 100 INJECTION SUBCUTANEOUS at 22:22

## 2023-02-28 RX ADMIN — ENOXAPARIN SODIUM 30 MG: 100 INJECTION SUBCUTANEOUS at 08:21

## 2023-02-28 RX ADMIN — ATORVASTATIN CALCIUM 40 MG: 40 TABLET, FILM COATED ORAL at 22:22

## 2023-02-28 RX ADMIN — CLOPIDOGREL BISULFATE 150 MG: 75 TABLET ORAL at 15:44

## 2023-02-28 RX ADMIN — ENALAPRILAT 1.25 MG: 1.25 INJECTION INTRAVENOUS at 15:44

## 2023-02-28 RX ADMIN — SODIUM CHLORIDE, POTASSIUM CHLORIDE, SODIUM LACTATE AND CALCIUM CHLORIDE 100 ML/HR: 600; 310; 30; 20 INJECTION, SOLUTION INTRAVENOUS at 05:48

## 2023-02-28 NOTE — PROGRESS NOTES
OCCUPATIONAL THERAPY TREATMENT  Patient: Wendi Enriquez III [de-identified]81 y.o. male)  Date: 2/28/2023  Diagnosis: Stroke [facility]) [I63.9] <principal problem not specified>      Precautions: Fall (-140)  Chart, occupational therapy assessment, plan of care, and goals were reviewed. ASSESSMENT  Patient is progressing in OT goals and remains limited by moderate R hemiparesis, aphasia, decreased command following, and impaired balance. Patient was received initially lethargic but alerted enough to follow most simple commands with verbal + tactile cuing, despite keeping eyelids shut. Progressed to perform supine-sit EOB with maximum assistance x2 and maintained static sitting balance with only intermittent assist.  After several minutes, patient opened eyes fully and spontaneously and then continued to maintained eyes open through remainder of session. He attended well R side, practicing reaching in various planes, and initiated RUE reaching on R side. RUE with overall 3- to 3+/5 strength. Patient is LUE dominant at baseline per family, although somewhat ambidextrous. Continue to recommend inpatient rehab at d/c. Current Level of Function Impacting Discharge (ADLs): maximum to total assistance         PLAN :  Patient continues to benefit from skilled intervention to address the above impairments. Continue treatment per established plan of care to address goals. Recommendation for discharge: (in order for the patient to meet his/her long term goals)  Therapy 3 hours per day 5-7 days per week    This discharge recommendation:  Has been made in collaboration with the attending provider and/or case management         SUBJECTIVE:   Patient stated Jahaira Barajas.     OBJECTIVE DATA SUMMARY:   Cognitive/Behavioral Status:  Neurologic State: Lethargic;Eyes open spontaneously  Orientation Level: Unable to verbalize  Cognition: Decreased command following  Perception: Appears intact          Functional Mobility and Transfers for ADLs:  Bed Mobility:  Rolling: Moderate assistance;Maximum assistance (modA toward R, maxA toward L)  Supine to Sit: Maximum assistance;Assist x2  Sit to Supine: Maximum assistance;Assist x2  Scooting: Total assistance;Assist x2         Balance:  Sitting: Impaired  Sitting - Static: Fair (occasional)  Sitting - Dynamic: Poor (constant support)    ADL Intervention:       Grooming  Washing Face: Total assistance (dependent) (patient did not engage in task, appeared disinterested)         Pain:  Patient did not indicate pain    Activity Tolerance:   SBP elevated to 160s, RN notified    After treatment patient left in no apparent distress:   Supine in bed, Call bell within reach, and Caregiver / family present    COMMUNICATION/COLLABORATION:   The patients plan of care was discussed with: Physical therapist and Registered nurse. Patient was educated regarding His deficit(s) of R hemiparesis and aphasia as this relates to His diagnosis of CVA. He demonstrated Guarded understanding as evidenced by no response. Patient and/or family was verbally educated on the BE FAST acronym for signs/symptoms of CVA and TIA. BE FAST was written on patient's communication board  for visual education and reinforcement. All questions answered with patient indicating guarded understanding.      Deidra Castrejon OT  Time Calculation: 32 mins

## 2023-02-28 NOTE — PROGRESS NOTES
Problem: Dysphagia (Adult)  Goal: *Acute Goals and Plan of Care (Insert Text)  Description: Speech Therapy Goals  Initiated 2/27/23    1. Patient will tolerate baseline diet without adverse effects within 7 days. Outcome: Progressing Towards Goal   SPEECH LANGUAGE PATHOLOGY DYSPHAGIA TREATMENT  Patient: Karl Jaimes III [de-identified]81 y.o. male)  Date: 2/28/2023  Diagnosis: Stroke [facility]) [I63.9] <principal problem not specified>      Precautions:   Fall (-140)    ASSESSMENT:  Patient lethargic this date. He did rouse intermittently and state, \"yeah\". Minimal command following. He was able to suck via straw with increased effort. Functional oral clearance of thins and no anterior loss. Noted strong cough every few swallows, with numerous swallows per bolus. His mentation likely remains a significant contributing factor to his overt s/s of aspiration. Will continue to follow and re-assess pending mentation. If difficulty persists, once more awake, will likely benefit from further imaging of swallow. PLAN:  Recommendations and Planned Interventions:  NPO with ice chips for oral integrity and prevention of disuse atrophy  Oral care  Patient continues to benefit from skilled intervention to address the above impairments. Continue treatment per established plan of care. Discharge Recommendations: To Be Determined     SUBJECTIVE:   Patient stated Omie Stallion. OBJECTIVE:   Cognitive and Communication Status:  Neurologic State: Lethargic  Orientation Level: Unable to verbalize  Cognition: Decreased command following  Perception: Cues to attend to right side of body  Perseveration: Perseverates during conversation  Safety/Judgement: Lack of insight into deficits  Dysphagia Treatment:  Oral Assessment:  Oral Assessment  Oral Hygiene: very dry  Mandible: No impairment  P.O. Trials:  Patient Position: up in bed  Vocal quality prior to P.O.: No impairment  Consistency Presented:  Thin liquid  How Presented: SLP-fed/presented;Straw     Bolus Acceptance: Impaired  Bolus Formation/Control: No impairment     Propulsion: No impairment  Oral Residue: None        Aspiration Signs/Symptoms: Strong cough  Pharyngeal Phase Characteristics: Multiple swallows                                                                                                                                                      Pain:  Pain Scale 1: Adult Nonverbal Pain Scale  Pain Intensity 1: 0       After treatment:   Patient left in no apparent distress in bed, Call bell within reach, Nursing notified, and Caregiver / family present    COMMUNICATION/EDUCATION:   Patient was educated regarding purpose of SLP visit. He participated. The patient's plan of care including recommendations, planned interventions, and recommended diet changes were discussed with: Registered nurse.      KIMMIE Johnson  Time Calculation: 15 mins

## 2023-02-28 NOTE — PROGRESS NOTES
Critical care progress note. Slept with CPAP on. Awake this morning. Few dysarthric words but they seem to be minimally better than yesterday. Also, seems to nod consistently no to headache pain chest pain or dyspnea. Review of system could not be obtained. Medications in the hospital reviewed. On examination he is in no distress. Blood pressure still elevated above systolic 782 requiring as needed antihypertensives but overall better with the scheduled IV antihypertensives. No distress. Head examination revealed no conjunctival pallor. Chest was clear with good air entry. Heart sounds were faint S1 and S2. Lower extremities showed bilateral edema and so did the upper extremities bilaterally. Neurologically, orientation cannot be assessed. Motor power left upper and lower extremities 5/5, right upper and lower extremities weaker difficult to assess with his inconsistent following of commands but probably 2-3/5. Labs performed today were reviewed and showed hemoglobin 12.7 W BC 7.2 platelet count 860. Sodium 141 potassium 4.5 chloride 115 bicarbonate 20 BUN 21 creatinine 1.35 compared to 1.38 yesterday and 1.5 on February 26. Today, magnesium 2.1 albumin 2.5 and liver functions unremarkable. CT scan of the head performed today was reported as showing;  Evolving left middle cerebral artery infarct with hemorrhagic transformation. No  significant mass effect or midline shift. Assessment:  1. Ischemic stroke. Post mechanical thrombectomy. 2.  Residual significant dysarthria, some improvement. Right-sided weakness remains. 3.  Hypertension. Blood pressure still elevated, this is complicated by the fact that he cannot take pills orally, we are managing him with IV medications. 4.  Left internal carotid artery stenosis. Plans for stenting later during this admission. Plan:  1. Continue aspirin  2. Adjust IV medications until oral intake is possible.   3.  Follow-up in neurology and planning for intervention for the left ICA stenosis.

## 2023-02-28 NOTE — PROGRESS NOTES
Neurointerventional Surgery Progress Note  Neurocritical Care NP      Admit Date: 2/26/2023   LOS: 2 days      Daily Progress Note: 2/28/2023    Assessment:   Acute Left MCA Ischemic CVA due to left MCA occlusion with associated hemorrhagic conversion    Left ICA stenosis         Plan:   - continue rectal aspirin 300 mg FL daily for stroke prevention for now  - will need left ICA stenosis treated with angioplasty and stenting, Dr. Pita Rene would like to treat during this admission  - lipid panel shows .2, continue Lipitor 40 mg QHS  - Hgb A1C ok at 5.5  - ECHO pending   - PT/OT/SLP evals  - Neurology following     Activity: Bed rest  DVT ppx: SCDs, Lovenox  Dispo: TBD    Plan d/w Dr. Anayeli Chaparro RN.       HPI:Junito Isidro III is a 81 y.o. male with PMH significant for HTN, pre-diabetes, and high cholesterol who initially presented to [facility] 2/27/23 with right-sided weakness and aphasia. The wife reported that he was fine the night before. The next morning around 6:30 am she noticed that the TV was still on in the living room and found the patient on the floor with difficulty talking. 911 was called and the patient was brought in to the ED as a stroke alert. CT of Head showed a left MCA territory infarct and probable MCA thrombus. No intracranial hemorrhage. CTA of H/N showed occlusion of the distal left MCA segment and severe stenosis of left cervical ICA. Mild stenosis right cervical ICA. NIS consulted and the patient was deemed a candidate for mechanical thrombectomy. He was not a candidate for TNK due to LKW time. He is not taking any blood thinners at home. He was transferred to [facility] for neurointervention. He was emergently taken to the angio suite for thrombectomy. NIHSS 18 prior to procedure. He has no prior history of a stroke. Subjective:   Patient remains aphasic with right-sided weakness. No acute events overnight. Unable to obtain ROS due to aphasia.      Current Facility-Administered Medications   Medication Dose Route Frequency Provider Last Rate Last Admin    furosemide (LASIX) injection 20 mg  20 mg IntraVENous ONCE Vida Kaufman MD        metoprolol (LOPRESSOR) injection 5 mg  5 mg IntraVENous Q4H Vida Kaufman MD        atorvastatin (LIPITOR) tablet 40 mg  40 mg Oral QHS DWAYNE Lange        enalaprilat (VASOTEC) injection 1.25 mg  1.25 mg IntraVENous Q6H Carrie Kaufman MD   1.25 mg at 02/28/23 0725    sodium chloride (NS) flush 5-40 mL  5-40 mL IntraVENous Q8H Zackary Davis MD   10 mL at 02/28/23 0547    sodium chloride (NS) flush 5-40 mL  5-40 mL IntraVENous PRN Zackary Davis MD        acetaminophen (TYLENOL) tablet 650 mg  650 mg Oral Q6H PRN Zackary Davis MD        Or    acetaminophen (TYLENOL) suppository 650 mg  650 mg Rectal Q6H PRN Zackary Davis MD   650 mg at 02/27/23 0438    polyethylene glycol (MIRALAX) packet 17 g  17 g Oral DAILY PRN Zackary Davis MD        ondansetron (ZOFRAN ODT) tablet 4 mg  4 mg Oral Q8H PRN Zackary Davis MD        Or    ondansetron (ZOFRAN) injection 4 mg  4 mg IntraVENous Q6H PRN Zackary Davis MD        sodium chloride (NS) flush 5-40 mL  5-40 mL IntraVENous Q8H Tamie Mccarthy NP   10 mL at 02/28/23 0547    sodium chloride (NS) flush 5-40 mL  5-40 mL IntraVENous PRN Tamie Mccarthy NP        enoxaparin (LOVENOX) injection 30 mg  30 mg SubCUTAneous Q12H Zackary Davis MD   30 mg at 02/28/23 0821    niCARdipine (CARDENE) 25 mg in 0.9% sodium chloride 250 mL ([id number])  0-15 mg/hr IntraVENous TITRATE Tamie Mccarthy  mL/hr at 02/28/23 0838 12.5 mg/hr at 02/28/23 0838    hydrALAZINE (APRESOLINE) 20 mg/mL injection 20 mg  20 mg IntraVENous Q6H PRN Tamie Mccarthy NP   20 mg at 02/27/23 1656    aspirin (ASA) suppository 300 mg  300 mg Rectal DAILY Tamie Mccarthy NP   300 mg at 02/28/23 0820    labetaloL (NORMODYNE;TRANDATE) injection 20 mg  20 mg IntraVENous Q4H PRN Gui Pina, ARNP   20 mg at 23        No Known Allergies    Review of Systems:  Review of systems not obtained due to patient factors. Objective:     Vital signs  Temp (24hrs), Av.4 °F (37.4 °C), Min:98.3 °F (36.8 °C), Max:100.8 °F (38.2 °C)   No intake/output data recorded.  190 -  0700  In: 3801.5 [I.V.:6691.2]  Out: 5485 [Urine:1550]    Visit Vitals  BP (!) 149/57   Pulse 67   Temp 99.1 °F (37.3 °C)   Resp 14   Ht 5' 8\" (1.727 m)   Wt 148.6 kg (327 lb 9.7 oz)   SpO2 94%   BMI 49.81 kg/m²    O2 Flow Rate (L/min): 2 l/min O2 Device: Nasal cannula       Intake/Output Summary (Last 24 hours) at 2023 6579  Last data filed at 2023 0700  Gross per 24 hour   Intake 3850.82 ml   Output 650 ml   Net 3200.82 ml        Physical Exam:  GENERAL: drowsy, eyes open to voice   LUNG: expiratory wheeze left upper lobe  HEART: regular rate and rhythm, S1, S2 normal, no murmur, click, rub or gallop  EXTREMITIES:  extremities normal, atraumatic, no cyanosis, generalized peripheral edema, 1+ radial & pedal pulses present, posterior tibial pulses faint with palpation, but all distal foot pulses present with doppler. SKIN: Skin warm to touch. Appropriate for ethnicity. Right groin site clean, dry, and intact. No hematoma, bleeding, or bruising noted. Neurologic Exam:  Mental Status:             Drowsy, eyes open to voice. Language:                   Severe expressive aphasia and some receptive aphasia. Will mumble yes at times, but repeats it. Cranial Nerves:                                                 Pupils equal, round and reactive to light. No blink to threat in right eye. Blinks to threat in left eye. Right-sided visual neglect. EOMs intact                                       Mild right facial droop. Severe dysarthria.       Motor: Right-sided weakness present. RUE and RLE drift present. Moves LUE and LLE with no drift. Difficult to fully assess strength as patient is not following tasks consistently. No involuntary movements. Sensation:                   Withdraws to pain in all extremities. Reflexes:                     Deferred. Coordination & Gait:   FTN intact on left side. Unable to perform FTN and HTS on the right side due to weakness. Patient would not follow task with HTS on left leg. Gait deferred. NIHSS:      1a-LOC:1    1b-Month/Age:2    1c-Open/Close Hand:1    2-Best Gaze:0    3-Visual Fields:2    4-Facial Palsy:1    5a-Left Arm:0    5b-Right Arm:1    6a-Left Le    6b-Right Le    7-Limb Ataxia:0    8-Sensory:0    9-Best Language:2    10-Dysarthria:2    11-Extinction/Inattention:0  TOTAL SCORE:12    24 hour results:  Recent Results (from the past 12 hour(s))   CBC WITH AUTOMATED DIFF    Collection Time: 23  4:23 AM   Result Value Ref Range    WBC 7.2 4.1 - 11.1 K/uL    RBC 3.99 (L) 4.10 - 5.70 M/uL    HGB 12.7 12.1 - 17.0 g/dL    HCT 38.7 36.6 - 50.3 %    MCV 97.0 80.0 - 99.0 FL    MCH 31.8 26.0 - 34.0 PG    MCHC 32.8 30.0 - 36.5 g/dL    RDW 13.8 11.5 - 14.5 %    PLATELET 975 703 - 802 K/uL    MPV 9.3 8.9 - 12.9 FL    NRBC 0.0 0  WBC    ABSOLUTE NRBC 0.00 0.00 - 0.01 K/uL    NEUTROPHILS 83 (H) 32 - 75 %    LYMPHOCYTES 11 (L) 12 - 49 %    MONOCYTES 6 5 - 13 %    EOSINOPHILS 0 0 - 7 %    BASOPHILS 0 0 - 1 %    IMMATURE GRANULOCYTES 0 0.0 - 0.5 %    ABS. NEUTROPHILS 5.9 1.8 - 8.0 K/UL    ABS. LYMPHOCYTES 0.8 0.8 - 3.5 K/UL    ABS. MONOCYTES 0.4 0.0 - 1.0 K/UL    ABS. EOSINOPHILS 0.0 0.0 - 0.4 K/UL    ABS. BASOPHILS 0.0 0.0 - 0.1 K/UL    ABS. IMM.  GRANS. 0.0 0.00 - 0.04 K/UL    DF AUTOMATED     METABOLIC PANEL, COMPREHENSIVE    Collection Time: 23  4:23 AM   Result Value Ref Range    Sodium 141 136 - 145 mmol/L    Potassium 4.5 3.5 - 5.1 mmol/L Chloride 115 (H) 97 - 108 mmol/L    CO2 20 (L) 21 - 32 mmol/L    Anion gap 6 5 - 15 mmol/L    Glucose 130 (H) 65 - 100 mg/dL    BUN 21 (H) 6 - 20 MG/DL    Creatinine 1.35 (H) 0.70 - 1.30 MG/DL    BUN/Creatinine ratio 16 12 - 20      eGFR 56 (L) >60 ml/min/1.73m2    Calcium 8.2 (L) 8.5 - 10.1 MG/DL    Bilirubin, total 0.9 0.2 - 1.0 MG/DL    ALT (SGPT) 20 12 - 78 U/L    AST (SGOT) 30 15 - 37 U/L    Alk. phosphatase 61 45 - 117 U/L    Protein, total 5.6 (L) 6.4 - 8.2 g/dL    Albumin 2.5 (L) 3.5 - 5.0 g/dL    Globulin 3.1 2.0 - 4.0 g/dL    A-G Ratio 0.8 (L) 1.1 - 2.2     MAGNESIUM    Collection Time: 02/28/23  4:23 AM   Result Value Ref Range    Magnesium 2.1 1.6 - 2.4 mg/dL         Imaging:  CTH: CT evidence of left MCA territory infarct and probable MCA thrombus. No intracranial hemorrhage. CTA: Occlusion of distal M1 segment of left MCA. Left MCA territory infarction. Severe stenosis left cervical ICA. Mild stenosis right cervical ICA. MRI: Acute left MCA territory infarcts with associated hemorrhagic transformation. Bilateral carotid duplex 2/26/23: Consistent with less than 50% stenosis of the right internal carotid artery. Consistent with greater than 70% stenosis of the left internal carotid artery.       Elda Juárez, [address] [facility]  Neurocritical Care Nurse Practitioner

## 2023-02-28 NOTE — PROGRESS NOTES
Neurology Progress Note  Vasquez Mathias NP    Admit Date: 2023   LOS: 2 days      Daily Progress Note: 2023      HPI: HPI:Junito Isidro III is a 81 y.o. male with PMH significant for HTN, pre-diabetes, and high cholesterol who initially presented to [facility] 23 with right-sided weakness and aphasia. The wife reported that he was fine the night before. The next morning around 6:30 am she noticed that the TV was still on in the living room and found the patient on the floor with difficulty talking. 911 was called and the patient was brought in to the ED as a stroke alert. CT of Head showed a left MCA territory infarct and probable MCA thrombus. No intracranial hemorrhage. CTA of H/N showed occlusion of the distal left MCA segment and severe stenosis of left cervical ICA. Mild stenosis right cervical ICA. NIS consulted and the patient was deemed a candidate for mechanical thrombectomy. He was not a candidate for TNK due to LKW time. He is not taking any blood thinners at home. He was transferred to [facility] for neurointervention. He was emergently taken to the angio suite for thrombectomy. NIHSS 18 prior to procedure. He has no prior history of a stroke. Subjective:   No acute events overnight. Neuro exam improving with increased strength on the right. No Known Allergies    Review of Systems:  Pertinent items are noted in the History of Present Illness. Aphasia    No past medical history on file. No family history on file.   Social History     Tobacco Use    Smoking status: Not on file    Smokeless tobacco: Not on file   Substance Use Topics    Alcohol use: Not on file      None       Objective:   Vital signs  Temp (24hrs), Av.3 °F (37.4 °C), Min:98 °F (36.7 °C), Max:101 °F (38.3 °C)   1901 -  0700  In: 1125 [I.V.:1125]  Out: 0   701 -  1900  In: 6127.5 [I.V.:6127.5]  Out: 2650 [Urine:2450]  Visit Vitals  BP (!) 136/57 (BP 1 Location: Right upper arm, BP Patient Position: At rest)   Pulse 76   Temp (!) 100.8 °F (38.2 °C) Comment: blankets removed, room temp decreased   Resp 23   Ht 5' 8\" (1.727 m)   Wt 148.9 kg (328 lb 4.2 oz)   SpO2 93%   BMI 49.91 kg/m²    O2 Flow Rate (L/min): 2 l/min O2 Device: CPAP mask   Vitals:    02/27/23 2130 02/27/23 2200 02/27/23 2300 02/28/23 0000   BP: (!) 151/53 (!) 126/59 (!) 148/61 (!) 136/57   Pulse:  73 82 76   Resp:  26 21 23   Temp:    (!) 100.8 °F (38.2 °C)   SpO2:  93% 93% 93%   Weight:       Height:            Physical Exam:  GENERAL: Calm, cooperative, NAD  SKIN: Warm, dry, color appropriate for ethnicity. Neurologic Exam:  Mental Status:  Alert and oriented person, place. Appropriate affect, mood and behavior. Language:    Can say occasional words, mostly answers yes to everything. Can use stroke board and identify some items, can follow some simple commands. Cranial Nerves:   Pupils 3 mm, equal, round and reactive to light. Visual fields full to confrontation. Extraocular movements intact. Facial sensation intact. Right lower facial droop. Hearing grossly intact bilaterally. Mild dysarthria. Motor:    No pronator drift. Bulk and tone normal.      5/5 power in left  extremities proximally and distally. 3/5 power on the right extremities upper and lower. Now antigravity. No involuntary movements. Sensation:    Withdraws from noxious stimuli x 4. Reflexes:     Toes downgoing            Labs:  Lab Results   Component Value Date/Time    WBC 7.7 02/27/2023 05:40 AM    HGB 14.0 02/27/2023 05:40 AM    HCT 42.7 02/27/2023 05:40 AM    PLATELET 375 07/55/4738 05:40 AM    MCV 95.7 02/27/2023 05:40 AM     Lab Results   Component Value Date/Time    Sodium 141 02/27/2023 05:40 AM    Potassium 4.1 02/27/2023 05:40 AM    Chloride 114 (H) 02/27/2023 05:40 AM    CO2 20 (L) 02/27/2023 05:40 AM    Anion gap 7 02/27/2023 05:40 AM    Glucose 144 (H) 02/27/2023 05:40 AM    BUN 16 02/27/2023 05:40 AM    Creatinine 1.38 (H) 02/27/2023 05:40 AM    BUN/Creatinine ratio 12 02/27/2023 05:40 AM    Calcium 7.9 (L) 02/27/2023 05:40 AM     Imaging:  CT Results (maximum last 3): Results from [place] encounter on 02/26/23    CT HEAD WO CONT    Narrative  EXAM: CT HEAD WO CONT    INDICATION: Left MCA thrombus treated with thrombectomy. Left MCA CVA. COMPARISON: CT head and CT angiography head earlier today    TECHNIQUE: Noncontrast head CT. Coronal and sagittal reformats. CT dose  reduction was achieved through the use of a standardized protocol tailored for  this examination and automatic exposure control for dose modulation. FINDINGS: The ventricles and sulci are age-appropriate without hydrocephalus. There is no mass effect or midline shift. There is no intracranial hemorrhage or  extra-axial fluid collection. Hyperattenuation in the left caudate nucleus and  left putamen most likely represents contrast staining. There is contrast in the  vasculature. Decreased hypoattenuation in the left insula and left temporal  lobe. The calvarium is intact. The visualized paranasal sinuses and mastoid air cells  are clear. Impression  Decreased evidence of left MCA territory infarct. No intracranial hemorrhage. Post thrombectomy changes are expected. Results from Hospital Encounter encounter on 02/26/23    CTA CODE NEURO HEAD AND NECK W CONT    Narrative  *PRELIMINARY REPORT*    Thrombus in the distal left middle cerebral artery. Acute versus subacute  infarct in the left MCA territory involves left basal ganglia, left insula, left  frontal lobe, and left temporal lobe. Severe stenosis of the proximal left  internal carotid artery. Preliminary report was provided by Dr. Lennox Alicea, the on-call radiologist, at 1878  hours    Final report to follow. *END PRELIMINARY REPORT*    EXAM: CTA CODE NEURO HEAD AND NECK W CONT    INDICATION: Code Stroke    COMPARISON: None.     CONTRAST: 100 mL of Isovue-370. TECHNIQUE:  Unenhanced  images were obtained to localize the volume for  acquisition. Multislice helical axial CT angiography was performed from the  aortic arch to the top of the head during uneventful rapid bolus intravenous  contrast administration. Coronal and sagittal reformations and 3D post  processing was performed. CT dose reduction was achieved through use of a  standardized protocol tailored for this examination and automatic exposure  control for dose modulation. This study was analyzed by the [address] ai algorithm. FINDINGS:    DELAYED ENHANCEMENT HEAD CT:  No abnormal parenchymal enhancement. No mass effect or midline shift. Left MCA  infarction. CTA NECK:  Great vessels: Normal arch anatomy with the origins patent. Right subclavian artery: Patent  Left subclavian artery: Patent  Right common carotid artery: Patent  Left common carotid artery: Patent  Cervical right internal carotid artery: Patent with less than 50% stenosis by  NASCET criteria. Cervical left internal carotid artery: Patent with greater than 90% stenosis by  NASCET criteria. Right vertebral artery: Patent  Left vertebral artery: Patent  The lung apices are clear. The thyroid is homogeneous. No cervical  lymphadenopathy. CTA HEAD:  Right cavernous internal carotid artery: Patent  Left cavernous internal carotid artery: Patent  Anterior cerebral arteries: Patent  Anterior communicating artery: Patent  Right middle cerebral artery: Patent  Left middle cerebral artery: Occlusion of the distal left MCA at distal M1  segment. Small amount of thrombus in the M2 segment within sylvian fissure. Posterior communicating arteries: Patent  Posterior cerebral arteries: Patent  Basilar artery: Patent  Distal vertebral arteries: Patent  No evidence for intracranial aneurysm or hemodynamically significant stenosis. Impression  1. Occlusion of distal M1 segment of left MCA. 2.  Left MCA territory infarction.   3. Severe stenosis left cervical ICA. Mild stenosis right cervical ICA. CT CODE NEURO HEAD WO CONTRAST    Narrative  EXAM:  CT CODE NEURO HEAD WO CONTRAST    INDICATION: Right extremity weakness and difficulty speaking. Last known well at  11:45 PM last night. COMPARISON: None    TECHNIQUE: Noncontrast head CT. Coronal and sagittal reformats. CT dose  reduction was achieved through the use of a standardized protocol tailored for  this examination and automatic exposure control for dose modulation. FINDINGS: The ventricles and sulci are age-appropriate without hydrocephalus. There is no mass effect or midline shift. There is no intracranial hemorrhage or  extra-axial fluid collection. Hypoattenuation in the left temporal lobe and left  insula includes loss of gray-white differentiation. Dense left MCA sign. The calvarium is intact. The visualized paranasal sinuses and mastoid air cells  are clear. Impression  CT evidence of left MCA territory infarct and probable MCA thrombus. No  intracranial hemorrhage. I discussed this impression with NP Harjeet Rausch at 0840 hours on 2/26/2023. Message  posted on iLogon smiley. Assessment:   Active Problems:    Stroke [facility]) (2/26/2023)      Plan:   1.) Acute ischemic CVA, left MCA territory s/p mechanical thrombectomy with Dr. Georgiana Schirmer on 2/26 with TICI 2b reperfusion.              -Continue frequent neuro checks              - A1C okay at 5.5  -.2 goal <70. Lipitor 40 mg po q day             - MRI brain on 2/27/23 at 3:30 am with multiple acute left MCA territory infarcts with associated hemorrhagic transformation in left parietal and temporal lobes              - ECHO ordered              - PT/OT/SLP evals              - Stroke education              - SBP goal less than 140 s/p mechanical thrombectomy also with hemorrhagic conversion   -Repeat CTH pending this am    2.) Left ICA stenosis   -Rectal  mg MN q day. 70% stenosis of left ICA on carotid duplex. -Plan for stenting of left ICA with Dr. Carlos Manuel Corral in [facility] during this admission. -NIS following    Further recommendations to follow from Dr. Soco Handley. Mary Beth Nash, NP  Neurocritical Care Nurse Practitioner     Attending Attestation  I reviewed and agree with the history, exam findings, impression, and plan of care recorded in the advanced practitioners note and addended the note. The patient's case and plan of care was formulated under my guidance and I made edits as needed in the above note. We will finish work up as mentioned in the above note. Additional comments are noted in my below documentation. It is a great pleasure to see Galilea Raphael, a 81 y.o. male today in the hospital. Neuro images were reviewed. Neurological presentation consistent with  stroke and   left M1 occlusion and ICA stenosis, mechanical thrombectomy. hemorrhagic transformation in left parietal and temporal lobes - Antiplatelet/Anticoagulation per neurosurgery. There are multiple acute infarcts within the left MCA territory. Plan for stenting of left ICA with Dr. Carlos Manuel Corral.   Please continue the plan and management as mentioned in advanced practitioners note. I personally evaluated the patient and participated in key elements of management. I discussed problems, diagnosis and management with the advanced practitioner. As always, I greatly appreciate the opportunity to participate in the care of the patient.

## 2023-02-28 NOTE — PROGRESS NOTES
Problem: Mobility Impaired (Adult and Pediatric)  Goal: *Acute Goals and Plan of Care (Insert Text)  Description: FUNCTIONAL STATUS PRIOR TO ADMISSION: Patient was independent and active without use of DME. 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 Wears CPAP at night. History of prior concussions from horse racing. Retired. HOME SUPPORT PRIOR TO ADMISSION: The patient lived with spouse but did not require assist.    Physical Therapy Goals  Initiated 2/27/2023  1. Patient will move from supine to sit and sit to supine, scoot up and down, and roll side to side in bed with moderate assistance  within 7 day(s). 2.  Patient will transfer from bed to chair and chair to bed with maximal assistance using the least restrictive device within 7 day(s). 3.  Patient will perform sit to stand with moderate assistance within 7 day(s). 4.  Patient will ambulate with maximal assistance for 5 feet with the least restrictive device within 7 day(s). 5.  Patient will improve Santoro Balance score by 7 points within 7 days. Outcome: Progressing Towards Goal  PHYSICAL THERAPY TREATMENT  Patient: Royal Hubbard III [de-identified]81 y.o. male)  Date: 2/28/2023  Diagnosis: Stroke [facility]) [I63.9] <principal problem not specified>      Precautions: Fall (-140)  Chart, physical therapy assessment, plan of care and goals were reviewed. ASSESSMENT  Patient continues with skilled PT services and is progressing towards goals. Pt with decreased alertness, impaired balance, R hemiparesis, hypertension, perseveration (stating \"yeah, yeah, yeah\" to all questions and when not given questions), and overall decline in functional mobility compared to his independent baseline. Pt required max multimodal cuing and increased time in order to increase alertness and participate with therapy. Pt with R sided weakness but was able to move RUE/RLE on command. He rolled R with modA, rolled L with maxA, and transferred supine<>sit with maxA x2.   Demonstrated fair balance, with intermittent assistance to prevent anterior lean in an attempt to impulsively stand despite cuing to remain sitting. SBP 160s in sitting (goal <140) so further mobility deferred. Returned to bed, repositioned for comfort, and left with all needs met. Recommending IPR upon discharge. Current Level of Function Impacting Discharge (mobility/balance): maxA x2 for supine<>sit transfer     Other factors to consider for discharge: independent baseline, supportive family (hypervigilant), decreased alertness          PLAN :  Patient continues to benefit from skilled intervention to address the above impairments. Continue treatment per established plan of care. to address goals. Recommendation for discharge: (in order for the patient to meet his/her long term goals)  Therapy 3 hours per day 5-7 days per week    This discharge recommendation:  Has not yet been discussed the attending provider and/or case management    IF patient discharges home will need the following DME: to be determined (TBD)       SUBJECTIVE:   Patient stated Megha Ford.     OBJECTIVE DATA SUMMARY:   Critical Behavior:  Neurologic State: Lethargic  Orientation Level: Unable to verbalize  Cognition: Decreased command following  Safety/Judgement: Lack of insight into deficits  Functional Mobility Training:  Bed Mobility:  Rolling: Moderate assistance;Maximum assistance (modA toward R, maxA toward L)  Supine to Sit: Maximum assistance;Assist x2  Sit to Supine: Maximum assistance;Assist x2  Scooting:  Total assistance;Assist x2    Balance:  Sitting: Impaired  Sitting - Static: Fair (occasional)  Sitting - Dynamic: Poor (constant support)     Activity Tolerance:   Fair and Poor, BP above goal, drowsy     After treatment patient left in no apparent distress:   Supine in bed, Call bell within reach, Bed / chair alarm activated, Caregiver / family present, and Side rails x 3    COMMUNICATION/COLLABORATION:   The patients plan of care was discussed with: Occupational therapist and Registered nurse.      Meño Lott, PT, DPT   Time Calculation: 30 mins

## 2023-02-28 NOTE — PROCEDURES
PICC placement note:    PRE-PROCEDURE VERIFICATION  Correct Procedure: yes  Correct Site:  yes  Temperature: Temp: 99.1 °F (37.3 °C), Temperature Source: Temp Source: Axillary  Recent Labs     02/28/23  0423 02/27/23  0540 02/26/23  0758   BUN 21*   < > 25*   CREA 1.35*   < > 1.50*      < > 221   INR  --   --  1.0   WBC 7.2   < > 8.3    < > = values in this interval not displayed. Allergies: Patient has no known allergies. Education materials, including PICC Booklet, for PICC Care given to patient: yes. See Patient Education activity for further details. PROCEDURE DETAIL  PICC placed using Modified Seldinger Technique. A triple lumen PICC line was started for vascular access. The following documentation is in addition to the PICC properties in the lines/airways flowsheet :  Lot #: [id number]  Was xylocaine 1% used intradermally:  yes  Catheter Length: 42 (cm)  Vein Selection for PICC:left cephalic  Central Line Bundle followed yes  Complication Related to Insertion: none    The placement was verified by CXR technology: The  tip location is on the left side and the tip is in the distal superior vena cava. See CXR results for PICC tip placement. Report given to nurse Arthur. Line is okay to use.     Kelsea Kan RN

## 2023-02-28 NOTE — PROGRESS NOTES
Neurocritical Care Brief Progress Note:    Plan for diagnostic cerebral angiogram tomorrow. 150mg clopidogrel and 324mg asa, one-time doses ordered. Check P2Y12 and asa test tomorrow morning. Discussed w/ . [facility], Dr. Rickey Carroll, and primary RN.             Yimi Begum, [address] [facility]  Neurocritical Care Nurse Practitioner  [phone]

## 2023-02-28 NOTE — PROGRESS NOTES
Problem: Falls - Risk of  Goal: *Absence of Falls  Description: Document Jaime Torres Fall Risk and appropriate interventions in the flowsheet.   Outcome: Progressing Towards Goal  Note: Fall Risk Interventions:                                Problem: Patient Education: Go to Patient Education Activity  Goal: Patient/Family Education  Outcome: Progressing Towards Goal     Problem: Aspiration - Risk of  Goal: *Absence of aspiration  Outcome: Progressing Towards Goal     Problem: Patient Education: Go to Patient Education Activity  Goal: Patient/Family Education  Outcome: Progressing Towards Goal     Problem: Patient Education: Go to Patient Education Activity  Goal: Patient/Family Education  Outcome: Progressing Towards Goal     Problem: TIA/CVA Stroke: 0-24 hours  Goal: *Ability to perform ADLs and demonstrates progressive mobility and function  Outcome: Progressing Towards Goal     Problem: TIA/CVA Stroke: Day 2 Until Discharge  Goal: Activity/Safety  Outcome: Progressing Towards Goal  Goal: Diagnostic Test/Procedures  Outcome: Progressing Towards Goal  Goal: Nutrition/Diet  Outcome: Progressing Towards Goal  Goal: Discharge Planning  Outcome: Progressing Towards Goal  Goal: Medications  Outcome: Progressing Towards Goal  Goal: Respiratory  Outcome: Progressing Towards Goal  Goal: Treatments/Interventions/Procedures  Outcome: Progressing Towards Goal  Goal: Psychosocial  Outcome: Progressing Towards Goal  Goal: *Verbalizes anxiety and depression are reduced or absent  Outcome: Progressing Towards Goal  Goal: *Absence of aspiration  Outcome: Progressing Towards Goal  Goal: *Absence of deep venous thrombosis signs and symptoms(Stroke Metric)  Outcome: Progressing Towards Goal  Goal: *Optimal pain control at patient's stated goal  Outcome: Progressing Towards Goal  Goal: *Tolerating diet  Outcome: Progressing Towards Goal  Goal: *Ability to perform ADLs and demonstrates progressive mobility and function  Outcome: Progressing Towards Goal  Goal: *Stroke education continued(Stroke Metric)  Outcome: Progressing Towards Goal     Problem: Ischemic Stroke: Discharge Outcomes  Goal: *Verbalizes anxiety and depression are reduced or absent  Outcome: Progressing Towards Goal  Goal: *Verbalize understanding of risk factor modification(Stroke Metric)  Outcome: Progressing Towards Goal  Goal: *Hemodynamically stable  Outcome: Progressing Towards Goal  Goal: *Absence of aspiration pneumonia  Outcome: Progressing Towards Goal  Goal: *Aware of needed dietary changes  Outcome: Progressing Towards Goal  Goal: *Verbalize understanding of prescribed medications including anti-coagulants, anti-lipid, and/or anti-platelets(Stroke Metric)  Outcome: Progressing Towards Goal  Goal: *Tolerating diet  Outcome: Progressing Towards Goal  Goal: *Aware of follow-up diagnostics related to anticoagulants  Outcome: Progressing Towards Goal  Goal: *Ability to perform ADLs and demonstrates progressive mobility and function  Outcome: Progressing Towards Goal  Goal: *Absence of DVT(Stroke Metric)  Outcome: Progressing Towards Goal  Goal: *Absence of aspiration  Outcome: Progressing Towards Goal  Goal: *Optimal pain control at patient's stated goal  Outcome: Progressing Towards Goal  Goal: *Home safety concerns addressed  Outcome: Progressing Towards Goal  Goal: *Describes available resources and support systems  Outcome: Progressing Towards Goal  Goal: *Verbalizes understanding of activation of EMS(911) for stroke symptoms(Stroke Metric)  Outcome: Progressing Towards Goal  Goal: *Understands and describes signs and symptoms to report to providers(Stroke Metric)  Outcome: Progressing Towards Goal  Goal: *Neurolgocially stable (absence of additional neurological deficits)  Outcome: Progressing Towards Goal  Goal: *Verbalizes importance of follow-up with primary care physician(Stroke Metric)  Outcome: Progressing Towards Goal  Goal: *Depression screening completed(Stroke Metric)  Outcome: Progressing Towards Goal     Problem: Delirium Treatment  Goal: *Level of consciousness restored to baseline  Outcome: Progressing Towards Goal  Goal: *Level of environmental perceptions restored to baseline  Outcome: Progressing Towards Goal  Goal: *Sensory perception restored to baseline  Outcome: Progressing Towards Goal  Goal: *Emotional stability restored to baseline  Outcome: Progressing Towards Goal  Goal: *Functional assessment restored to baseline  Outcome: Progressing Towards Goal  Goal: *Absence of falls  Outcome: Progressing Towards Goal  Goal: *Will remain free of delirium, CAM Score negative  Outcome: Progressing Towards Goal  Goal: *Cognitive status will be restored to baseline  Outcome: Progressing Towards Goal  Goal: Interventions  Outcome: Progressing Towards Goal     Problem: Patient Education: Go to Patient Education Activity  Goal: Patient/Family Education  Outcome: Progressing Towards Goal     Problem: Patient Education: Go to Patient Education Activity  Goal: Patient/Family Education  Outcome: Progressing Towards Goal     Problem: Pressure Injury - Risk of  Goal: *Prevention of pressure injury  Description: Document Sony Scale and appropriate interventions in the flowsheet. Outcome: Progressing Towards Goal  Note: Pressure Injury Interventions:  Sensory Interventions: Assess changes in LOC, Assess need for specialty bed, Avoid rigorous massage over bony prominences, Float heels, Keep linens dry and wrinkle-free, Minimize linen layers, Maintain/enhance activity level, Pressure redistribution bed/mattress (bed type), Turn and reposition approx.  every two hours (pillows and wedges if needed)    Moisture Interventions: Absorbent underpads, Assess need for specialty bed, Check for incontinence Q2 hours and as needed, Internal/External urinary devices, Minimize layers    Activity Interventions: Assess need for specialty bed, Increase time out of bed, Pressure redistribution bed/mattress(bed type)    Mobility Interventions: Assess need for specialty bed, Float heels, HOB 30 degrees or less, Pressure redistribution bed/mattress (bed type), Turn and reposition approx.  every two hours(pillow and wedges)    Nutrition Interventions: Document food/fluid/supplement intake    Friction and Shear Interventions: Apply protective barrier, creams and emollients, Foam dressings/transparent film/skin sealants, HOB 30 degrees or less, Lift sheet, Minimize layers                Problem: Patient Education: Go to Patient Education Activity  Goal: Patient/Family Education  Outcome: Progressing Towards Goal

## 2023-02-28 NOTE — ROUTINE PROCESS
1930: Bedside shift change report given to Maurice Garrido RN (oncoming nurse) by Edilberto Gibson RN (offgoing nurse). Report included the following information SBAR, Kardex, ED Summary, Procedure Summary, Intake/Output, MAR, Recent Results, Cardiac Rhythm SR, and Dual Neuro Assessment. 0330Doreene Emms NP updated on pt's lack of UOP overnight. Orders received to perform bladder scan prior to end of shift. 0730: Bedside shift change report given to Laureano Elena RN (oncoming nurse) by Maurice Garrdio RN (offgoing nurse). Report included the following information SBAR, Kardex, ED Summary, Procedure Summary, Intake/Output, MAR, Recent Results, Cardiac Rhythm SR, and Quality Measures.

## 2023-03-01 LAB
ANION GAP SERPL CALC-SCNC: 9 MMOL/L (ref 5–15)
ASPIRIN TEST, ASPIRN: 401 ARU
BASOPHILS # BLD: 0 K/UL (ref 0–0.1)
BASOPHILS NFR BLD: 0 % (ref 0–1)
BUN SERPL-MCNC: 22 MG/DL (ref 6–20)
BUN/CREAT SERPL: 19 (ref 12–20)
CALCIUM SERPL-MCNC: 8.1 MG/DL (ref 8.5–10.1)
CHLORIDE SERPL-SCNC: 113 MMOL/L (ref 97–108)
CO2 SERPL-SCNC: 21 MMOL/L (ref 21–32)
CREAT SERPL-MCNC: 1.17 MG/DL (ref 0.7–1.3)
DIFFERENTIAL METHOD BLD: ABNORMAL
EOSINOPHIL # BLD: 0.2 K/UL (ref 0–0.4)
EOSINOPHIL NFR BLD: 2 % (ref 0–7)
ERYTHROCYTE [DISTWIDTH] IN BLOOD BY AUTOMATED COUNT: 13.7 % (ref 11.5–14.5)
GLUCOSE SERPL-MCNC: 101 MG/DL (ref 65–100)
HCT VFR BLD AUTO: 38.2 % (ref 36.6–50.3)
HGB BLD-MCNC: 12.6 G/DL (ref 12.1–17)
IMM GRANULOCYTES # BLD AUTO: 0 K/UL (ref 0–0.04)
IMM GRANULOCYTES NFR BLD AUTO: 0 % (ref 0–0.5)
LYMPHOCYTES # BLD: 1.1 K/UL (ref 0.8–3.5)
LYMPHOCYTES NFR BLD: 14 % (ref 12–49)
MAGNESIUM SERPL-MCNC: 2.3 MG/DL (ref 1.6–2.4)
MCH RBC QN AUTO: 32.1 PG (ref 26–34)
MCHC RBC AUTO-ENTMCNC: 33 G/DL (ref 30–36.5)
MCV RBC AUTO: 97.4 FL (ref 80–99)
MONOCYTES # BLD: 0.5 K/UL (ref 0–1)
MONOCYTES NFR BLD: 7 % (ref 5–13)
NEUTS SEG # BLD: 5.8 K/UL (ref 1.8–8)
NEUTS SEG NFR BLD: 77 % (ref 32–75)
NRBC # BLD: 0 K/UL (ref 0–0.01)
NRBC BLD-RTO: 0 PER 100 WBC
P2Y12 PLT RESPONSE,PPPR: 196 PRU (ref 194–418)
PHOSPHATE SERPL-MCNC: 2.7 MG/DL (ref 2.6–4.7)
PLATELET # BLD AUTO: 156 K/UL (ref 150–400)
PMV BLD AUTO: 9.2 FL (ref 8.9–12.9)
POTASSIUM SERPL-SCNC: 3.8 MMOL/L (ref 3.5–5.1)
RBC # BLD AUTO: 3.92 M/UL (ref 4.1–5.7)
SODIUM SERPL-SCNC: 143 MMOL/L (ref 136–145)
WBC # BLD AUTO: 7.6 K/UL (ref 4.1–11.1)

## 2023-03-01 PROCEDURE — 36592 COLLECT BLOOD FROM PICC: CPT

## 2023-03-01 PROCEDURE — 92526 ORAL FUNCTION THERAPY: CPT

## 2023-03-01 PROCEDURE — 97535 SELF CARE MNGMENT TRAINING: CPT

## 2023-03-01 PROCEDURE — 85576 BLOOD PLATELET AGGREGATION: CPT

## 2023-03-01 PROCEDURE — 80048 BASIC METABOLIC PNL TOTAL CA: CPT

## 2023-03-01 PROCEDURE — 74011250636 HC RX REV CODE- 250/636: Performed by: ANESTHESIOLOGY

## 2023-03-01 PROCEDURE — 97530 THERAPEUTIC ACTIVITIES: CPT

## 2023-03-01 PROCEDURE — 74011250637 HC RX REV CODE- 250/637

## 2023-03-01 PROCEDURE — 92507 TX SP LANG VOICE COMM INDIV: CPT

## 2023-03-01 PROCEDURE — 83735 ASSAY OF MAGNESIUM: CPT

## 2023-03-01 PROCEDURE — 97112 NEUROMUSCULAR REEDUCATION: CPT

## 2023-03-01 PROCEDURE — 74011250637 HC RX REV CODE- 250/637: Performed by: INTERNAL MEDICINE

## 2023-03-01 PROCEDURE — 94660 CPAP INITIATION&MGMT: CPT

## 2023-03-01 PROCEDURE — 74011250637 HC RX REV CODE- 250/637: Performed by: NURSE PRACTITIONER

## 2023-03-01 PROCEDURE — 74011250636 HC RX REV CODE- 250/636: Performed by: NURSE PRACTITIONER

## 2023-03-01 PROCEDURE — 74011000250 HC RX REV CODE- 250

## 2023-03-01 PROCEDURE — 74011000250 HC RX REV CODE- 250: Performed by: NURSE PRACTITIONER

## 2023-03-01 PROCEDURE — 85025 COMPLETE CBC W/AUTO DIFF WBC: CPT

## 2023-03-01 PROCEDURE — 36415 COLL VENOUS BLD VENIPUNCTURE: CPT

## 2023-03-01 PROCEDURE — 74011000250 HC RX REV CODE- 250: Performed by: ANESTHESIOLOGY

## 2023-03-01 PROCEDURE — 65610000006 HC RM INTENSIVE CARE

## 2023-03-01 PROCEDURE — 74011250636 HC RX REV CODE- 250/636: Performed by: INTERNAL MEDICINE

## 2023-03-01 PROCEDURE — 74011000250 HC RX REV CODE- 250: Performed by: INTERNAL MEDICINE

## 2023-03-01 PROCEDURE — 84100 ASSAY OF PHOSPHORUS: CPT

## 2023-03-01 RX ORDER — LISINOPRIL 20 MG/1
20 TABLET ORAL DAILY
Status: DISCONTINUED | OUTPATIENT
Start: 2023-03-01 | End: 2023-03-02

## 2023-03-01 RX ORDER — AMLODIPINE BESYLATE 5 MG/1
5 TABLET ORAL DAILY
COMMUNITY
End: 2023-03-07

## 2023-03-01 RX ORDER — HYDROCHLOROTHIAZIDE 25 MG/1
25 TABLET ORAL DAILY
COMMUNITY
End: 2023-03-07

## 2023-03-01 RX ORDER — GUAIFENESIN 100 MG/5ML
324 LIQUID (ML) ORAL DAILY
Status: DISCONTINUED | OUTPATIENT
Start: 2023-03-02 | End: 2023-03-07 | Stop reason: HOSPADM

## 2023-03-01 RX ORDER — CLOPIDOGREL BISULFATE 75 MG/1
75 TABLET ORAL DAILY
Status: DISCONTINUED | OUTPATIENT
Start: 2023-03-02 | End: 2023-03-07 | Stop reason: HOSPADM

## 2023-03-01 RX ORDER — LABETALOL 100 MG/1
100 TABLET, FILM COATED ORAL EVERY 12 HOURS
Status: DISCONTINUED | OUTPATIENT
Start: 2023-03-02 | End: 2023-03-02

## 2023-03-01 RX ORDER — NAPROXEN 500 MG/1
500 TABLET ORAL
COMMUNITY
End: 2023-03-07

## 2023-03-01 RX ORDER — LABETALOL HYDROCHLORIDE 5 MG/ML
20 INJECTION, SOLUTION INTRAVENOUS
Status: DISCONTINUED | OUTPATIENT
Start: 2023-03-02 | End: 2023-03-07 | Stop reason: HOSPADM

## 2023-03-01 RX ORDER — METOPROLOL TARTRATE 25 MG/1
25 TABLET, FILM COATED ORAL 2 TIMES DAILY
COMMUNITY
End: 2023-03-07

## 2023-03-01 RX ORDER — TRAMADOL HYDROCHLORIDE 50 MG/1
50 TABLET ORAL
COMMUNITY
End: 2023-03-07

## 2023-03-01 RX ORDER — ATORVASTATIN CALCIUM 20 MG/1
20 TABLET, FILM COATED ORAL
Status: ON HOLD | COMMUNITY
End: 2023-03-07 | Stop reason: SDUPTHER

## 2023-03-01 RX ADMIN — ALTEPLASE 1 MG: 2.2 INJECTION, POWDER, LYOPHILIZED, FOR SOLUTION INTRAVENOUS at 18:08

## 2023-03-01 RX ADMIN — ENOXAPARIN SODIUM 30 MG: 100 INJECTION SUBCUTANEOUS at 21:10

## 2023-03-01 RX ADMIN — SODIUM CHLORIDE, PRESERVATIVE FREE 10 ML: 5 INJECTION INTRAVENOUS at 21:13

## 2023-03-01 RX ADMIN — CLOPIDOGREL BISULFATE 75 MG: 75 TABLET ORAL at 06:18

## 2023-03-01 RX ADMIN — ASPIRIN 300 MG: 300 SUPPOSITORY RECTAL at 06:18

## 2023-03-01 RX ADMIN — SODIUM CHLORIDE, PRESERVATIVE FREE 10 ML: 5 INJECTION INTRAVENOUS at 15:14

## 2023-03-01 RX ADMIN — ATORVASTATIN CALCIUM 40 MG: 40 TABLET, FILM COATED ORAL at 21:12

## 2023-03-01 RX ADMIN — NICARDIPINE HYDROCHLORIDE 15 MG/HR: 25 INJECTION, SOLUTION INTRAVENOUS at 20:34

## 2023-03-01 RX ADMIN — METOPROLOL TARTRATE 5 MG: 5 INJECTION, SOLUTION INTRAVENOUS at 06:18

## 2023-03-01 RX ADMIN — METOPROLOL TARTRATE 5 MG: 5 INJECTION, SOLUTION INTRAVENOUS at 03:16

## 2023-03-01 RX ADMIN — SODIUM CHLORIDE, PRESERVATIVE FREE 10 ML: 5 INJECTION INTRAVENOUS at 21:12

## 2023-03-01 RX ADMIN — NICARDIPINE HYDROCHLORIDE 12.5 MG/HR: 25 INJECTION, SOLUTION INTRAVENOUS at 23:24

## 2023-03-01 RX ADMIN — NICARDIPINE HYDROCHLORIDE 5 MG/HR: 25 INJECTION, SOLUTION INTRAVENOUS at 11:56

## 2023-03-01 RX ADMIN — SODIUM CHLORIDE, PRESERVATIVE FREE 10 ML: 5 INJECTION INTRAVENOUS at 06:18

## 2023-03-01 RX ADMIN — HYDRALAZINE HYDROCHLORIDE 20 MG: 20 INJECTION INTRAMUSCULAR; INTRAVENOUS at 22:49

## 2023-03-01 RX ADMIN — LISINOPRIL 20 MG: 20 TABLET ORAL at 15:23

## 2023-03-01 RX ADMIN — NICARDIPINE HYDROCHLORIDE 10 MG/HR: 25 INJECTION, SOLUTION INTRAVENOUS at 16:11

## 2023-03-01 RX ADMIN — LABETALOL HYDROCHLORIDE 100 MG: 100 TABLET, FILM COATED ORAL at 23:23

## 2023-03-01 RX ADMIN — ENALAPRILAT 1.25 MG: 1.25 INJECTION INTRAVENOUS at 04:49

## 2023-03-01 RX ADMIN — ENALAPRILAT 1.25 MG: 1.25 INJECTION INTRAVENOUS at 09:46

## 2023-03-01 RX ADMIN — NICARDIPINE HYDROCHLORIDE 15 MG/HR: 25 INJECTION, SOLUTION INTRAVENOUS at 18:41

## 2023-03-01 RX ADMIN — LABETALOL HYDROCHLORIDE 20 MG: 5 INJECTION INTRAVENOUS at 21:08

## 2023-03-01 RX ADMIN — ENOXAPARIN SODIUM 30 MG: 100 INJECTION SUBCUTANEOUS at 08:50

## 2023-03-01 RX ADMIN — HYDRALAZINE HYDROCHLORIDE 20 MG: 20 INJECTION INTRAMUSCULAR; INTRAVENOUS at 08:49

## 2023-03-01 NOTE — PROGRESS NOTES
Neurology Progress Note  Casey Hyde NP    Admit Date: 2023   LOS: 2 days      Daily Progress Note: 2023      HPI: HPI:Junito Isidro III is a 81 y.o. male with PMH significant for HTN, pre-diabetes, and high cholesterol who initially presented to [facility] 23 with right-sided weakness and aphasia. The wife reported that he was fine the night before. The next morning around 6:30 am she noticed that the TV was still on in the living room and found the patient on the floor with difficulty talking. 911 was called and the patient was brought in to the ED as a stroke alert. CT of Head showed a left MCA territory infarct and probable MCA thrombus. No intracranial hemorrhage. CTA of H/N showed occlusion of the distal left MCA segment and severe stenosis of left cervical ICA. Mild stenosis right cervical ICA. NIS consulted and the patient was deemed a candidate for mechanical thrombectomy. He was not a candidate for TNK due to LKW time. He is not taking any blood thinners at home. He was transferred to [facility] for neurointervention. He was emergently taken to the angio suite for thrombectomy. NIHSS 18 prior to procedure. He has no prior history of a stroke. Subjective:   No acute events overnight. No Known Allergies    Review of Systems:  Pertinent items are noted in the History of Present Illness. Aphasia    No past medical history on file. No family history on file. Social History     Tobacco Use    Smoking status: Not on file    Smokeless tobacco: Not on file   Substance Use Topics    Alcohol use: Not on file      None       Objective:   Vital signs  Temp (24hrs), Av.5 °F (37.5 °C), Min:98 °F (36.7 °C), Max:100.8 °F (38.2 °C)   No intake/output data recorded.    0701 -  1900  In: 5598.3 [I.V.:5598.3]  Out: 1250 [Urine:1250]  Visit Vitals  BP (!) 128/59   Pulse (!) 56   Temp 98 °F (36.7 °C)   Resp 16   Ht 5' 8\" (1.727 m)   Wt 148.6 kg (327 lb 9.7 oz)   SpO2 95%   BMI 49.81 kg/m²    O2 Flow Rate (L/min): 2 l/min O2 Device: CPAP mask   Vitals:    02/28/23 1734 02/28/23 1800 02/28/23 1900 02/28/23 2033   BP: 113/75 (!) 126/55 (!) 128/59    Pulse: 74 (!) 58 (!) 56    Resp: 15 19 16    Temp:       SpO2: 94% 93% 93% 95%   Weight:       Height:            Physical Exam:  GENERAL: Calm, cooperative, NAD  SKIN: Warm, dry, color appropriate for ethnicity. Neurologic Exam:  Mental Status:  Alert and oriented person, place. Flat affect. Language:    Can say occasional words, mostly answers yes to everything. Can use stroke board and identify some items, can follow some simple commands. Cranial Nerves:   Pupils 3 mm, equal, round and reactive to light. Visual fields full to confrontation. Extraocular movements intact. Facial sensation intact. Right lower facial droop. Hearing grossly intact bilaterally. Mild dysarthria. Motor:    No pronator drift. Bulk and tone normal.      5/5 power in left  extremities proximally and distally. 3/5 power on the right extremities upper and lower. Now antigravity. No involuntary movements. Sensation:    Withdraws from noxious stimuli x 4. Reflexes: Toes downgoing            Labs:  Lab Results   Component Value Date/Time    WBC 7.2 02/28/2023 04:23 AM    HGB 12.7 02/28/2023 04:23 AM    HCT 38.7 02/28/2023 04:23 AM    PLATELET 841 82/83/4308 04:23 AM    MCV 97.0 02/28/2023 04:23 AM     Lab Results   Component Value Date/Time    Sodium 141 02/28/2023 04:23 AM    Potassium 4.5 02/28/2023 04:23 AM    Chloride 115 (H) 02/28/2023 04:23 AM    CO2 20 (L) 02/28/2023 04:23 AM    Anion gap 6 02/28/2023 04:23 AM    Glucose 130 (H) 02/28/2023 04:23 AM    BUN 21 (H) 02/28/2023 04:23 AM    Creatinine 1.35 (H) 02/28/2023 04:23 AM    BUN/Creatinine ratio 16 02/28/2023 04:23 AM    Calcium 8.2 (L) 02/28/2023 04:23 AM     Imaging:  CT Results (maximum last 3):   Results from [place] encounter on 02/26/23    CT HEAD WO CONT    Narrative  EXAM: CT HEAD WO CONT    INDICATION: Left MCA thrombus treated with thrombectomy. Left MCA CVA. COMPARISON: CT head and CT angiography head earlier today    TECHNIQUE: Noncontrast head CT. Coronal and sagittal reformats. CT dose  reduction was achieved through the use of a standardized protocol tailored for  this examination and automatic exposure control for dose modulation. FINDINGS: The ventricles and sulci are age-appropriate without hydrocephalus. There is no mass effect or midline shift. There is no intracranial hemorrhage or  extra-axial fluid collection. Hyperattenuation in the left caudate nucleus and  left putamen most likely represents contrast staining. There is contrast in the  vasculature. Decreased hypoattenuation in the left insula and left temporal  lobe. The calvarium is intact. The visualized paranasal sinuses and mastoid air cells  are clear. Impression  Decreased evidence of left MCA territory infarct. No intracranial hemorrhage. Post thrombectomy changes are expected. Results from Hospital Encounter encounter on 02/26/23    CTA CODE NEURO HEAD AND NECK W CONT    Narrative  *PRELIMINARY REPORT*    Thrombus in the distal left middle cerebral artery. Acute versus subacute  infarct in the left MCA territory involves left basal ganglia, left insula, left  frontal lobe, and left temporal lobe. Severe stenosis of the proximal left  internal carotid artery. Preliminary report was provided by Dr. Darnell Krishna, the on-call radiologist, at 0305  hours    Final report to follow. *END PRELIMINARY REPORT*    EXAM: CTA CODE NEURO HEAD AND NECK W CONT    INDICATION: Code Stroke    COMPARISON: None. CONTRAST: 100 mL of Isovue-370. TECHNIQUE:  Unenhanced  images were obtained to localize the volume for  acquisition.   Multislice helical axial CT angiography was performed from the  aortic arch to the top of the head during uneventful rapid bolus intravenous  contrast administration. Coronal and sagittal reformations and 3D post  processing was performed. CT dose reduction was achieved through use of a  standardized protocol tailored for this examination and automatic exposure  control for dose modulation. This study was analyzed by the [address] ai algorithm. FINDINGS:    DELAYED ENHANCEMENT HEAD CT:  No abnormal parenchymal enhancement. No mass effect or midline shift. Left MCA  infarction. CTA NECK:  Great vessels: Normal arch anatomy with the origins patent. Right subclavian artery: Patent  Left subclavian artery: Patent  Right common carotid artery: Patent  Left common carotid artery: Patent  Cervical right internal carotid artery: Patent with less than 50% stenosis by  NASCET criteria. Cervical left internal carotid artery: Patent with greater than 90% stenosis by  NASCET criteria. Right vertebral artery: Patent  Left vertebral artery: Patent  The lung apices are clear. The thyroid is homogeneous. No cervical  lymphadenopathy. CTA HEAD:  Right cavernous internal carotid artery: Patent  Left cavernous internal carotid artery: Patent  Anterior cerebral arteries: Patent  Anterior communicating artery: Patent  Right middle cerebral artery: Patent  Left middle cerebral artery: Occlusion of the distal left MCA at distal M1  segment. Small amount of thrombus in the M2 segment within sylvian fissure. Posterior communicating arteries: Patent  Posterior cerebral arteries: Patent  Basilar artery: Patent  Distal vertebral arteries: Patent  No evidence for intracranial aneurysm or hemodynamically significant stenosis. Impression  1. Occlusion of distal M1 segment of left MCA. 2.  Left MCA territory infarction. 3.  Severe stenosis left cervical ICA. Mild stenosis right cervical ICA. CT CODE NEURO HEAD WO CONTRAST    Narrative  EXAM:  CT CODE NEURO HEAD WO CONTRAST    INDICATION: Right extremity weakness and difficulty speaking.  Last known well at  11:45 PM last night. COMPARISON: None    TECHNIQUE: Noncontrast head CT. Coronal and sagittal reformats. CT dose  reduction was achieved through the use of a standardized protocol tailored for  this examination and automatic exposure control for dose modulation. FINDINGS: The ventricles and sulci are age-appropriate without hydrocephalus. There is no mass effect or midline shift. There is no intracranial hemorrhage or  extra-axial fluid collection. Hypoattenuation in the left temporal lobe and left  insula includes loss of gray-white differentiation. Dense left MCA sign. The calvarium is intact. The visualized paranasal sinuses and mastoid air cells  are clear. Impression  CT evidence of left MCA territory infarct and probable MCA thrombus. No  intracranial hemorrhage. I discussed this impression with NP Misty Nina at 0840 hours on 2/26/2023. Message  posted on [employer] smile[employer]. Assessment:   Active Problems:    Stroke [facility]) (2/26/2023)    Plan:   1.) Acute ischemic CVA, left MCA territory s/p mechanical thrombectomy with Dr. Lien Machado on 2/26 with TICI 2b reperfusion.              -Continue frequent neuro checks              - A1C okay at 5.5  -.2 goal <70. Lipitor 40 mg po q day   - MRI brain on 2/27/23 at 3:30 am with multiple acute left MCA territory infarcts with associated hemorrhagic transformation in left parietal and temporal lobes  -repeat [facility] 02/28/23 am with evolving left MCA infarct with hemorrhagic transformation. No significant mass effect or midline shift.    - ECHO with normal left ventricular systolic/diastolic function with EF 60-65%. No interatrial shunt visualized. Left atrium mildly dilated.               - PT/OT/SLP evals.  Did not pass swallow eval on 2/28/23   -Will need nutritional recommendations              - Stroke education  - SBP goal less than 140 s/p mechanical thrombectomy also with hemorrhagic conversion   - Neurology signing off, defer to NIS for further management    2.) Left ICA stenosis  -Rectal  mg OK q day and Plavix 75 mg per NG tube q day. 70% stenosis of left ICA on carotid duplex.    -Plan for stenting of left ICA with Dr. Brook Dennis in NIS during this admission. -NPO for diagnostic angiogram in the am  -Loaded with aspirin and Plavix 2/28 afternoon. Will get morning doses of aspirin and Plavix this am and check aspiri and P2Y12 at 10 am this am.     -NIS following    Further recommendations to follow from Dr. Jorgito Hahn. Yvonne Henson, NP  Neurocritical Care Nurse Practitioner       Attending Attestation  I reviewed and agree with the history, exam findings, impression, and plan of care recorded in the advanced practitioners note and addended the note. The patient's case and plan of care was formulated under my guidance and I made edits as needed in the above note. We will finish work up as mentioned in the above note. Additional comments are noted in my below documentation. It is a great pleasure to see Claudy Juárez, a 81 y.o. male today in the hospital. Neuro images were reviewed. Neurological presentation consistent with  stroke and   left M1 occlusion and ICA stenosis, mechanical thrombectomy. hemorrhagic transformation in left parietal and temporal lobes - Antiplatelet/Anticoagulation per neurosurgery. SBP goal less than 140. There are multiple acute infarcts within the left MCA territory. Plan for stenting of left ICA with Dr. Brook Dennis.   Please continue the plan and management as mentioned in advanced practitioners note. I personally evaluated the patient and participated in key elements of management. I discussed problems, diagnosis and management with the advanced practitioner. As always, I greatly appreciate the opportunity to participate in the care of the patient.

## 2023-03-01 NOTE — PROGRESS NOTES
Neurointerventional Surgery Progress Note  Neurocritical Care NP      Admit Date: 2/26/2023   LOS: 3 days      Daily Progress Note: 3/1/2023    Assessment:   Acute Left MCA Ischemic CVA due to left MCA occlusion with associated hemorrhagic conversion, s/p left MCA thrombectomy 2/26/23     Left ICA stenosis    Plan:      - Plans for LICA angioplasty/stenting tomorrow   - NPO at 0000 except meds   - Repeat CT head in am to re-assess small left basal ganglia bleed   - Cont DAPT with  mg AR (may change to oral once cleared by SLP) and Plavix 75 mg daily   - Check P2Y12 and aspirin assays today  - Lipid panel shows .2, continue Lipitor 40 mg QHS  - Neurology also following       Plan d/w Dr. Jose Castano and Dr Erin Garsia. HPI:Junito Isidro III is a 81 y.o. male with PMH significant for HTN, pre-diabetes, and high cholesterol who initially presented to [facility] 2/27/23 with right-sided weakness and aphasia. The wife reported that he was fine the night before. The next morning around 6:30 am she noticed that the TV was still on in the living room and found the patient on the floor with difficulty talking. 911 was called and the patient was brought in to the ED as a stroke alert. CT of Head showed a left MCA territory infarct and probable MCA thrombus. No intracranial hemorrhage. CTA of H/N showed occlusion of the distal left MCA segment and severe stenosis of left cervical ICA. Mild stenosis right cervical ICA. NIS consulted and the patient was deemed a candidate for mechanical thrombectomy. He was not a candidate for TNK due to LKW time. He is not taking any blood thinners at home. He was transferred to [facility] for neurointervention. He was emergently taken to the angio suite for thrombectomy. NIHSS 18 prior to procedure. He has no prior history of a stroke. Subjective:   Patient remains aphasic with right-sided weakness. No acute events overnight. Unable to obtain ROS due to aphasia.      Current Facility-Administered Medications   Medication Dose Route Frequency Provider Last Rate Last Admin    metoprolol (LOPRESSOR) injection 5 mg  5 mg IntraVENous Q4H Eleanor Kaufman MD   5 mg at 03/01/23 0618    bacitracin 500 unit/gram packet 1 Packet  1 Packet Topical PRN Eleanor Kaufman MD        alteplase (CATHFLO) 1 mg in sterile water (preservative free) 1 mL injection  1 mg InterCATHeter PRN Eleanor Kaufman MD        aspirin (ASA) suppository 300 mg  300 mg Rectal DAILY Lory Gabriel NP   300 mg at 03/01/23 9376    clopidogreL (PLAVIX) tablet 75 mg  75 mg Per NG tube DAILY Lory Rios NP   75 mg at 03/01/23 0618    atorvastatin (LIPITOR) tablet 40 mg  40 mg Oral QHS DWAYNE Schmidt   40 mg at 02/28/23 2222    enalaprilat (VASOTEC) injection 1.25 mg  1.25 mg IntraVENous Q6H Eleanor Kaufman MD   1.25 mg at 03/01/23 0946    sodium chloride (NS) flush 5-40 mL  5-40 mL IntraVENous Q8H Nidia Carolina MD   10 mL at 02/28/23 2227    sodium chloride (NS) flush 5-40 mL  5-40 mL IntraVENous PRN Nidia Carolina MD        acetaminophen (TYLENOL) tablet 650 mg  650 mg Oral Q6H PRN Nidia Carolina MD        Or    acetaminophen (TYLENOL) suppository 650 mg  650 mg Rectal Q6H PRN Nidia Carolina MD   650 mg at 02/27/23 0438    polyethylene glycol (MIRALAX) packet 17 g  17 g Oral DAILY PRN Nidia Carolina MD        ondansetron (ZOFRAN ODT) tablet 4 mg  4 mg Oral Q8H PRN Nidia Carolina MD        Or    ondansetron (ZOFRAN) injection 4 mg  4 mg IntraVENous Q6H PRALE Carolina MD        sodium chloride (NS) flush 5-40 mL  5-40 mL IntraVENous Q8H Chapis Mccarthy, NP   10 mL at 03/01/23 0618    sodium chloride (NS) flush 5-40 mL  5-40 mL IntraVENous PRN Chapis Mccarthy NP        enoxaparin (LOVENOX) injection 30 mg  30 mg SubCUTAneous Q12H Nidia Carolina MD   30 mg at 03/01/23 0850    niCARdipine (CARDENE) 25 mg in 0.9% sodium chloride 250 mL ([id number])  0-15 mg/hr IntraVENous TITRATE Miladis Mccarthy, NP 50 mL/hr at 23 0942 5 mg/hr at 23 0942    hydrALAZINE (APRESOLINE) 20 mg/mL injection 20 mg  20 mg IntraVENous Q6H PRN Miladis Mccarthy, NP   20 mg at 23 0849    labetaloL (NORMODYNE;TRANDATE) injection 20 mg  20 mg IntraVENous Q4H PRN Romayne ShirqueenieDWAYNE   20 mg at 23 2134        No Known Allergies    Review of Systems:  Review of systems not obtained due to patient factors. Objective:     Vital signs  Temp (24hrs), Av.4 °F (36.9 °C), Min:97.3 °F (36.3 °C), Max:99 °F (37.2 °C)   No intake/output data recorded.  1901 -  0700  In: 3185 [I.V.:3185]  Out: 1500 [Urine:1500]    Visit Vitals  BP (!) 137/97   Pulse 64   Temp 98.1 °F (36.7 °C)   Resp 24   Ht 5' 8\" (1.727 m)   Wt 329 lb 2.4 oz (149.3 kg)   SpO2 96%   BMI 50.05 kg/m²    O2 Flow Rate (L/min): 2 l/min O2 Device: CPAP mask       Intake/Output Summary (Last 24 hours) at 3/1/2023 1009  Last data filed at 3/1/2023 0800  Gross per 24 hour   Intake 1160 ml   Output 1400 ml   Net -240 ml        Physical Exam:  GENERAL: Calm, cooperative, adult  male  SKIN: Skin warm and dry to touch. Appropriate for ethnicity. Neurologic Exam:  Mental Status:             Alert, unable to assess orientation due to aphasia. Language:                   Severe expressive aphasia and some receptive aphasia. Will mumble yes at times, but repeats it, does not answer questions appropriately. Cannot show a thumbs up or 2 fingers, just raises hand. Cranial Nerves:                                                 Pupils 2 mm, equal, round and briskly reactive to light. No blink to threat in right eye. Blinks to threat in left eye. Right-sided visual neglect. EOMs intact, tracks examiner around room but will not follow to command                                      Mild right facial droop.       Motor: Right-sided weakness present. RUE and RLE drift present. Moves LUE and LLE with no drift. Difficult to fully assess strength as patient is not following tasks consistently. No involuntary movements. Sensation:                   ALEKSEY due to aphasia. Withdraws to pain in all extremities. Coordination & Gait:   Unable to follow commands well enough to perform FTN or HTS. Gait deferred. 24 hour results:  Recent Results (from the past 12 hour(s))   METABOLIC PANEL, BASIC    Collection Time: 03/01/23  3:24 AM   Result Value Ref Range    Sodium 143 136 - 145 mmol/L    Potassium 3.8 3.5 - 5.1 mmol/L    Chloride 113 (H) 97 - 108 mmol/L    CO2 21 21 - 32 mmol/L    Anion gap 9 5 - 15 mmol/L    Glucose 101 (H) 65 - 100 mg/dL    BUN 22 (H) 6 - 20 MG/DL    Creatinine 1.17 0.70 - 1.30 MG/DL    BUN/Creatinine ratio 19 12 - 20      eGFR >60 >60 ml/min/1.73m2    Calcium 8.1 (L) 8.5 - 10.1 MG/DL   MAGNESIUM    Collection Time: 03/01/23  3:24 AM   Result Value Ref Range    Magnesium 2.3 1.6 - 2.4 mg/dL   CBC WITH AUTOMATED DIFF    Collection Time: 03/01/23  3:24 AM   Result Value Ref Range    WBC 7.6 4.1 - 11.1 K/uL    RBC 3.92 (L) 4.10 - 5.70 M/uL    HGB 12.6 12.1 - 17.0 g/dL    HCT 38.2 36.6 - 50.3 %    MCV 97.4 80.0 - 99.0 FL    MCH 32.1 26.0 - 34.0 PG    MCHC 33.0 30.0 - 36.5 g/dL    RDW 13.7 11.5 - 14.5 %    PLATELET 506 152 - 953 K/uL    MPV 9.2 8.9 - 12.9 FL    NRBC 0.0 0  WBC    ABSOLUTE NRBC 0.00 0.00 - 0.01 K/uL    NEUTROPHILS 77 (H) 32 - 75 %    LYMPHOCYTES 14 12 - 49 %    MONOCYTES 7 5 - 13 %    EOSINOPHILS 2 0 - 7 %    BASOPHILS 0 0 - 1 %    IMMATURE GRANULOCYTES 0 0.0 - 0.5 %    ABS. NEUTROPHILS 5.8 1.8 - 8.0 K/UL    ABS. LYMPHOCYTES 1.1 0.8 - 3.5 K/UL    ABS. MONOCYTES 0.5 0.0 - 1.0 K/UL    ABS. EOSINOPHILS 0.2 0.0 - 0.4 K/UL    ABS. BASOPHILS 0.0 0.0 - 0.1 K/UL    ABS. IMM.  GRANS. 0.0 0.00 - 0.04 K/UL    DF AUTOMATED     PHOSPHORUS    Collection Time: 03/01/23  3:24 AM   Result Value Ref Range    Phosphorus 2.7 2.6 - 4.7 MG/DL     Imaging:  CTH: CT evidence of left MCA territory infarct and probable MCA thrombus. No intracranial hemorrhage. CTA: Occlusion of distal M1 segment of left MCA. Left MCA territory infarction. Severe stenosis left cervical ICA. Mild stenosis right cervical ICA. MRI: Acute left MCA territory infarcts with associated hemorrhagic transformation. Bilateral carotid duplex 2/26/23: Consistent with less than 50% stenosis of the right internal carotid artery. Consistent with greater than 70% stenosis of the left internal carotid artery.     Senait Tai NP  Neurocritical Care Nurse Practitioner

## 2023-03-01 NOTE — PROGRESS NOTES
Problem: Mobility Impaired (Adult and Pediatric)  Goal: *Acute Goals and Plan of Care (Insert Text)  Description: FUNCTIONAL STATUS PRIOR TO ADMISSION: Patient was independent and active without use of DME. 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 Wears CPAP at night. History of prior concussions from horse racing. Retired. HOME SUPPORT PRIOR TO ADMISSION: The patient lived with spouse but did not require assist.    Physical Therapy Goals  Initiated 2/27/2023  1. Patient will move from supine to sit and sit to supine, scoot up and down, and roll side to side in bed with moderate assistance  within 7 day(s). 2.  Patient will transfer from bed to chair and chair to bed with maximal assistance using the least restrictive device within 7 day(s). 3.  Patient will perform sit to stand with moderate assistance within 7 day(s). 4.  Patient will ambulate with maximal assistance for 5 feet with the least restrictive device within 7 day(s). 5.  Patient will improve Santoro Balance score by 7 points within 7 days. Outcome: Progressing Towards Goal     PHYSICAL THERAPY TREATMENT  Patient: Rae  III [de-identified]81 y.o. male)  Date: 3/1/2023  Diagnosis: Stroke [facility]) [I63.9] <principal problem not specified>      Precautions: Fall (-140)  Chart, physical therapy assessment, plan of care and goals were reviewed. ASSESSMENT  Patient continues with skilled PT services and is progressing towards goals however remains most limited by R sided weakness (improving), decreased command following (improving), expressive aphasia, impulsivity, impaired balance, and impaired gait leading to increased falls risk and dependency from baseline level. Demos significant improved alertness and overall command following/participation this session compared to previous notes. Able to follow some simple one step verbal only commands. Improved activation of and attention to the R.  Facilitated supine>sit with min A x2 (with HOB elevated) and increased time/effort. Demos good sitting balance once up; no major LOB with minimal dynamic challenges without support. Completed multiple sit<>stands with cues and light NM facilitation to R UE/LE through transitional movement. Progressed to taking several small shuffled steps to the chair (towards the LEFT) with mod A x1. Remained up in chair and set up for lunch at end of session, NAD. Continue to recommend discharge to IPR once medically cleared. Will follow. Current Level of Function Impacting Discharge (mobility/balance): min/mod A; improved command following; improved R weakness; below baseline     Other factors to consider for discharge: high falls risk; aphasia; R HP         PLAN :  Patient continues to benefit from skilled intervention to address the above impairments. Continue treatment per established plan of care. to address goals. Recommendation for discharge: (in order for the patient to meet his/her long term goals)  Therapy 3 hours per day 5-7 days per week    This discharge recommendation:  Has been made in collaboration with the attending provider and/or case management    IF patient discharges home will need the following DME: to be determined (TBD)       SUBJECTIVE:   Patient stated Wolfgang Fruit.  (still inconsistent with yes/no)    OBJECTIVE DATA SUMMARY:   Critical Behavior:  Neurologic State: Alert  Orientation Level: Unable to verbalize  Cognition:  (follows some commands but not all)  Safety/Judgement: Lack of insight into deficits  Functional Mobility Training:  Bed Mobility:     Supine to Sit: Assist x2;Minimum assistance  Sit to Supine: Assist x2;Minimum assistance  Scooting: Minimum assistance        Transfers:  Sit to Stand: Assist x2;Minimum assistance  Stand to Sit: Assist x2;Minimum assistance      Bed to Chair: Moderate assistance;Assist x1    Balance:  Sitting: Impaired; Without support  Sitting - Static: Good (unsupported)  Sitting - Dynamic: Good (unsupported)  Standing: With support; Impaired  Standing - Static: Fair;Constant support  Standing - Dynamic : Fair;Constant support    Activity Tolerance:   Good    After treatment patient left in no apparent distress:   Sitting in chair, Call bell within reach, Bed / chair alarm activated, and Caregiver / family present    COMMUNICATION/COLLABORATION:   The patients plan of care was discussed with: Occupational therapist and Registered nurse. Patient was educated regarding His deficit(s) of R weakness and inattention as this relates to His diagnosis of CVA. He demonstrated Fair understanding as evidenced by nodding. Patient and/or family was verbally educated on the BE FAST acronym for signs/symptoms of CVA and TIA. BE FAST was written on patient's communication board  for visual education and reinforcement. All questions answered with patient indicating good understanding.      Francoise Gross, PT, DPT   Time Calculation: 18 mins

## 2023-03-01 NOTE — ROUTINE PROCESS
1930: Bedside shift change report given to Aileen Martinez RN (oncoming nurse) by Shannan Ritchie RN (offgoing nurse). Report included the following information SBAR, Kardex, ED Summary, Procedure Summary, Intake/Output, MAR, Recent Results, Cardiac Rhythm SR, and Dual Neuro Assessment. 0730: Bedside shift change report given to [facility] MICHELLE PULIDO (oncoming nurse) by Aileen Martinez RN (offgoing nurse). Report included the following information SBAR, Kardex, ED Summary, Procedure Summary, Intake/Output, MAR, Recent Results, Cardiac Rhythm SB, and Dual Neuro Assessment.

## 2023-03-01 NOTE — PROGRESS NOTES
Problem: Self Care Deficits Care Plan (Adult)  Goal: *Acute Goals and Plan of Care (Insert Text)  Description: FUNCTIONAL STATUS PRIOR TO ADMISSION: Patient was independent and active without use of DME. Patient was independent for basic and instrumental ADLs. Patient assists his wife at home typically. HOME SUPPORT: The patient lived with his wife but did not require assist.    Occupational Therapy Goals  Initiated 2/27/2023   1. Patient will perform grooming in sitting with moderate assistance  within 7 day(s). 2.  Patient will perform lower body dressing with maximal assistance within 7 day(s). 3.  Patient will perform anterior bathing from neck to thighs with minimal assistance/contact guard assist within 7 day(s). 4.  Patient will perform toilet transfers to [facility] with maximal assistance within 7 day(s). 5.  Patient will perform all aspects of toileting with moderate assistance  within 7 day(s). 6.  Patient will participate in upper extremity therapeutic exercise/activities with minimal assistance/contact guard assist within 7 day(s). 7.  Patient will utilize energy conservation techniques during functional activities with verbal and visual cues within 7 day(s). 8.  Patient will participate in [facility] assessment in prep for ADLs within 7 days. Outcome: Progressing Towards Goal     OCCUPATIONAL THERAPY TREATMENT  Patient: Loren Noel III [de-identified]81 y.o. male)  Date: 3/1/2023  Diagnosis: Stroke [facility]) [I63.9] <principal problem not specified>      Precautions: Fall (-140)  Chart, occupational therapy assessment, plan of care, and goals were reviewed. ASSESSMENT  Patient is progressing very well in OT goals. Patient was received alert today and participated promptly, maintaining alertness throughout session.   Remains limited by mild visual inattention (improving, able to attend to R stimuli with only minimal cuing), mild-moderate R hemiparesis (improving), impaired balance, aphasia, decreased command following (improving), mild impulsivity with mobility tasks (very eager), impaired coordination, and impaired functional reach for ADLs. Patient significantly progressed mobility today, requiring only minimum to moderate assistance x1 for stand-pivot OOB to chair. He was also able to integrate affected RUE for gross grasp in ADL tasks with minimal cuing, although somewhat limited by weakness (3+/5 ). Continue to recommend inpatient rehab at d/c. Current Level of Function Impacting Discharge (ADLs): minimum to maximum assistance UB ADLs, maximum to total assistance LB ALDs. PLAN :  Patient continues to benefit from skilled intervention to address the above impairments. Continue treatment per established plan of care to address goals. Recommendation for discharge: (in order for the patient to meet his/her long term goals)  Therapy 3 hours per day 5-7 days per week    This discharge recommendation:  Has been made in collaboration with the attending provider and/or case management         SUBJECTIVE:   Patient stated Jael .     OBJECTIVE DATA SUMMARY:   Cognitive/Behavioral Status:  Neurologic State: Alert  Orientation Level: Unable to verbalize  Cognition:  (follows some commands but not all)             Functional Mobility and Transfers for ADLs:  Bed Mobility:  Supine to Sit: Assist x2;Minimum assistance  Sit to Supine: Assist x2;Minimum assistance  Scooting: Minimum assistance    Transfers:  Sit to Stand: Assist x2;Minimum assistance     Bed to Chair: Moderate assistance;Assist x1    Balance:  Sitting: Impaired; Without support  Sitting - Static: Good (unsupported)  Sitting - Dynamic: Good (unsupported)  Standing: With support; Impaired  Standing - Static: Fair;Constant support  Standing - Dynamic : Fair;Constant support    ADL Intervention:  Feeding  Container Management: Minimum assistance (able to grasp containers lightly with R hand while manipulating with L hand, ex to remove lid from applesauce cup)  Food to Mouth: Set-up (using unaffected RLE)  Drink to Mouth: Minimum assistance (able to grasp cup and raise to mouth using L hand, help to pinch straw per SLP recommendation)                   Lower Body Bathing  Bathing Assistance: Maximum assistance (wiped anterior L leg seated, was able to lightly grasp wipe with R hand to partially wipe anterior R leg + help. Required assistance to wipe posterior BLE and perineal in standing with steadying)        Pain:  Patient did not indicate pain    Activity Tolerance:   Good    After treatment patient left in no apparent distress:   Sitting in chair, Call bell within reach, Bed / chair alarm activated, and Caregiver / family present    COMMUNICATION/COLLABORATION:   The patients plan of care was discussed with: Physical therapist and Registered nurse. Patient was educated regarding His deficit(s) of aphasia and R hemiparesis as this relates to His diagnosis of CVA. He demonstrated Guarded understanding as evidenced by limited response. Patient and/or family was verbally educated on the BE FAST acronym for signs/symptoms of CVA and TIA. BE FAST was written on patient's communication board  for visual education and reinforcement. All questions answered with patient indicating guarded understanding.      Hodan Velazquez OT  Time Calculation: 25 mins

## 2023-03-01 NOTE — PROGRESS NOTES
SPEECH LANGUAGE PATHOLOGY DYSPHAGIA AND SPEECH TREATMENT  Patient: Amanda Salamanca III (71 y.o. male)  Date: 3/1/2023  Diagnosis: Stroke [facility]) [I63.9] <principal problem not specified>      Precautions:  Fall (-140)    ASSESSMENT:  Patient with significant improvement in mentation on this date with excellent participation in PO trials. Pt only with overt s/s of aspiration with sequential sips of liquids, however, this subsided when SLP pinched straw. Low suspicion for silent aspiration at this time given location of infarct. Therefore, recommend pt initiate diet as outlined below. Suspect swallow function to mirror mentation. Furthermore, pt continues to present with severe expressive and moderate receptive aphasia. Expressive aphasia characterized by perseveration on \"yeah\" with inability to produce any other words. Pt's daughter at bedside stated that pt does get some other words out at times. Pt also wrote his name with paraphasias multiple times on a piece of paper. Spent time discussing with daughter, who is a nurse, about further information about expressive-receptive aphasia and some things that may or may not be helpful. Pt's daughter expressed understanding. PLAN:  Recommendations and Planned Interventions:  --soft/bite sized diet/thin liquids  --good oral care  --small, single sips (pinch straw)  --general aspiration precautions    Recommend utilizing the following strategies to facilitate patient's functional communication, expressive language, receptive language, and command following   -- Utilize simple Yes/No questions to obtain information  -- Provide patient with visual cues   -- Allow patient extra time to process and respond  -- Would not utilize a communication board at this time as it is likely too complex to be accurate    Patient continues to benefit from skilled intervention to address the above impairments. Continue treatment per established plan of care.   Discharge Recommendations:  Inpatient Rehab     SUBJECTIVE:   Patient stated, \"Yeah. Yeah yeah. .    OBJECTIVE:   Cognitive and Communication Status:  Neurologic State: Alert  Orientation Level: Unable to verbalize  Cognition:  (follows some commands but not all)  Perception: Appears intact  Perseveration: Perseverates during conversation  Safety/Judgement: Lack of insight into deficits    Dysphagia Treatment and Interventions:  Oral Assessment:  Oral Assessment  Labial: No impairment  Dentition: Natural  Oral Hygiene: oral mucosa dry  Lingual: No impairment  Velum: No impairment  Mandible: No impairment  P.O. Trials:  Patient Position: upright in bed  Vocal quality prior to P.O.: No impairment  Consistency Presented: Thin liquid;Puree; Solid        Bolus Acceptance: No impairment  Bolus Formation/Control: Impaired  Type of Impairment: Delayed;Mastication  Propulsion: Delayed (# of seconds)  Oral Residue: None  Initiation of Swallow: No impairment  Laryngeal Elevation: Functional  Aspiration Signs/Symptoms:  (cough with sequential sips; subsided when pitching straw)  Pharyngeal Phase Characteristics: No impairment, issues, or problems       Speech Treatment and Interventions: Motor Speech:    Speech Characteristics: Perseveration (on \"yeah\")           Language Comprehension and Expression:  Auditory Comprehension   Auditory Impairment: Yes  Response to Basic Yes/No Questions (%): 30 %  One-Step Basic Commands (%): 30 % (pt being given a lot of cueing from family)  Verbal Expression  Verbal Expression  Initiation: Impaired (%)  Automatic Speech Task: Impaired (comment)  Repetition: Impaired  Conversation: Non-fluent  Speech Characteristics: Perseveration (on \"yeah\")    Patient writing his name on a piece of paper with paraphasias noted.      Pain:  Pain Scale 1: Adult Nonverbal Pain Scale  Pain Intensity 1: 0       After treatment:   Call bell within reach and Nursing notified    COMMUNICATION/EDUCATION:     The patient's plan of care including recommendations, planned interventions, and recommended diet changes were discussed with: Registered nurse. Jared Blanc SLP  Time Calculation: 20 mins      Problem: Dysphagia (Adult)  Goal: *Acute Goals and Plan of Care (Insert Text)  Description: Speech Therapy Goals  Initiated 2/27/23    1. Patient will tolerate baseline diet without adverse effects within 7 days. Outcome: Progressing Towards Goal     Problem: Communication Impaired (Adult)  Goal: *Acute Goals and Plan of Care (Insert Text)  Description: Speech Therapy Goals  Initiated 2/27/23    1. Patient will participate in further diagnostic treatment regarding language.    Outcome: Progressing Towards Goal

## 2023-03-01 NOTE — PROGRESS NOTES
Problem: Falls - Risk of  Goal: *Absence of Falls  Description: Document [place] Fall Risk and appropriate interventions in the flowsheet.   Outcome: Progressing Towards Goal  Note: Fall Risk Interventions:                                Problem: Patient Education: Go to Patient Education Activity  Goal: Patient/Family Education  Outcome: Progressing Towards Goal     Problem: Aspiration - Risk of  Goal: *Absence of aspiration  Outcome: Progressing Towards Goal     Problem: Patient Education: Go to Patient Education Activity  Goal: Patient/Family Education  Outcome: Progressing Towards Goal     Problem: TIA/CVA Stroke: Day 2 Until Discharge  Goal: Activity/Safety  Outcome: Progressing Towards Goal  Goal: Diagnostic Test/Procedures  Outcome: Progressing Towards Goal  Goal: Nutrition/Diet  Outcome: Progressing Towards Goal  Goal: Discharge Planning  Outcome: Progressing Towards Goal  Goal: Medications  Outcome: Progressing Towards Goal  Goal: Respiratory  Outcome: Progressing Towards Goal  Goal: Treatments/Interventions/Procedures  Outcome: Progressing Towards Goal  Goal: Psychosocial  Outcome: Progressing Towards Goal  Goal: *Verbalizes anxiety and depression are reduced or absent  Outcome: Progressing Towards Goal  Goal: *Absence of aspiration  Outcome: Progressing Towards Goal  Goal: *Absence of deep venous thrombosis signs and symptoms(Stroke Metric)  Outcome: Progressing Towards Goal  Goal: *Optimal pain control at patient's stated goal  Outcome: Progressing Towards Goal  Goal: *Tolerating diet  Outcome: Progressing Towards Goal  Goal: *Ability to perform ADLs and demonstrates progressive mobility and function  Outcome: Progressing Towards Goal  Goal: *Stroke education continued(Stroke Metric)  Outcome: Progressing Towards Goal     Problem: Ischemic Stroke: Discharge Outcomes  Goal: *Verbalizes anxiety and depression are reduced or absent  Outcome: Progressing Towards Goal  Goal: *Verbalize understanding of risk factor modification(Stroke Metric)  Outcome: Progressing Towards Goal  Goal: *Hemodynamically stable  Outcome: Progressing Towards Goal  Goal: *Absence of aspiration pneumonia  Outcome: Progressing Towards Goal  Goal: *Aware of needed dietary changes  Outcome: Progressing Towards Goal  Goal: *Verbalize understanding of prescribed medications including anti-coagulants, anti-lipid, and/or anti-platelets(Stroke Metric)  Outcome: Progressing Towards Goal  Goal: *Tolerating diet  Outcome: Progressing Towards Goal  Goal: *Aware of follow-up diagnostics related to anticoagulants  Outcome: Progressing Towards Goal  Goal: *Ability to perform ADLs and demonstrates progressive mobility and function  Outcome: Progressing Towards Goal  Goal: *Absence of DVT(Stroke Metric)  Outcome: Progressing Towards Goal  Goal: *Absence of aspiration  Outcome: Progressing Towards Goal  Goal: *Optimal pain control at patient's stated goal  Outcome: Progressing Towards Goal  Goal: *Home safety concerns addressed  Outcome: Progressing Towards Goal  Goal: *Describes available resources and support systems  Outcome: Progressing Towards Goal  Goal: *Verbalizes understanding of activation of EMS(911) for stroke symptoms(Stroke Metric)  Outcome: Progressing Towards Goal  Goal: *Understands and describes signs and symptoms to report to providers(Stroke Metric)  Outcome: Progressing Towards Goal  Goal: *Neurolgocially stable (absence of additional neurological deficits)  Outcome: Progressing Towards Goal  Goal: *Verbalizes importance of follow-up with primary care physician(Stroke Metric)  Outcome: Progressing Towards Goal  Goal: *Smoking cessation discussed,if applicable(Stroke Metric)  Outcome: Progressing Towards Goal  Goal: *Depression screening completed(Stroke Metric)  Outcome: Progressing Towards Goal     Problem: Delirium Treatment  Goal: *Level of consciousness restored to baseline  Outcome: Progressing Towards Goal  Goal: *Level of environmental perceptions restored to baseline  Outcome: Progressing Towards Goal  Goal: *Sensory perception restored to baseline  Outcome: Progressing Towards Goal  Goal: *Emotional stability restored to baseline  Outcome: Progressing Towards Goal  Goal: *Functional assessment restored to baseline  Outcome: Progressing Towards Goal  Goal: *Absence of falls  Outcome: Progressing Towards Goal  Goal: *Will remain free of delirium, CAM Score negative  Outcome: Progressing Towards Goal  Goal: *Cognitive status will be restored to baseline  Outcome: Progressing Towards Goal  Goal: Interventions  Outcome: Progressing Towards Goal     Problem: Patient Education: Go to Patient Education Activity  Goal: Patient/Family Education  Outcome: Progressing Towards Goal     Problem: Patient Education: Go to Patient Education Activity  Goal: Patient/Family Education  Outcome: Progressing Towards Goal     Problem: Patient Education: Go to Patient Education Activity  Goal: Patient/Family Education  Outcome: Progressing Towards Goal     Problem: Patient Education: Go to Patient Education Activity  Goal: Patient/Family Education  Outcome: Progressing Towards Goal     Problem: Patient Education: Go to Patient Education Activity  Goal: Patient/Family Education  Outcome: Progressing Towards Goal     Problem: Patient Education: Go to Patient Education Activity  Goal: Patient/Family Education  Outcome: Progressing Towards Goal     Problem: Pressure Injury - Risk of  Goal: *Prevention of pressure injury  Description: Document Sony Scale and appropriate interventions in the flowsheet.   Outcome: Progressing Towards Goal  Note: Pressure Injury Interventions:  Sensory Interventions: Assess changes in LOC    Moisture Interventions: Absorbent underpads    Activity Interventions: Increase time out of bed, PT/OT evaluation    Mobility Interventions: HOB 30 degrees or less    Nutrition Interventions: Document food/fluid/supplement intake    Friction and Shear Interventions: Feet elevated on foot rest                Problem: Patient Education: Go to Patient Education Activity  Goal: Patient/Family Education  Outcome: Progressing Towards Goal

## 2023-03-01 NOTE — PROGRESS NOTES
CRITICAL CARE NOTE    Name: Cheyenne Huerta III   : 1950   MRN: [id number]   Date: 3/1/2023      REASON FOR ICU ADMISSION:  CVA     PRINCIPLE ICU DIAGNOSIS   CVA: Ischemic with right sided weakness s/p post transarterial mechanical thrombectomy  Occlusion of the distal left MCA segment and severe stenosis of left cervical ICA. Mild stenosis right cervical ICA  Dysarthria  HTN  Sleep apnea - on c-pap at night    PATIENT SUMMARY   Cheyenne Huerta III is a 81 y.o. male with a past medical history significant hypertension presents to the ED with chief complaint of extremity weakness. He spoke to his wife at 11:45 PM last night prior to admission and went to sleep, upon her returning from work around 6:37 AM she noted him to be on the ground slumped over altered with new weakness. EMS was called. Presents to [facility] as a level 2 stroke alert. CT of Head showed a left MCA territory infarct and probable MCA thrombus. No intracranial hemorrhage. CTA of H/N showed occlusion of the distal left MCA segment and severe stenosis of left cervical ICA. Mild stenosis right cervical ICA. NIS consulted and the patient was deemed a candidate for mechanical thrombectomy. He was not a candidate for TNK due to LKW time. He is not taking any blood thinners at home. He was transferred to [facility] for neurointervention. He was emergently taken to the angio suite for thrombectomy. NIHSS 18 prior to procedure. He has no prior history of a stroke. COMPREHENSIVE ASSESSMENT & PLAN:SYSTEM BASED     24 HOUR EVENTS: no acute events overnight    NEUROLOGICAL:   CVA: Ischemic with right sided weakness s/p post transarterial mechanical thrombectomy  Occlusion of the distal left MCA segment and severe stenosis of left cervical ICA. Mild stenosis right cervical ICA.   Analgesia: Acetaminophen  Sedation: None  Neuro check Frequency: hourly   Asprin 324 mg PO (or 300 WV if NPO) daily and Plavix 75mg per NGT  Bilateral carotid duplex consistent with less than 50% stenosis of the right internal carotid artery. 70% stenosis of the left internal carotid artery  Per NIS will need left ICA stenosis treated with angioplasty and stenting, Dr. Sherley Jaimes would like to treat during this admission. Planning to go to angio 03/02 in am.  Lipid panel shows .2, continue Lipitor 40 mg QHS  Hgb A1C 5.5  PT/OT/SLP evals - Did not pass swallow eval on 2/28/23, Need enteric access. Neurology signed off   MRI brain on 2/27/23 at 3:30 am with multiple acute left MCA territory infarcts with associated hemorrhagic transformation in left parietal and temporal lobes    PULMONOLOGY:   Respiratory Goals: N/A  PRN  NC     CARDIOVASCULAR:   HTN  SBP Goal of: < 140 mmHg  MAP Goal of: > 65 mmHg  Nicardipine (Cardene) - For above SBP/MAP goals  IVF: None  Echo Normal left ventricular systolic function with a visually estimated EF of 60 - 65%. Left ventricle size is normal. Normal wall thickness. Normal wall motion. Normal diastolic function. GASTROINTESTINAL   Diet/Feeding:  Yes     RENAL/ELECTROLYTE/FLUIDS:   Keep K>4; Mg>2    Trend renal indices/ UOP  Trend Chemistry    ENDOCRINE:   Glycemic Control: Goal 120-180: SSI PRN  Prevent hypoglycemia    HEMATOLOGY/ONCOLOGY:   Transfusion Trigger (Hgb): <7 g/dL  Trend CBC    INFECTIOUS DISEASE:     ANTIBIOTICS TO DATE: None    CULTURES TO DATE: None    ICU DAILY CHECKLIST   Code Status: Full code  DVT Prophylaxis:SCDs  T/L/D: Tubes: None  Lines: Peripheral IV  Drains: None  SUP: None  Diet: Dysphagia diet. Activity Level:PT/OT as tolerated  ABCDEF Bundle/Checklist Completed:Yes  Disposition: Stay in ICU  Multidisciplinary Rounds Completed:  No  Patient/Family Updated: Yes  Goals of Care Discussion/Palliative: Joan Cardenas   03/01 - working with PT. Plan for diagnostic angio and possible intervention of ICA stenosis tomorrow. SUBJECTIVE   ROS:  Pertinent items are noted in HPI.     OBJECTIVE   Labs and Data: Reviewed 23  Medications: Reviewed 23  Imaging: Reviewed 23    Physical Exam:  General:  alert, cooperative, no distress, appears stated age  Eye:  conjunctivae/corneas clear. PERRL, EOM's intact. Fundi benign  Neurologic:  right sided upper and lower extremity weakness. Dysarthria, right sided facial droop. Neck:  normal and no erythema or exudates noted. Lungs:  clear to auscultation bilaterally  Heart:  regular rate and rhythm, S1, S2 normal, no murmur, click, rub or gallop  Abdomen:  soft, non-tender. Bowel sounds normal. No masses,  no organomegaly  Cardiovascular:  Regular rate and rhythm, S1S2 present, without murmur or extra heart sounds, pedal pulses normal, and no edema  Skin:  Normal.     Visit Vitals  /72   Pulse 75   Temp 98.1 °F (36.7 °C)   Resp 23   Ht 5' 8\" (1.727 m)   Wt 149.3 kg (329 lb 2.4 oz)   SpO2 95%   BMI 50.05 kg/m²    O2 Flow Rate (L/min): 2 l/min O2 Device: CPAP mask Temp (24hrs), Av.2 °F (36.8 °C), Min:97.3 °F (36.3 °C), Max:98.9 °F (37.2 °C)           Intake/Output:     Intake/Output Summary (Last 24 hours) at 3/1/2023 1358  Last data filed at 3/1/2023 0800  Gross per 24 hour   Intake 1160 ml   Output 1400 ml   Net -240 ml       Imaging:    CT Results  (Last 48 hours)                 23 0132  CT HEAD WO CONT Final result    Impression:  Evolving left middle cerebral artery infarct with hemorrhagic transformation. No   significant mass effect or midline shift. Narrative:  EXAM: CT HEAD WO CONT       INDICATION: reassess left MCA stroke with hemorhagic transformation       COMPARISON: MRI dated 2023. CONTRAST: None. TECHNIQUE: Unenhanced CT of the head was performed using 5 mm images. Brain and   bone windows were generated. Coronal and sagittal reformats. CT dose reduction   was achieved through use of a standardized protocol tailored for this   examination and automatic exposure control for dose modulation. FINDINGS:   Again noted is evolving ischemia in the left anterior-inferior middle cerebral   artery territory with small area of hemorrhagic transformation. There is no   significant mass effect or midline shift. No new hemorrhage is identified. Ventricles are stable in size. [facility] CXR Results  (Last 48 hours)      None          Echo:  02/26/23    ECHO ADULT COMPLETE 02/28/2023 2/28/2023    Interpretation Summary    Left Ventricle: Normal left ventricular systolic function with a visually estimated EF of 60 - 65%. Left ventricle size is normal. Normal wall thickness. Normal wall motion. Normal diastolic function. Right Ventricle: Not well visualized. Normal wall thickness. Left Atrium: Left atrium is mildly dilated. Non-diagnostic bubble study due to poor image quality. No bubbles were seen in the left sided chambers, but it was also not possible to see bubbles in the right sided chambers. Signed by: Irene Aquino MD on 2/28/2023  2:04 PM       CRITICAL CARE DOCUMENTATION  I had a face to face encounter with the patient, reviewed and interpreted patient data including clinical events, labs, images, vital signs, I/O's, and examined patient. I have discussed the case and the plan and management of the patient's care with the consulting services, the bedside nurses and the respiratory therapist.      NOTE OF PERSONAL INVOLVEMENT IN CARE   This patient has a high probability of imminent, clinically significant deterioration, which requires the highest level of preparedness to intervene urgently. I participated in the decision-making and personally managed or directed the management of the following life and organ supporting interventions that required my frequent assessment to treat or prevent imminent deterioration. I personally spent 40 minutes of critical care time.   This is time spent at this critically ill patient's bedside actively involved in patient care as well as the coordination of care. This does not include any procedural time which has been billed separately.     Rennie Canavan, NP-C    Critical Care Medicine  [facility]

## 2023-03-02 ENCOUNTER — HOSPITAL ENCOUNTER (OUTPATIENT)
Dept: INTERVENTIONAL RADIOLOGY/VASCULAR | Age: 73
Discharge: HOME | DRG: 023 | End: 2023-03-02
Attending: RADIOLOGY
Payer: MEDICARE

## 2023-03-02 ENCOUNTER — ANESTHESIA (OUTPATIENT)
Dept: INTERVENTIONAL RADIOLOGY/VASCULAR | Age: 73
End: 2023-03-02
Payer: MEDICARE

## 2023-03-02 ENCOUNTER — ANESTHESIA EVENT (OUTPATIENT)
Dept: INTERVENTIONAL RADIOLOGY/VASCULAR | Age: 73
End: 2023-03-02
Payer: MEDICARE

## 2023-03-02 ENCOUNTER — APPOINTMENT (OUTPATIENT)
Dept: CT IMAGING | Age: 73
DRG: 023 | End: 2023-03-02
Attending: NURSE PRACTITIONER
Payer: MEDICARE

## 2023-03-02 VITALS
HEART RATE: 72 BPM | OXYGEN SATURATION: 91 % | TEMPERATURE: 98 F | RESPIRATION RATE: 18 BRPM | SYSTOLIC BLOOD PRESSURE: 133 MMHG | DIASTOLIC BLOOD PRESSURE: 69 MMHG

## 2023-03-02 LAB
ACT BLD: 131 SECS (ref 79–138)
ACT BLD: 215 SECS (ref 79–138)
ANION GAP SERPL CALC-SCNC: 5 MMOL/L (ref 5–15)
ASPIRIN TEST, ASPIRN: 402 ARU
BUN SERPL-MCNC: 26 MG/DL (ref 6–20)
BUN/CREAT SERPL: 22 (ref 12–20)
CALCIUM SERPL-MCNC: 8.1 MG/DL (ref 8.5–10.1)
CHLORIDE SERPL-SCNC: 114 MMOL/L (ref 97–108)
CO2 SERPL-SCNC: 24 MMOL/L (ref 21–32)
CREAT SERPL-MCNC: 1.19 MG/DL (ref 0.7–1.3)
ERYTHROCYTE [DISTWIDTH] IN BLOOD BY AUTOMATED COUNT: 13.2 % (ref 11.5–14.5)
GLUCOSE SERPL-MCNC: 113 MG/DL (ref 65–100)
HCT VFR BLD AUTO: 40.9 % (ref 36.6–50.3)
HGB BLD-MCNC: 13.6 G/DL (ref 12.1–17)
MAGNESIUM SERPL-MCNC: 2.3 MG/DL (ref 1.6–2.4)
MCH RBC QN AUTO: 32.6 PG (ref 26–34)
MCHC RBC AUTO-ENTMCNC: 33.3 G/DL (ref 30–36.5)
MCV RBC AUTO: 98.1 FL (ref 80–99)
NRBC # BLD: 0 K/UL (ref 0–0.01)
NRBC BLD-RTO: 0 PER 100 WBC
P2Y12 PLT RESPONSE,PPPR: 186 PRU (ref 194–418)
PHOSPHATE SERPL-MCNC: 3.3 MG/DL (ref 2.6–4.7)
PLATELET # BLD AUTO: 172 K/UL (ref 150–400)
PMV BLD AUTO: 9.5 FL (ref 8.9–12.9)
POTASSIUM SERPL-SCNC: 3.8 MMOL/L (ref 3.5–5.1)
RBC # BLD AUTO: 4.17 M/UL (ref 4.1–5.7)
SODIUM SERPL-SCNC: 143 MMOL/L (ref 136–145)
WBC # BLD AUTO: 6.9 K/UL (ref 4.1–11.1)

## 2023-03-02 PROCEDURE — C1769 GUIDE WIRE: HCPCS

## 2023-03-02 PROCEDURE — 74011250637 HC RX REV CODE- 250/637: Performed by: ANESTHESIOLOGY

## 2023-03-02 PROCEDURE — C1894 INTRO/SHEATH, NON-LASER: HCPCS

## 2023-03-02 PROCEDURE — 74011250637 HC RX REV CODE- 250/637: Performed by: NURSE PRACTITIONER

## 2023-03-02 PROCEDURE — 84100 ASSAY OF PHOSPHORUS: CPT

## 2023-03-02 PROCEDURE — 74011000250 HC RX REV CODE- 250: Performed by: ANESTHESIOLOGY

## 2023-03-02 PROCEDURE — 74011250636 HC RX REV CODE- 250/636: Performed by: ANESTHESIOLOGY

## 2023-03-02 PROCEDURE — 74011250637 HC RX REV CODE- 250/637: Performed by: INTERNAL MEDICINE

## 2023-03-02 PROCEDURE — 70450 CT HEAD/BRAIN W/O DYE: CPT

## 2023-03-02 PROCEDURE — 85576 BLOOD PLATELET AGGREGATION: CPT

## 2023-03-02 PROCEDURE — 85027 COMPLETE CBC AUTOMATED: CPT

## 2023-03-02 PROCEDURE — 36224 PLACE CATH CAROTD ART: CPT

## 2023-03-02 PROCEDURE — 36415 COLL VENOUS BLD VENIPUNCTURE: CPT

## 2023-03-02 PROCEDURE — 037G34Z DILATION OF INTRACRANIAL ARTERY WITH DRUG-ELUTING INTRALUMINAL DEVICE, PERCUTANEOUS APPROACH: ICD-10-PCS | Performed by: PSYCHIATRY & NEUROLOGY

## 2023-03-02 PROCEDURE — 74011250636 HC RX REV CODE- 250/636: Performed by: NURSE ANESTHETIST, CERTIFIED REGISTERED

## 2023-03-02 PROCEDURE — C1887 CATHETER, GUIDING: HCPCS

## 2023-03-02 PROCEDURE — 74011250636 HC RX REV CODE- 250/636: Performed by: NURSE PRACTITIONER

## 2023-03-02 PROCEDURE — 77030008814 HC VLV ACC PLUS MRTM -B

## 2023-03-02 PROCEDURE — 74011250636 HC RX REV CODE- 250/636: Performed by: RADIOLOGY

## 2023-03-02 PROCEDURE — 77030008684 HC TU ET CUF COVD -B: Performed by: ANESTHESIOLOGY

## 2023-03-02 PROCEDURE — 94660 CPAP INITIATION&MGMT: CPT

## 2023-03-02 PROCEDURE — 77030026438 HC STYL ET INTUB CARD -A: Performed by: ANESTHESIOLOGY

## 2023-03-02 PROCEDURE — 74011000250 HC RX REV CODE- 250: Performed by: NURSE PRACTITIONER

## 2023-03-02 PROCEDURE — 2709999900 HC NON-CHARGEABLE SUPPLY

## 2023-03-02 PROCEDURE — 65610000006 HC RM INTENSIVE CARE

## 2023-03-02 PROCEDURE — 83735 ASSAY OF MAGNESIUM: CPT

## 2023-03-02 PROCEDURE — C1760 CLOSURE DEV, VASC: HCPCS

## 2023-03-02 PROCEDURE — 51798 US URINE CAPACITY MEASURE: CPT

## 2023-03-02 PROCEDURE — 74011000636 HC RX REV CODE- 636: Performed by: RADIOLOGY

## 2023-03-02 PROCEDURE — 76060000034 HC ANESTHESIA 1.5 TO 2 HR

## 2023-03-02 PROCEDURE — C1876 STENT, NON-COA/NON-COV W/DEL: HCPCS

## 2023-03-02 PROCEDURE — 80048 BASIC METABOLIC PNL TOTAL CA: CPT

## 2023-03-02 PROCEDURE — 77030019940 HC BLNKT HYPOTHRM STRY -B

## 2023-03-02 PROCEDURE — 77030005401 HC CATH RAD ARRO -A: Performed by: ANESTHESIOLOGY

## 2023-03-02 PROCEDURE — 77010033678 HC OXYGEN DAILY

## 2023-03-02 PROCEDURE — 77030008584 HC TOOL GDWRE DEV TERU -A

## 2023-03-02 PROCEDURE — 74011000250 HC RX REV CODE- 250: Performed by: NURSE ANESTHETIST, CERTIFIED REGISTERED

## 2023-03-02 PROCEDURE — 74011250637 HC RX REV CODE- 250/637

## 2023-03-02 PROCEDURE — 85347 COAGULATION TIME ACTIVATED: CPT

## 2023-03-02 RX ORDER — HEPARIN SODIUM 1000 [USP'U]/ML
INJECTION, SOLUTION INTRAVENOUS; SUBCUTANEOUS AS NEEDED
Status: DISCONTINUED | OUTPATIENT
Start: 2023-03-02 | End: 2023-03-02 | Stop reason: HOSPADM

## 2023-03-02 RX ORDER — LISINOPRIL 20 MG/1
20 TABLET ORAL ONCE
Status: COMPLETED | OUTPATIENT
Start: 2023-03-02 | End: 2023-03-02

## 2023-03-02 RX ORDER — PROPOFOL 10 MG/ML
INJECTION, EMULSION INTRAVENOUS AS NEEDED
Status: DISCONTINUED | OUTPATIENT
Start: 2023-03-02 | End: 2023-03-02 | Stop reason: HOSPADM

## 2023-03-02 RX ORDER — SODIUM CHLORIDE, SODIUM LACTATE, POTASSIUM CHLORIDE, CALCIUM CHLORIDE 600; 310; 30; 20 MG/100ML; MG/100ML; MG/100ML; MG/100ML
INJECTION, SOLUTION INTRAVENOUS
Status: DISCONTINUED | OUTPATIENT
Start: 2023-03-02 | End: 2023-03-02 | Stop reason: HOSPADM

## 2023-03-02 RX ORDER — TAMSULOSIN HYDROCHLORIDE 0.4 MG/1
0.4 CAPSULE ORAL DAILY
COMMUNITY

## 2023-03-02 RX ORDER — LISINOPRIL 20 MG/1
40 TABLET ORAL DAILY
Status: DISCONTINUED | OUTPATIENT
Start: 2023-03-03 | End: 2023-03-07 | Stop reason: HOSPADM

## 2023-03-02 RX ORDER — ONDANSETRON 2 MG/ML
INJECTION INTRAMUSCULAR; INTRAVENOUS AS NEEDED
Status: DISCONTINUED | OUTPATIENT
Start: 2023-03-02 | End: 2023-03-02 | Stop reason: HOSPADM

## 2023-03-02 RX ORDER — GLYCOPYRROLATE 0.2 MG/ML
INJECTION INTRAMUSCULAR; INTRAVENOUS AS NEEDED
Status: DISCONTINUED | OUTPATIENT
Start: 2023-03-02 | End: 2023-03-02 | Stop reason: HOSPADM

## 2023-03-02 RX ORDER — HEPARIN SODIUM 1000 [USP'U]/ML
2000 INJECTION, SOLUTION INTRAVENOUS; SUBCUTANEOUS
Status: DISCONTINUED | OUTPATIENT
Start: 2023-03-02 | End: 2023-03-02

## 2023-03-02 RX ORDER — VERAPAMIL HYDROCHLORIDE 2.5 MG/ML
2.5 INJECTION, SOLUTION INTRAVENOUS
Status: DISCONTINUED | OUTPATIENT
Start: 2023-03-02 | End: 2023-03-02

## 2023-03-02 RX ORDER — SODIUM BICARBONATE 42 MG/ML
1 INJECTION, SOLUTION INTRAVENOUS
Status: DISCONTINUED | OUTPATIENT
Start: 2023-03-02 | End: 2023-03-02

## 2023-03-02 RX ORDER — NEOSTIGMINE METHYLSULFATE 1 MG/ML
INJECTION, SOLUTION INTRAVENOUS AS NEEDED
Status: DISCONTINUED | OUTPATIENT
Start: 2023-03-02 | End: 2023-03-02 | Stop reason: HOSPADM

## 2023-03-02 RX ORDER — LIDOCAINE HYDROCHLORIDE 10 MG/ML
INJECTION, SOLUTION EPIDURAL; INFILTRATION; INTRACAUDAL; PERINEURAL
Status: DISPENSED
Start: 2023-03-02 | End: 2023-03-02

## 2023-03-02 RX ORDER — ROCURONIUM BROMIDE 10 MG/ML
INJECTION, SOLUTION INTRAVENOUS AS NEEDED
Status: DISCONTINUED | OUTPATIENT
Start: 2023-03-02 | End: 2023-03-02 | Stop reason: HOSPADM

## 2023-03-02 RX ORDER — SUCCINYLCHOLINE CHLORIDE 20 MG/ML
INJECTION INTRAMUSCULAR; INTRAVENOUS AS NEEDED
Status: DISCONTINUED | OUTPATIENT
Start: 2023-03-02 | End: 2023-03-02 | Stop reason: HOSPADM

## 2023-03-02 RX ORDER — LIDOCAINE HYDROCHLORIDE 10 MG/ML
10 INJECTION INFILTRATION; PERINEURAL
Status: ACTIVE | OUTPATIENT
Start: 2023-03-02 | End: 2023-03-02

## 2023-03-02 RX ORDER — LIDOCAINE 40 MG/G
CREAM TOPICAL
Status: DISCONTINUED | OUTPATIENT
Start: 2023-03-02 | End: 2023-03-02

## 2023-03-02 RX ORDER — LIDOCAINE HYDROCHLORIDE 20 MG/ML
INJECTION, SOLUTION EPIDURAL; INFILTRATION; INTRACAUDAL; PERINEURAL AS NEEDED
Status: DISCONTINUED | OUTPATIENT
Start: 2023-03-02 | End: 2023-03-02 | Stop reason: HOSPADM

## 2023-03-02 RX ORDER — DEXAMETHASONE SODIUM PHOSPHATE 4 MG/ML
INJECTION, SOLUTION INTRA-ARTICULAR; INTRALESIONAL; INTRAMUSCULAR; INTRAVENOUS; SOFT TISSUE AS NEEDED
Status: DISCONTINUED | OUTPATIENT
Start: 2023-03-02 | End: 2023-03-02 | Stop reason: HOSPADM

## 2023-03-02 RX ADMIN — LISINOPRIL 20 MG: 20 TABLET ORAL at 08:53

## 2023-03-02 RX ADMIN — Medication 4000 UNITS: at 13:22

## 2023-03-02 RX ADMIN — GLYCOPYRROLATE 0.1 MG: 0.2 INJECTION INTRAMUSCULAR; INTRAVENOUS at 13:08

## 2023-03-02 RX ADMIN — NICARDIPINE HYDROCHLORIDE 10 MG/HR: 25 INJECTION, SOLUTION INTRAVENOUS at 06:24

## 2023-03-02 RX ADMIN — SODIUM CHLORIDE, PRESERVATIVE FREE 10 ML: 5 INJECTION INTRAVENOUS at 05:04

## 2023-03-02 RX ADMIN — Medication 4000 UNITS: at 13:23

## 2023-03-02 RX ADMIN — ROCURONIUM BROMIDE 20 MG: 10 SOLUTION INTRAVENOUS at 12:20

## 2023-03-02 RX ADMIN — NICARDIPINE HYDROCHLORIDE 12.5 MG/HR: 25 INJECTION, SOLUTION INTRAVENOUS at 03:41

## 2023-03-02 RX ADMIN — NICARDIPINE HYDROCHLORIDE 12.5 MG/HR: 25 INJECTION, SOLUTION INTRAVENOUS at 01:24

## 2023-03-02 RX ADMIN — SODIUM CHLORIDE, POTASSIUM CHLORIDE, SODIUM LACTATE AND CALCIUM CHLORIDE: 600; 310; 30; 20 INJECTION, SOLUTION INTRAVENOUS at 12:04

## 2023-03-02 RX ADMIN — Medication 4000 UNITS: at 13:21

## 2023-03-02 RX ADMIN — SODIUM CHLORIDE, PRESERVATIVE FREE 10 ML: 5 INJECTION INTRAVENOUS at 22:05

## 2023-03-02 RX ADMIN — SODIUM CHLORIDE 25 MCG/MIN: 9 INJECTION, SOLUTION INTRAVENOUS at 12:26

## 2023-03-02 RX ADMIN — PROPOFOL 200 MG: 10 INJECTION, EMULSION INTRAVENOUS at 12:13

## 2023-03-02 RX ADMIN — ATORVASTATIN CALCIUM 40 MG: 40 TABLET, FILM COATED ORAL at 22:01

## 2023-03-02 RX ADMIN — HEPARIN SODIUM 2000 UNITS: 1000 INJECTION, SOLUTION INTRAVENOUS; SUBCUTANEOUS at 13:00

## 2023-03-02 RX ADMIN — NICARDIPINE HYDROCHLORIDE 2.5 MG/HR: 25 INJECTION, SOLUTION INTRAVENOUS at 10:16

## 2023-03-02 RX ADMIN — GLYCOPYRROLATE 0.1 MG: 0.2 INJECTION INTRAMUSCULAR; INTRAVENOUS at 13:06

## 2023-03-02 RX ADMIN — DEXAMETHASONE SODIUM PHOSPHATE 4 MG: 4 INJECTION, SOLUTION INTRAMUSCULAR; INTRAVENOUS at 12:46

## 2023-03-02 RX ADMIN — GLYCOPYRROLATE 0.2 MG: 0.2 INJECTION INTRAMUSCULAR; INTRAVENOUS at 13:03

## 2023-03-02 RX ADMIN — ENOXAPARIN SODIUM 30 MG: 100 INJECTION SUBCUTANEOUS at 08:51

## 2023-03-02 RX ADMIN — SODIUM CHLORIDE, PRESERVATIVE FREE 10 ML: 5 INJECTION INTRAVENOUS at 15:49

## 2023-03-02 RX ADMIN — ENOXAPARIN SODIUM 30 MG: 100 INJECTION SUBCUTANEOUS at 20:50

## 2023-03-02 RX ADMIN — IOPAMIDOL 63 ML: 612 INJECTION, SOLUTION INTRAVENOUS at 13:19

## 2023-03-02 RX ADMIN — Medication 4000 UNITS: at 13:24

## 2023-03-02 RX ADMIN — LIDOCAINE HYDROCHLORIDE 80 MG: 20 INJECTION, SOLUTION EPIDURAL; INFILTRATION; INTRACAUDAL; PERINEURAL at 12:13

## 2023-03-02 RX ADMIN — ACETAMINOPHEN 650 MG: 325 TABLET ORAL at 18:33

## 2023-03-02 RX ADMIN — LISINOPRIL 20 MG: 20 TABLET ORAL at 15:50

## 2023-03-02 RX ADMIN — Medication 4000 UNITS: at 13:20

## 2023-03-02 RX ADMIN — NEOSTIGMINE METHYLSULFATE 2 MG: 1 INJECTION, SOLUTION INTRAVENOUS at 13:22

## 2023-03-02 RX ADMIN — ASPIRIN 81 MG CHEWABLE TABLET 324 MG: 81 TABLET CHEWABLE at 08:53

## 2023-03-02 RX ADMIN — CLOPIDOGREL BISULFATE 75 MG: 75 TABLET ORAL at 08:53

## 2023-03-02 RX ADMIN — SUCCINYLCHOLINE CHLORIDE 200 MG: 20 INJECTION, SOLUTION INTRAMUSCULAR; INTRAVENOUS at 12:13

## 2023-03-02 RX ADMIN — GLYCOPYRROLATE 0.4 MG: 0.2 INJECTION INTRAMUSCULAR; INTRAVENOUS at 13:22

## 2023-03-02 RX ADMIN — ONDANSETRON HYDROCHLORIDE 4 MG: 2 INJECTION, SOLUTION INTRAMUSCULAR; INTRAVENOUS at 13:13

## 2023-03-02 RX ADMIN — HEPARIN SODIUM 5000 UNITS: 1000 INJECTION, SOLUTION INTRAVENOUS; SUBCUTANEOUS at 12:48

## 2023-03-02 RX ADMIN — ROCURONIUM BROMIDE 5 MG: 10 SOLUTION INTRAVENOUS at 12:13

## 2023-03-02 RX ADMIN — NICARDIPINE HYDROCHLORIDE 12.5 MG/HR: 25 INJECTION, SOLUTION INTRAVENOUS at 06:11

## 2023-03-02 NOTE — PROGRESS NOTES
Transition of Care Plan  RUR- 13%  DISPOSITION: TBD/subject to change pending review and recommendations; pending medical progression  Plans for LICA angioplasty/stenting today  Patient with severe expressive aphasia  PT/OT/ SLP consulted and following. IPR recommended. CM met with patient family at bedside. Family requesting for referral to be placed to [facility] [place] as 1st choice and [facility] for 2nd Choice. Referrals placed via allscripts. F/U with PCP/Specialist    Transport: TBD what's medically appropriate at discharge    CM will continue to follow and assist with JASSI needs as they arise.     ANALY Cortes/YOUNG  Care Management  3:10 PM

## 2023-03-02 NOTE — PROGRESS NOTES
Occupational Therapy: defer    Chart reviewed, conferred with RN. Patient awaiting transport to angio this AM for L ICA stenting. Patient also more lethargic in AM and prefers to be seen later in the day. Will follow-up tomorrow for OT re-evaluation.     Tesha Krause, OTR/L

## 2023-03-02 NOTE — ANESTHESIA POSTPROCEDURE EVALUATION
* No procedures listed *.    general    Anesthesia Post Evaluation        Patient participation: complete - patient participated  Level of consciousness: awake  Pain management: adequate  Airway patency: patent  Anesthetic complications: no  Cardiovascular status: hemodynamically stable  Respiratory status: acceptable  Hydration status: acceptable  Comments: The patient is ready for PACU discharge. Elva Giraldo DO                   Post anesthesia nausea and vomiting:  controlled      INITIAL Post-op Vital signs: No vitals data found for the desired time range.

## 2023-03-02 NOTE — PROGRESS NOTES
Neurocritical Care Brief Progress Note:  79-year-old male with acute left MCA CVA due to left MCA occlusion with associated hemorrhagic conversion s/p left MCA thrombectomy on 2/26/23. Left ICA stenosis. Physical Exam:  Gen: NAD, calm, cooperative  Neuro: A&O to person and place. Follows commands. Can clearly say yes/no. Affect normal. PERRL, 3 mm bilaterally. Blinks to threat. No disconjugate gaze present. EOMI. Mild right facial droop. . Palate symmetric. Tongue midline. Lola spontaneously. Strength 5/5 in left UE and LE BL. Strength 3.5 in right UE and LE BL. Negative drift. Bulk and tone normal. No involuntary movements. Gait deferred. Sensation intact to light touch x 4. Toes down going. Skin: Warm, dry, color appropriate for ethnicity. PLAN:   Continue DAPT with  mg po q day and Plavix 75 mg po q day. Continue Lipitor 40 mg po q hs.    P2Y12 196 and Aspirin level 401.   -NPO after MN except meds for angiogram with L ICA angioplasty/stent placement tomorrow  -Repeat CTH in am to assess small left BG hemorrhage  -Continue frequent neuro checks    Yvonne Henson NP  Neurocritical Care Nurse Practitioner  [phone]

## 2023-03-02 NOTE — ANESTHESIA PREPROCEDURE EVALUATION
Relevant Problems   NEUROLOGY   (+) Stroke [facility])       Anesthetic History   No history of anesthetic complications            Review of Systems / Medical History  Patient summary reviewed, nursing notes reviewed and pertinent labs reviewed    Pulmonary        Sleep apnea           Neuro/Psych       CVA       Cardiovascular    Hypertension              Exercise tolerance: >4 METS     GI/Hepatic/Renal  Within defined limits              Endo/Other        Morbid obesity     Other Findings              Physical Exam    Airway  Mallampati: II  TM Distance: 4 - 6 cm  Neck ROM: normal range of motion   Mouth opening: Normal     Cardiovascular    Rhythm: regular  Rate: normal         Dental  No notable dental hx       Pulmonary  Breath sounds clear to auscultation               Abdominal  Abdominal exam normal       Other Findings            Anesthetic Plan    ASA: 3  Anesthesia type: general    Monitoring Plan: Arterial line        Anesthetic plan and risks discussed with: Family

## 2023-03-02 NOTE — PROGRESS NOTES
NIS Brief Progress Note:    Rounded on pt post angio, he is resting comfortably and denies pain. Right groin arteriotomy site soft and non-tender on palpation, dressing is C/D/I, with no active bleeding or drainage noted.      Labs reviewed:  ARU: 402, therapeutic  PRU: 186, therapeutic      Julian Mayer NP  Neurocritical Care Nurse Practitioner  [phone]

## 2023-03-02 NOTE — PROGRESS NOTES
0000: Bedside and Verbal shift change report given to MICHELLE Leyva & Tessa No, student nurse (oncoming nurse) by Mahalia Soulier, RN (offgoing nurse). Report included the following information SBAR, Kardex, Intake/Output, MAR, Med Rec Status, Cardiac Rhythm NSR, Alarm Parameters , and Dual Neuro Assessment. 2203: Pt off unit to CT with RN     0913: Pt returned from CT    0730: Bedside and Verbal shift change report given to MICHELLE Salazar (oncoming nurse) by Jaymie Kaba RN (offgoing nurse). Report included the following information SBAR, Kardex, Intake/Output, MAR, Recent Results, Cardiac Rhythm NSR, Alarm Parameters , and Dual Neuro Assessment.

## 2023-03-02 NOTE — PROGRESS NOTES
Neurointerventional Surgery Progress Note  Neurocritical Care NP      Admit Date: 2/26/2023   LOS: 4 days      Daily Progress Note: 3/2/2023    Assessment:   Acute Left MCA Ischemic CVA due to left MCA occlusion with associated hemorrhagic conversion, s/p left MCA thrombectomy 2/26/23     Left ICA stenosis    Plan:      - Plans for LICA angioplasty/stenting today   - Has been NPO since 0000 except meds   - Repeat CT head in am stable   - Cont DAPT with  mg and Plavix 75 mg daily   - P2Y12 and aspirin assays within goal  - Lipid panel shows .2, continue Lipitor 40 mg QHS  - SBP goal 100-140 post stenting  - Neurology signed off    Plan d/w Dr. Shavon Arroyo and Dr Al Messer. HPI:Junito Isidro III is a 81 y.o. male with PMH significant for HTN, pre-diabetes, and high cholesterol who initially presented to [facility] 2/27/23 with right-sided weakness and aphasia. The wife reported that he was fine the night before. The next morning around 6:30 am she noticed that the TV was still on in the living room and found the patient on the floor with difficulty talking. 911 was called and the patient was brought in to the ED as a stroke alert. CT of Head showed a left MCA territory infarct and probable MCA thrombus. No intracranial hemorrhage. CTA of H/N showed occlusion of the distal left MCA segment and severe stenosis of left cervical ICA. Mild stenosis right cervical ICA. NIS consulted and the patient was deemed a candidate for mechanical thrombectomy. He was not a candidate for TNK due to LKW time. He is not taking any blood thinners at home. He was transferred to [facility] for neurointervention. He was emergently taken to the angio suite for thrombectomy. NIHSS 18 prior to procedure. He has no prior history of a stroke. Subjective:     No neuro events overnight. Has been NPO since 0000. Pt's son and daughter at bedside updated on the current plan of care. Unable to obtain meaningful ROS due to aphasia.      Current Facility-Administered Medications   Medication Dose Route Frequency Provider Last Rate Last Admin    aspirin chewable tablet 324 mg  324 mg Oral DAILY Alec Alba NP   324 mg at 03/02/23 0853    clopidogreL (PLAVIX) tablet 75 mg  75 mg Oral DAILY Alec Alba NP   75 mg at 03/02/23 0853    lisinopriL (PRINIVIL, ZESTRIL) tablet 20 mg  20 mg Oral DAILY Jonny Kaufman MD   20 mg at 03/02/23 0853    labetaloL (NORMODYNE) tablet 100 mg  100 mg Oral Q12H Drake Perez NP   100 mg at 03/01/23 2323    labetaloL (NORMODYNE;TRANDATE) injection 20 mg  20 mg IntraVENous Q2H PRN Drake Perez NP        bacitracin 500 unit/gram packet 1 Packet  1 Packet Topical PRN Jonny Kaufman MD        alteplase (CATHFLO) 1 mg in sterile water (preservative free) 1 mL injection  1 mg InterCATHeter PRN Jonny Kaufman MD   1 mg at 03/01/23 1808    atorvastatin (LIPITOR) tablet 40 mg  40 mg Oral QHS DWAYNE Aquino   40 mg at 03/01/23 2112    sodium chloride (NS) flush 5-40 mL  5-40 mL IntraVENous Q8H Annalise Barba MD   10 mL at 03/02/23 0504    sodium chloride (NS) flush 5-40 mL  5-40 mL IntraVENous PRN Annalise Barba MD        acetaminophen (TYLENOL) tablet 650 mg  650 mg Oral Q6H PRN Annalise Barba MD        Or    acetaminophen (TYLENOL) suppository 650 mg  650 mg Rectal Q6H PRN Annalise Barba MD   650 mg at 02/27/23 0438    polyethylene glycol (MIRALAX) packet 17 g  17 g Oral DAILY PRN Annalise Barba MD        ondansetron (ZOFRAN ODT) tablet 4 mg  4 mg Oral Q8H PRN Annalise Barba MD        Or    ondansetron (ZOFRAN) injection 4 mg  4 mg IntraVENous Q6H PRN Annalise Barba MD        sodium chloride (NS) flush 5-40 mL  5-40 mL IntraVENous Q8H Cary Mccarthy NP   10 mL at 03/02/23 0504    sodium chloride (NS) flush 5-40 mL  5-40 mL IntraVENous PRN Cary Mccarthy NP        enoxaparin (LOVENOX) injection 30 mg  30 mg SubCUTAneous Q12H Annalise Barba MD   30 mg at 23 0851    niCARdipine (CARDENE) 25 mg in 0.9% sodium chloride 250 mL ([id number])  0-15 mg/hr IntraVENous TITRATE Sandi Mccarthy NP 50 mL/hr at 23 0954 5 mg/hr at 23 0954    hydrALAZINE (APRESOLINE) 20 mg/mL injection 20 mg  20 mg IntraVENous Q6H PRN Sandi Mccarthy NP   20 mg at 23 2249        No Known Allergies    Review of Systems:  Review of systems not obtained due to patient factors. Objective:     Vital signs  Temp (24hrs), Av.9 °F (37.2 °C), Min:98.2 °F (36.8 °C), Max:99.8 °F (37.7 °C)   No intake/output data recorded.  1901 -  0700  In: 2501.7 [I.V.:2501.7]  Out: 1100 [Urine:1100]    Visit Vitals  BP (!) 117/57   Pulse (!) 57   Temp 98.2 °F (36.8 °C)   Resp 16   Ht 5' 8\" (1.727 m)   Wt 324 lb 15.3 oz (147.4 kg)   SpO2 94%   BMI 49.41 kg/m²    O2 Flow Rate (L/min): 2 l/min O2 Device: CPAP mask       Intake/Output Summary (Last 24 hours) at 3/2/2023 1003  Last data filed at 3/2/2023 0700  Gross per 24 hour   Intake 2273.76 ml   Output 600 ml   Net 1673.76 ml        Physical Exam:  GENERAL: Calm, cooperative, adult  male  SKIN: Skin warm and dry to touch. Appropriate for ethnicity. Neurologic Exam:  Mental Status:             Alert, unable to assess orientation due to aphasia. Yes says \"yes\" over and over again. Language:                   Severe expressive aphasia and some receptive aphasia. Rpeats yes, does not answer questions appropriately. Will show a thumbs up or 2 fingers but with frequent coaching. Cranial Nerves:                                                 Pupils 2 mm, equal, round and briskly reactive to light. No blink to threat in right eye. Blinks to threat in left eye. Right-sided visual neglect. EOMs intact, tracks examiner around room but will not follow to command                                      Mild right facial droop.       Motor: Right-sided weakness present. RUE and RLE drift present. Moves LUE and LLE with no drift. No involuntary movements. Sensation:                   ALEKSEY due to aphasia. Withdraws to pain in all extremities. Coordination & Gait:   Unable to follow commands well enough to perform FTN or HTS. Gait deferred. 24 hour results:  Recent Results (from the past 12 hour(s))   CBC W/O DIFF    Collection Time: 03/02/23  5:01 AM   Result Value Ref Range    WBC 6.9 4.1 - 11.1 K/uL    RBC 4.17 4.10 - 5.70 M/uL    HGB 13.6 12.1 - 17.0 g/dL    HCT 40.9 36.6 - 50.3 %    MCV 98.1 80.0 - 99.0 FL    MCH 32.6 26.0 - 34.0 PG    MCHC 33.3 30.0 - 36.5 g/dL    RDW 13.2 11.5 - 14.5 %    PLATELET 960 450 - 149 K/uL    MPV 9.5 8.9 - 12.9 FL    NRBC 0.0 0  WBC    ABSOLUTE NRBC 0.00 0.00 - 4.45 K/uL   METABOLIC PANEL, BASIC    Collection Time: 03/02/23  5:01 AM   Result Value Ref Range    Sodium 143 136 - 145 mmol/L    Potassium 3.8 3.5 - 5.1 mmol/L    Chloride 114 (H) 97 - 108 mmol/L    CO2 24 21 - 32 mmol/L    Anion gap 5 5 - 15 mmol/L    Glucose 113 (H) 65 - 100 mg/dL    BUN 26 (H) 6 - 20 MG/DL    Creatinine 1.19 0.70 - 1.30 MG/DL    BUN/Creatinine ratio 22 (H) 12 - 20      eGFR >60 >60 ml/min/1.73m2    Calcium 8.1 (L) 8.5 - 10.1 MG/DL   MAGNESIUM    Collection Time: 03/02/23  5:01 AM   Result Value Ref Range    Magnesium 2.3 1.6 - 2.4 mg/dL   PHOSPHORUS    Collection Time: 03/02/23  5:01 AM   Result Value Ref Range    Phosphorus 3.3 2.6 - 4.7 MG/DL     Imaging:  CTH: CT evidence of left MCA territory infarct and probable MCA thrombus. No intracranial hemorrhage. CTA: Occlusion of distal M1 segment of left MCA. Left MCA territory infarction. Severe stenosis left cervical ICA. Mild stenosis right cervical ICA. MRI: Acute left MCA territory infarcts with associated hemorrhagic transformation.     Bilateral carotid duplex 2/26/23: Consistent with less than 50% stenosis of the right internal carotid artery. Consistent with greater than 70% stenosis of the left internal carotid artery.     Indigo Case NP  Neurocritical Care Nurse Practitioner

## 2023-03-02 NOTE — PROGRESS NOTES
Reviewed chart and note patient currently NPO for angioplasty and stenting today. Patient also drowsy this morning. Will follow as medically appropriate for dysphagia and aphasia treatment post procedure. Thanks. Iwona Yeboah M.CD. CCC-SLP     1754: discussed case with nsg and neuro NP, patient remains off the floor in angio then bedrest on return to floor. Will defer session this date and continue to follow closely. Thanks.

## 2023-03-02 NOTE — PROGRESS NOTES
Physical Therapy 3/2/23    Chart reviewed, conferred with RN. Patient awaiting transport to angio this AM for L ICA stenting. Patient also more lethargic in AM and prefers to be seen later in the day. Will defer and follow up tomorrow for re-evaluation.     Thank you for your consideration,    Kaushik Romero, PT, DPT

## 2023-03-02 NOTE — PROGRESS NOTES
2000: Bedside shift change report given to Augustine Collins (oncoming nurse) by Alfredo (offgoing nurse). Report included the following information SBAR, Kardex, MAR, and Cardiac Rhythm NSR . SBP parameters 100-140 due to hemorrhagic conversion of infarct. Titrate nicardipine to treat. 2100: Patient's daughter, Joe Mitchell, at bedside. Reviewing patient plan of care, BP goals, angioplasty and CT scheduled for tomorrow. 2115: Patient given IV labetalol push for BP management. Patient's daughter conferring with NeuroIR NP, Saranya Cartwright, regarding patient's treatment plan.

## 2023-03-02 NOTE — PROGRESS NOTES
1145: Pt arrives via bed to angio department  for cerebral angiogram, stenting and angioplasty of left ICA, arteriotomy closure device procedure. All assessments completed and consent was reviewed. Education given was regarding procedure, anesthesia, post-procedure care and  management/follow-up. Opportunity for questions was provided and all questions and concerns were addressed. 1210: Patient placed on angio table with assistance. IR Staff and anesthesia present at bedside. Anesthesia to remain at bedside to manage pt airway, medications, VS and pt status. 1243: Sheath placed in right groin by MD.    1340: TRANSFER - OUT REPORT:    Verbal report given to Sandhya RN(name) on [facility]  being transferred to Icu 11(unit) for routine post - op       Report consisted of patients Situation, Background, Assessment and   Recommendations(SBAR). Information from the following report(s) Procedure Summary, Intake/Output, MAR, and Alarm Parameters  was reviewed with the receiving nurse.     Lines:   PICC Triple Lumen 49/09/39 Left;Cephalic (Active)   Central Line Being Utilized Yes 03/02/23 0800   Criteria for Appropriate Use Limited/no vessel suitable for conventional peripheral access 03/02/23 0800   Site Assessment Clean, dry, & intact 03/02/23 0800   Phlebitis Assessment 0 03/02/23 0800   Infiltration Assessment 0 03/02/23 0800   Date of Last Dressing Change 02/28/23 03/02/23 0800   Dressing Status Clean, dry, & intact 03/02/23 0800   Dressing Type Transparent w/CHG gel;Stabilization/securement device 03/02/23 0800   Action Taken Open ports on tubing capped 03/02/23 0800   Hub Color/Line Status Dia [place] 03/02/23 0800   Positive Blood Return (Site #1) Yes 03/02/23 0800   Hub Color/Line Status White 03/02/23 0800   Positive Blood Return (Site #2) Yes 03/02/23 0800   Hub Color/Line Status Red 03/02/23 0800   Positive Blood Return (Site #3) Yes 03/02/23 0800   Alcohol Cap Used Yes 03/02/23 0800 Opportunity for questions and clarification was provided.       Patient transported with:   Monitor  O2 @ 3 liters  Registered Nurse  CRNA

## 2023-03-02 NOTE — PROGRESS NOTES
CRITICAL CARE NOTE    Name: Hayley Hernandez III   : 1950   MRN: [id number]   Date: 3/2/2023      REASON FOR ICU ADMISSION:  CVA     PRINCIPLE ICU DIAGNOSIS   CVA: Ischemic with right sided weakness s/p post transarterial mechanical thrombectomy  Occlusion of the distal left MCA segment and severe stenosis of left cervical ICA. Mild stenosis right cervical ICA s/p Carotid bifurcation angioplasty and stenting: Left  Dysarthria - improved  HTN  Sleep apnea - on c-pap at night    PATIENT SUMMARY   Hayley Hernandez III is a 81 y.o. male with a past medical history significant hypertension presents to the ED with chief complaint of extremity weakness. He spoke to his wife at 11:45 PM last night prior to admission and went to sleep, upon her returning from work around 6:37 AM she noted him to be on the ground slumped over altered with new weakness. EMS was called. Presents to [facility] as a level 2 stroke alert. CT of Head showed a left MCA territory infarct and probable MCA thrombus. No intracranial hemorrhage. CTA of H/N showed occlusion of the distal left MCA segment and severe stenosis of left cervical ICA. Mild stenosis right cervical ICA. NIS consulted and the patient was deemed a candidate for mechanical thrombectomy. He was not a candidate for TNK due to LKW time. He is not taking any blood thinners at home. He was transferred to [facility] for neurointervention. He was emergently taken to the angio suite for thrombectomy. NIHSS 18 prior to procedure. He has no prior history of a stroke. COMPREHENSIVE ASSESSMENT & PLAN:SYSTEM BASED     24 HOUR EVENTS: no acute events overnight. Went to angio today. NEUROLOGICAL:   CVA: Ischemic with right sided weakness s/p post transarterial mechanical thrombectomy  Occlusion of the distal left MCA segment and severe stenosis of left cervical ICA. Mild stenosis right cervical ICA. s/p Carotid bifurcation angioplasty and stenting: Left  Analgesia: Acetaminophen  Sedation: None  Neuro check Frequency: hourly   Asprin 324 mg PO (or 300 DC if NPO) daily and Plavix 75mg per NGT  Planning to go to angio 03/02 in am.  Lipid panel shows .2, continue Lipitor 40 mg QHS  Hgb A1C 5.5  PT/OT/SLP evals - Did not pass swallow eval on 2/28/23, Need enteric access. Neurology signed off   MRI brain on 2/27/23 at 3:30 am with multiple acute left MCA territory infarcts with associated hemorrhagic transformation in left parietal and temporal lobes    PULMONOLOGY:   Respiratory Goals: N/A  PRN  NC     CARDIOVASCULAR:   HTN  SBP Goal of: < 140 mmHg  MAP Goal of: > 65 mmHg  None - For above SBP/MAP goals  IVF: None  Echo Normal left ventricular systolic function with a visually estimated EF of 60 - 65%. Left ventricle size is normal. Normal wall thickness. Normal wall motion. Normal diastolic function. Continue lisinopril 40mg Daily     GASTROINTESTINAL   Diet/Feeding:  Yes     RENAL/ELECTROLYTE/FLUIDS:   Keep K>4; Mg>2    Trend renal indices/ UOP  Trend Chemistry    ENDOCRINE:   Glycemic Control: Goal 120-180: SSI PRN  Prevent hypoglycemia    HEMATOLOGY/ONCOLOGY:   Transfusion Trigger (Hgb): <7 g/dL  Trend CBC    INFECTIOUS DISEASE:     ANTIBIOTICS TO DATE: None    CULTURES TO DATE: None    ICU DAILY CHECKLIST   Code Status: Full code  DVT Prophylaxis:SCDs  T/L/D: Tubes: None  Lines: Peripheral IV  Drains: None  SUP: None  Diet: Dysphagia diet. Activity Level:PT/OT as tolerated  ABCDEF Bundle/Checklist Completed:Yes  Disposition: Stay in ICU  Multidisciplinary Rounds Completed:  Yes  Patient/Family Updated: Yes  Goals of Care Discussion/Palliative: Joan Cardenas   03/01 - working with PT. Plan for diagnostic angio and possible intervention of ICA stenosis tomorrow. SUBJECTIVE   ROS:  Pertinent items are noted in HPI.     OBJECTIVE   Labs and Data: Reviewed 03/02/23  Medications: Reviewed 03/02/23  Imaging: Reviewed 03/02/23    Physical Exam:  General:  alert, cooperative, no distress, appears stated age  Eye:  conjunctivae/corneas clear. PERRL, EOM's intact. Fundi benign  Neurologic:  right sided upper and lower extremity weakness. Dysarthria, right sided facial droop. Neck:  normal and no erythema or exudates noted. Lungs:  clear to auscultation bilaterally  Heart:  regular rate and rhythm, S1, S2 normal, no murmur, click, rub or gallop  Abdomen:  soft, non-tender. Bowel sounds normal. No masses,  no organomegaly  Cardiovascular:  Regular rate and rhythm, S1S2 present, without murmur or extra heart sounds, pedal pulses normal, and no edema  Skin:  Normal.     Visit Vitals  /69   Pulse 64   Temp 98.6 °F (37 °C)   Resp 23   Ht 5' 8\" (1.727 m)   Wt 147.4 kg (324 lb 15.3 oz)   SpO2 91%   BMI 49.41 kg/m²    O2 Flow Rate (L/min): 2 l/min O2 Device: CPAP mask Temp (24hrs), Av.7 °F (37.1 °C), Min:98 °F (36.7 °C), Max:99.8 °F (37.7 °C)           Intake/Output:     Intake/Output Summary (Last 24 hours) at 3/2/2023 1731  Last data filed at 3/2/2023 0700  Gross per 24 hour   Intake 1844.17 ml   Output 300 ml   Net 1544.17 ml       Imaging:    CT Results  (Last 48 hours)                 23 0429  CT HEAD WO CONT Final result    Impression:  Very little change in the left MCA infarct and petechial hemorrhage. Narrative:  EXAM: CT HEAD WO CONT       INDICATION: re assess reperfusion hemorrhage       COMPARISON: 2023. CONTRAST: None. TECHNIQUE: Unenhanced CT of the head was performed using 5 mm images. Brain and   bone windows were generated. Coronal and sagittal reformats. CT dose reduction   was achieved through use of a standardized protocol tailored for this   examination and automatic exposure control for dose modulation. FINDINGS:   Evolving left MCA infarct with evolving petechial hemorrhage. . Very little   change. Ventricles are stable in size.                  CXR Results  (Last 48 hours)      None Echo:  02/26/23    ECHO ADULT COMPLETE 02/28/2023 2/28/2023    Interpretation Summary    Left Ventricle: Normal left ventricular systolic function with a visually estimated EF of 60 - 65%. Left ventricle size is normal. Normal wall thickness. Normal wall motion. Normal diastolic function. Right Ventricle: Not well visualized. Normal wall thickness. Left Atrium: Left atrium is mildly dilated. Non-diagnostic bubble study due to poor image quality. No bubbles were seen in the left sided chambers, but it was also not possible to see bubbles in the right sided chambers. Signed by: Linden Prince MD on 2/28/2023  2:04 PM       CRITICAL CARE DOCUMENTATION  I had a face to face encounter with the patient, reviewed and interpreted patient data including clinical events, labs, images, vital signs, I/O's, and examined patient. I have discussed the case and the plan and management of the patient's care with the consulting services, the bedside nurses and the respiratory therapist.      NOTE OF PERSONAL INVOLVEMENT IN CARE   This patient has a high probability of imminent, clinically significant deterioration, which requires the highest level of preparedness to intervene urgently. I participated in the decision-making and personally managed or directed the management of the following life and organ supporting interventions that required my frequent assessment to treat or prevent imminent deterioration. I personally spent 30 minutes of critical care time. This is time spent at this critically ill patient's bedside actively involved in patient care as well as the coordination of care. This does not include any procedural time which has been billed separately.     Carol Knight, NP-C    Critical Care Medicine  [facility]

## 2023-03-02 NOTE — PROGRESS NOTES
Problem: TIA/CVA Stroke: Day 2 Until Discharge  Goal: Activity/Safety  Outcome: Progressing Towards Goal  Goal: Diagnostic Test/Procedures  Outcome: Progressing Towards Goal  Goal: Nutrition/Diet  Outcome: Progressing Towards Goal  Goal: Discharge Planning  Outcome: Progressing Towards Goal  Goal: Medications  Outcome: Progressing Towards Goal  Goal: Respiratory  Outcome: Progressing Towards Goal  Goal: Treatments/Interventions/Procedures  Outcome: Progressing Towards Goal  Goal: Psychosocial  Outcome: Progressing Towards Goal  Goal: *Verbalizes anxiety and depression are reduced or absent  Outcome: Progressing Towards Goal  Goal: *Absence of aspiration  Outcome: Progressing Towards Goal  Goal: *Absence of deep venous thrombosis signs and symptoms(Stroke Metric)  Outcome: Progressing Towards Goal  Goal: *Optimal pain control at patient's stated goal  Outcome: Progressing Towards Goal  Goal: *Tolerating diet  Outcome: Progressing Towards Goal  Goal: *Ability to perform ADLs and demonstrates progressive mobility and function  Outcome: Progressing Towards Goal

## 2023-03-02 NOTE — BRIEF OP NOTE
Neuro-Interventional Surgery - Brief procedure note      Patient: Ysabel Gupta MRN: [id number]     YOB: 1950  Age: 81 y.o. Sex: male      Service: Neuro-Interventional Surgery    Date of Procedure: 3/2/2023    Pre-Procedure Diagnosis: Carotid stenosis    Post-Procedure Diagnosis: SAME    Procedure(s): Carotid bifurcation angioplasty and stenting: Left    Vessels selected: Left common carotid artery and Left internal carotid artery    Brief Description of Procedure: Severe > 70% focal stenosis of LICA origin from bifurcation. Angioplasty and stenting done successfully without DEP, given tortuosity of the cervical LICA. No immediate complications. Right common femoral arterial puncture site hemostasis achieved by deploying 8 F Angio-Seal closure device without complications. No hematoma or oozing noted. Distal R lower extremity pulses remain unchanged post hemostasis. Sterile dressing applied over the puncture site. Anesthesia:General    Estimated Blood Loss:  30 ml. Specimens:  None    Implants:   8 mm x 40 mm Precise stent    Apparent Intraoperative Complications:  None immediate    Patient Condition:  Stable    Disposition:  Intensive care unit    Attestation:  I performed the procedure.         Signed By: Stephen Gonzalez MD     March 2, 2023     [address] [facility] [place]

## 2023-03-03 ENCOUNTER — APPOINTMENT (OUTPATIENT)
Dept: GENERAL RADIOLOGY | Age: 73
DRG: 023 | End: 2023-03-03
Attending: HOSPITALIST
Payer: MEDICARE

## 2023-03-03 LAB
ANION GAP SERPL CALC-SCNC: 4 MMOL/L (ref 5–15)
BUN SERPL-MCNC: 24 MG/DL (ref 6–20)
BUN/CREAT SERPL: 21 (ref 12–20)
CALCIUM SERPL-MCNC: 8.3 MG/DL (ref 8.5–10.1)
CHLORIDE SERPL-SCNC: 111 MMOL/L (ref 97–108)
CO2 SERPL-SCNC: 24 MMOL/L (ref 21–32)
CREAT SERPL-MCNC: 1.13 MG/DL (ref 0.7–1.3)
ERYTHROCYTE [DISTWIDTH] IN BLOOD BY AUTOMATED COUNT: 12.9 % (ref 11.5–14.5)
GLUCOSE SERPL-MCNC: 124 MG/DL (ref 65–100)
HCT VFR BLD AUTO: 38 % (ref 36.6–50.3)
HGB BLD-MCNC: 12.6 G/DL (ref 12.1–17)
MAGNESIUM SERPL-MCNC: 2.5 MG/DL (ref 1.6–2.4)
MCH RBC QN AUTO: 32 PG (ref 26–34)
MCHC RBC AUTO-ENTMCNC: 33.2 G/DL (ref 30–36.5)
MCV RBC AUTO: 96.4 FL (ref 80–99)
NRBC # BLD: 0 K/UL (ref 0–0.01)
NRBC BLD-RTO: 0 PER 100 WBC
PHOSPHATE SERPL-MCNC: 3.7 MG/DL (ref 2.6–4.7)
PLATELET # BLD AUTO: 152 K/UL (ref 150–400)
PMV BLD AUTO: 9.5 FL (ref 8.9–12.9)
POTASSIUM SERPL-SCNC: 4.5 MMOL/L (ref 3.5–5.1)
RBC # BLD AUTO: 3.94 M/UL (ref 4.1–5.7)
SODIUM SERPL-SCNC: 139 MMOL/L (ref 136–145)
WBC # BLD AUTO: 5.8 K/UL (ref 4.1–11.1)

## 2023-03-03 PROCEDURE — 80048 BASIC METABOLIC PNL TOTAL CA: CPT

## 2023-03-03 PROCEDURE — 74011250637 HC RX REV CODE- 250/637: Performed by: INTERNAL MEDICINE

## 2023-03-03 PROCEDURE — 71045 X-RAY EXAM CHEST 1 VIEW: CPT

## 2023-03-03 PROCEDURE — 97530 THERAPEUTIC ACTIVITIES: CPT

## 2023-03-03 PROCEDURE — 92526 ORAL FUNCTION THERAPY: CPT | Performed by: SPEECH-LANGUAGE PATHOLOGIST

## 2023-03-03 PROCEDURE — 97112 NEUROMUSCULAR REEDUCATION: CPT

## 2023-03-03 PROCEDURE — 97164 PT RE-EVAL EST PLAN CARE: CPT

## 2023-03-03 PROCEDURE — 74011250637 HC RX REV CODE- 250/637: Performed by: NURSE PRACTITIONER

## 2023-03-03 PROCEDURE — 74011250636 HC RX REV CODE- 250/636: Performed by: ANESTHESIOLOGY

## 2023-03-03 PROCEDURE — 74011000250 HC RX REV CODE- 250: Performed by: NURSE PRACTITIONER

## 2023-03-03 PROCEDURE — 74011000250 HC RX REV CODE- 250: Performed by: HOSPITALIST

## 2023-03-03 PROCEDURE — 92507 TX SP LANG VOICE COMM INDIV: CPT | Performed by: SPEECH-LANGUAGE PATHOLOGIST

## 2023-03-03 PROCEDURE — 97168 OT RE-EVAL EST PLAN CARE: CPT

## 2023-03-03 PROCEDURE — 85027 COMPLETE CBC AUTOMATED: CPT

## 2023-03-03 PROCEDURE — 36415 COLL VENOUS BLD VENIPUNCTURE: CPT

## 2023-03-03 PROCEDURE — 51798 US URINE CAPACITY MEASURE: CPT

## 2023-03-03 PROCEDURE — 94660 CPAP INITIATION&MGMT: CPT

## 2023-03-03 PROCEDURE — 74011250636 HC RX REV CODE- 250/636: Performed by: NURSE PRACTITIONER

## 2023-03-03 PROCEDURE — 74011250637 HC RX REV CODE- 250/637

## 2023-03-03 PROCEDURE — 84100 ASSAY OF PHOSPHORUS: CPT

## 2023-03-03 PROCEDURE — 65270000046 HC RM TELEMETRY

## 2023-03-03 PROCEDURE — 83735 ASSAY OF MAGNESIUM: CPT

## 2023-03-03 PROCEDURE — 74011000250 HC RX REV CODE- 250: Performed by: ANESTHESIOLOGY

## 2023-03-03 RX ORDER — BUMETANIDE 0.25 MG/ML
1 INJECTION INTRAMUSCULAR; INTRAVENOUS EVERY 12 HOURS
Status: DISCONTINUED | OUTPATIENT
Start: 2023-03-03 | End: 2023-03-07

## 2023-03-03 RX ORDER — TAMSULOSIN HYDROCHLORIDE 0.4 MG/1
0.4 CAPSULE ORAL DAILY
Status: DISCONTINUED | OUTPATIENT
Start: 2023-03-03 | End: 2023-03-07 | Stop reason: HOSPADM

## 2023-03-03 RX ADMIN — BUMETANIDE 1 MG: 0.25 INJECTION INTRAMUSCULAR; INTRAVENOUS at 14:25

## 2023-03-03 RX ADMIN — TAMSULOSIN HYDROCHLORIDE 0.4 MG: 0.4 CAPSULE ORAL at 01:11

## 2023-03-03 RX ADMIN — SODIUM CHLORIDE, PRESERVATIVE FREE 10 ML: 5 INJECTION INTRAVENOUS at 14:07

## 2023-03-03 RX ADMIN — LISINOPRIL 40 MG: 20 TABLET ORAL at 09:01

## 2023-03-03 RX ADMIN — SODIUM CHLORIDE, PRESERVATIVE FREE 10 ML: 5 INJECTION INTRAVENOUS at 22:08

## 2023-03-03 RX ADMIN — LABETALOL HYDROCHLORIDE 20 MG: 5 INJECTION, SOLUTION INTRAVENOUS at 14:07

## 2023-03-03 RX ADMIN — SODIUM CHLORIDE, PRESERVATIVE FREE 10 ML: 5 INJECTION INTRAVENOUS at 06:17

## 2023-03-03 RX ADMIN — ENOXAPARIN SODIUM 30 MG: 100 INJECTION SUBCUTANEOUS at 22:08

## 2023-03-03 RX ADMIN — HYDRALAZINE HYDROCHLORIDE 20 MG: 20 INJECTION INTRAMUSCULAR; INTRAVENOUS at 11:33

## 2023-03-03 RX ADMIN — ASPIRIN 81 MG CHEWABLE TABLET 324 MG: 81 TABLET CHEWABLE at 09:01

## 2023-03-03 RX ADMIN — CLOPIDOGREL BISULFATE 75 MG: 75 TABLET ORAL at 09:01

## 2023-03-03 RX ADMIN — ENOXAPARIN SODIUM 30 MG: 100 INJECTION SUBCUTANEOUS at 09:02

## 2023-03-03 RX ADMIN — ATORVASTATIN CALCIUM 40 MG: 40 TABLET, FILM COATED ORAL at 22:08

## 2023-03-03 RX ADMIN — BUMETANIDE 1 MG: 0.25 INJECTION INTRAMUSCULAR; INTRAVENOUS at 22:08

## 2023-03-03 NOTE — PROGRESS NOTES
Problem: Mobility Impaired (Adult and Pediatric)  Goal: *Acute Goals and Plan of Care (Insert Text)  Description: FUNCTIONAL STATUS PRIOR TO ADMISSION: Patient was independent and active without use of DME. 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 Wears CPAP at night. History of prior concussions from horse racing. Retired. HOME SUPPORT PRIOR TO ADMISSION: The patient lived with spouse but did not require assist.    Physical Therapy Goals  Goals re-evaluated 3/3/2023   1. Patient will move from supine to sit and sit to supine, scoot up and down, and roll side to side in bed with SBA  within 7 day(s). 2.  Patient will transfer from bed to chair and chair to bed with CGA using the least restrictive device within 7 day(s). 3.  Patient will perform sit to stand with CGA within 7 day(s). 4.  Patient will ambulate with Brooke for 20 feet with the least restrictive device within 7 day(s). 5.  Patient will improve Santoro Balance score by 7 points within 7 days. Initiated 2/27/2023  1. Patient will move from supine to sit and sit to supine, scoot up and down, and roll side to side in bed with moderate assistance  within 7 day(s). 2.  Patient will transfer from bed to chair and chair to bed with maximal assistance using the least restrictive device within 7 day(s). 3.  Patient will perform sit to stand with moderate assistance within 7 day(s). 4.  Patient will ambulate with maximal assistance for 5 feet with the least restrictive device within 7 day(s). 5.  Patient will improve Santoro Balance score by 7 points within 7 days. Outcome: Progressing Towards Goal  PHYSICAL THERAPY REEVALUATION  Patient: Frosty Sprain III (71 y.o. male)  Date: 3/3/2023  Primary Diagnosis: Stroke [facility]) [I63.9]       Precautions: Fall (-140)      ASSESSMENT  Pt re-evaluated by PT s/p LICA stenting and is progressing well toward goals.   Presents with R hemiparesis (RLE 3/5), impaired coordination, R inattention, impaired balance, aphasia, impulsivity, decreased insight into deficits, decreased safety awareness, and overall decline in functional mobility. Pt eager to participate with therapy and transferred supine>sit with Brooke, sit<>stand with Brooke, and transferred bed>chair with Brooke. Ended session in chair and left with all needs met and nursing updated. Recommending IPR upon discharge. Current Level of Function Impacting Discharge (mobility/balance): Brooke for bed>chair transfer    Other factors to consider for discharge: aphasic, fall risk, independent baseline      Patient will benefit from skilled therapy intervention to address the above noted impairments. PLAN :  Recommendations and Planned Interventions: bed mobility training, transfer training, gait training, therapeutic exercises, neuromuscular re-education, patient and family training/education, and therapeutic activities      Frequency/Duration: Patient will be followed by physical therapy:  5 times a week to address goals. Recommendation for discharge: (in order for the patient to meet his/her long term goals)  Therapy 3 hours per day 5-7 days per week    This discharge recommendation:  Has not yet been discussed the attending provider and/or case management    Equipment recommendations for successful discharge (if) home: TBD         SUBJECTIVE:   Patient stated Tano Obey.  perseverative on saying  yeah but did say no once     OBJECTIVE DATA SUMMARY:   HISTORY:    Past Medical History:   Diagnosis Date    Arthritis     Chronic kidney disease     Chronic pain     Hx of mixed hyperlipidemia     Hypertension     Impaired fasting glucose     Sleep apnea     Stroke ([facility] [address]     Thromboembolus [facility])      Past Surgical History:   Procedure Laterality Date    HX COLONOSCOPY  06/13/2019    HX WISDOM TEETH EXTRACTION Bilateral 2021    all teeth pulled    IR PERC ART [address] INTRACRANIAL  02/26/2023     Hospital course since last seen and reason for reevaluation: Pt re-evaluated by PT s/p LICA stenting    Home Situation  Home Environment: Private residence  # Steps to Enter: 4  Rails to Enter: Yes  Hand Rails : Bilateral  One/Two Story Residence: One story  Living Alone: No  Support Systems: Spouse/Significant Other  Patient Expects to be Discharged to[de-identified] Home  Current DME Used/Available at Home: None  Tub or Shower Type: Tub/Shower combination    EXAMINATION/PRESENTATION/DECISION MAKING:   Critical Behavior:  Neurologic State: Alert  Orientation Level: Oriented to person (via y/n)  Cognition: Decreased command following, Decreased attention/concentration  Safety/Judgement: Lack of insight into deficits  Hearing: Auditory  Auditory Impairment: None    Range Of Motion:  AROM: Generally decreased, functional (mild-moderate R hemiparesis)                       Strength:    Strength: Generally decreased, functional (mild-moderate R hemiparesis)                    Tone & Sensation:   Tone: Abnormal (RUE hypotonic)              Sensation:  (unable to assess)               Coordination:  Coordination: Generally decreased, functional (except RUE limited by distal weakness)  Vision:   Tracking: Able to track stimulus in all quadrants w/o difficulty  Visual Fields:  (intact, using L hand to copy # of fingers d/t aphasia)  Functional Mobility:  Bed Mobility:  Supine to Sit: Minimum assistance    Transfers:  Sit to Stand: Minimum assistance  Stand to Sit: Minimum assistance  Bed to Chair: Minimum assistance    Balance:   Sitting: Intact  Standing: Impaired; With support  Standing - Static: Good  Standing - Dynamic : Fair    Activity Tolerance:   Fair    After treatment patient left in no apparent distress:   Sitting in chair, Call bell within reach, and Bed / chair alarm activated    COMMUNICATION/EDUCATION:   The patients plan of care was discussed with: Occupational therapist and Registered nurse.      Fall prevention education was provided and the patient/caregiver indicated understanding., Patient/family have participated as able in goal setting and plan of care. , and Patient/family agree to work toward stated goals and plan of care.     Thank you for this referral.  Dana Delatorre, PT, DPT   Time Calculation: 12 mins

## 2023-03-03 NOTE — PROGRESS NOTES
Bedside and Verbal shift change report given to Divya Cope RN (oncoming nurse) by Amanda Guevara (offgoing nurse). Report included the following information SBAR, Kardex, ED Summary, Procedure Summary, Intake/Output, MAR, Recent Results, Cardiac Rhythm normal sinus/sinus holden, and Dual Neuro Assessment.

## 2023-03-03 NOTE — H&P
[address] [facility] Adult  Hospitalist Group     ICU Transfer/Accept Summary     This patient is being transferred OUT OF THE ICU  DATE OF TRANSFER: 3/3/2023       PATIENT ID: Tad Hopson III  MRN: [id number]   YOB: 1950    PRIMARY CARE PROVIDER: None   DATE OF ADMISSION: 2/26/2023 10:35 AM    ATTENDING PHYSICIAN: Gaurav Leonard MD  CONSULTATIONS:   IP CONSULT TO NEUROINTERVENTIONAL SURGERY  IP CONSULT TO NEUROLOGY    PROCEDURES/SURGERIES:   * No surgery found *    REASON FOR ADMISSION: <principal problem not specified>     HOSPITAL PROBLEM LIST:  Patient Active Problem List   Diagnosis Code    Stroke [facility]) I63.9             Brief HPI and Hospital Course:    \"Junito Harris III is a 81 y.o. male with PMH significant for HTN, pre-diabetes, and high cholesterol who initially presented to [facility] 2/27/23 with right-sided weakness and aphasia. The wife reported that he was fine the night before. The next morning around 6:30 am she noticed that the TV was still on in the living room and found the patient on the floor with difficulty talking. 911 was called and the patient was brought in to the ED as a stroke alert. CT of Head showed a left MCA territory infarct and probable MCA thrombus. No intracranial hemorrhage. CTA of H/N showed occlusion of the distal left MCA segment and severe stenosis of left cervical ICA. Mild stenosis right cervical ICA. NIS consulted and the patient was deemed a candidate for mechanical thrombectomy. He was not a candidate for TNK due to LKW time. He is not taking any blood thinners at home. He was transferred to [facility] for neurointervention. He was emergently taken to the angio suite for thrombectomy. NIHSS 18 prior to procedure. He has no prior history of a stroke. \"    Subjective/interval history  -Patient seen and examined in the ICU bed #11. He is currently sitting in the chair by the bedside, on room air. He is alert oriented.   Speech is slurred and somewhat cough but denied shortness of breath, chest pain or fever. Son at the bedside, updated. His son is going to visit [facility]. Assessment and Plan:  Acute ischemic stroke due to left MCA occlusion with associated hemorrhagic conversion. Severe L ICA stenosis  -Status post left MCA thrombectomy 2/26  -Status post left ICA angioplasty with stenting on 3/2  -Continue aspirin, Plavix and atorvastatin. -PT and OT evaluated, rehab recommended.  -Neurologist, NIS recommendations appreciated  -Echocardiogram with normal EF, 60-65%. Normal left ventricular size, wall thickness, wall motion. Nondiagnostic bubble study however no bubbles seen in the left-sided chambers.  -Neurochecks per protocol. Hypertension, controlled. Off Cardene drip. Continue lisinopril. Peripheral edema, wet cough  -Check CXR              Status: Full  DVT PPX: Lovenox  Discharge disposition: Home  Cardiac monitoring: Telemetry/NSTU           Social Determinants   Social Determinants of Health     Tobacco Use: Low Risk     Smoking Tobacco Use: Never    Smokeless Tobacco Use: Never    Passive Exposure: Never   Alcohol Use: Not on file   Financial Resource Strain: Not on file   Food Insecurity: Not on file   Transportation Needs: Not on file   Physical Activity: Not on file   Stress: Not on file   Social Connections: Not on file   Intimate Partner Violence: Not on file   Depression: Not on file   Housing Stability: Not on file      PHYSICAL EXAMINATION:  I attest that I have personally performed below mentioned exam on 3/3/2023  Visit Vitals  /65   Pulse 62   Temp 98 °F (36.7 °C)   Resp 19   Ht 5' 8\" (1.727 m)   Wt 149.9 kg (330 lb 7.5 oz)   SpO2 96%   BMI 50.25 kg/m²       General:          Sitting in a chair by the bedside. Slurred speech. Alert and oriented  HEENT:           Atraumatic, MMM. Left eye erythematous with discharge.            Neck:               Supple, symmetrical,  thyroid: non tender  Lungs: Clear to auscultation bilaterally. No Wheezing or Rhonchi. No rales. Heart:              Regular  rhythm,  No  murmur   No edema  Abdomen:       Obese. Soft, non-tender. Not distended. Bowel sounds normal  Extremities:     Pitting edema on the hands and the legs. Skin:                Not pale. Not Jaundiced  No rashes   Psych:             Not anxious or agitated. Neurologic:      Alert, moves all extremities, oriented X 3. Speech is slurred. Right hemiparesis. Notes reviewed from all clinical/nonclinical/nursing services involved in patient's clinical care. Care coordination discussions were held with appropriate clinical/nonclinical/ nursing providers based on care coordination needs. Patients current active Medications were reviewed, considered, added and adjusted based on the clinical condition today. Home Medications were reconciled to the best of my ability given all available resources at the time of admission. Route is PO if not otherwise noted.     Social Determinants of Health     Tobacco Use: Low Risk     Smoking Tobacco Use: Never    Smokeless Tobacco Use: Never    Passive Exposure: Never   Alcohol Use: Not on file   Financial Resource Strain: Not on file   Food Insecurity: Not on file   Transportation Needs: Not on file   Physical Activity: Not on file   Stress: Not on file   Social Connections: Not on file   Intimate Partner Violence: Not on file   Depression: Not on file   Housing Stability: Not on file        Current Facility-Administered Medications   Medication Dose Route Frequency    tamsulosin (FLOMAX) capsule 0.4 mg  0.4 mg Oral DAILY    lisinopriL (PRINIVIL, ZESTRIL) tablet 40 mg  40 mg Oral DAILY    aspirin chewable tablet 324 mg  324 mg Oral DAILY    clopidogreL (PLAVIX) tablet 75 mg  75 mg Oral DAILY    labetaloL (NORMODYNE;TRANDATE) injection 20 mg  20 mg IntraVENous Q2H PRN    bacitracin 500 unit/gram packet 1 Packet  1 Packet Topical PRN    alteplase (CATHFLO) 1 mg in sterile water (preservative free) 1 mL injection  1 mg InterCATHeter PRN    atorvastatin (LIPITOR) tablet 40 mg  40 mg Oral QHS    sodium chloride (NS) flush 5-40 mL  5-40 mL IntraVENous Q8H    sodium chloride (NS) flush 5-40 mL  5-40 mL IntraVENous PRN    acetaminophen (TYLENOL) tablet 650 mg  650 mg Oral Q6H PRN    Or    acetaminophen (TYLENOL) suppository 650 mg  650 mg Rectal Q6H PRN    polyethylene glycol (MIRALAX) packet 17 g  17 g Oral DAILY PRN    ondansetron (ZOFRAN ODT) tablet 4 mg  4 mg Oral Q8H PRN    Or    ondansetron (ZOFRAN) injection 4 mg  4 mg IntraVENous Q6H PRN    sodium chloride (NS) flush 5-40 mL  5-40 mL IntraVENous Q8H    sodium chloride (NS) flush 5-40 mL  5-40 mL IntraVENous PRN    enoxaparin (LOVENOX) injection 30 mg  30 mg SubCUTAneous Q12H    niCARdipine (CARDENE) 25 mg in 0.9% sodium chloride 250 mL ([id number])  0-15 mg/hr IntraVENous TITRATE    hydrALAZINE (APRESOLINE) 20 mg/mL injection 20 mg  20 mg IntraVENous Q6H PRN         Labs:     Recent Labs     03/03/23  0444 03/02/23  0501   WBC 5.8 6.9   HGB 12.6 13.6   HCT 38.0 40.9    172     Recent Labs     03/03/23  0444 03/02/23  0501 03/01/23  0324    143 143   K 4.5 3.8 3.8   * 114* 113*   CO2 24 24 21   BUN 24* 26* 22*   CREA 1.13 1.19 1.17   * 113* 101*   CA 8.3* 8.1* 8.1*   MG 2.5* 2.3 2.3   PHOS 3.7 3.3 2.7     No results for input(s): ALT, AP, TBIL, TBILI, TP, ALB, GLOB, GGT, AML, LPSE in the last 72 hours. No lab exists for component: SGOT, GPT, AMYP, HLPSE  No results for input(s): INR, PTP, APTT, INREXT in the last 72 hours. No results for input(s): FE, TIBC, PSAT, FERR in the last 72 hours. No results found for: FOL, RBCF   No results for input(s): PH, PCO2, PO2 in the last 72 hours. No results for input(s): CPK, CKNDX, TROIQ in the last 72 hours.     No lab exists for component: CPKMB  Lab Results   Component Value Date/Time    Cholesterol, total 178 02/26/2023 02:49 PM    HDL Cholesterol 42 02/26/2023 02:49 PM    LDL, calculated 107.2 (H) 02/26/2023 02:49 PM    Triglyceride 144 02/26/2023 02:49 PM    CHOL/HDL Ratio 4.2 02/26/2023 02:49 PM     No results found for: GLUCPOC  No results found for: COLOR, APPRN, SPGRU, REFSG, CHETAN, PROTU, GLUCU, Amanda Rile, BILU, UROU, CANDICE, LEUKU, GLUKE, EPSU, BACTU, WBCU, RBCU, CASTS, UCRY       CODE STATUS:  x Full Code    DNR    Partial    Comfort Care         Admission Status: Inpatient          Signed:    Catalina Delatorre MD  Date of Service:  3/3/2023  10:55 AM

## 2023-03-03 NOTE — PROGRESS NOTES
1930: Bedside and Verbal shift change report given to Gordon, Via Thomas Bullard, student nurse (oncoming nurse) by Carmela Reed (offgoing nurse). Report included the following information SBAR, Kardex, Procedure Summary, Intake/Output, MAR, Recent Results, Cardiac Rhythm NSR, Alarm Parameters , and Dual Neuro Assessment. 0445: Bladder scanned pt for 645mL. Fransico Meyers NP notified. Straight cath performed. 0730: Bedside and Verbal shift change report given to Stephanie Donovan RN (oncoming nurse) by Joseph Swanson, student nurse (offgoing nurse). Report included the following information SBAR, Kardex, Intake/Output, MAR, Recent Results, Cardiac Rhythm SB, Alarm Parameters , and Dual Neuro Assessment.

## 2023-03-03 NOTE — PROGRESS NOTES
Problem: Dysphagia (Adult)  Goal: *Acute Goals and Plan of Care (Insert Text)  Description: Speech Therapy Goals  Initiated 2/27/23    1. Patient will tolerate baseline diet without adverse effects within 7 days. Outcome: Progressing Towards Goal     Problem: Communication Impaired (Adult)  Goal: *Acute Goals and Plan of Care (Insert Text)  Description: Speech Therapy Goals  Initiated 3/3/2023   1. Patient will complete automatic speech tasks with 50% accuracy with mod cues within 7 days   2. Patient will complete simple naming (confrontation and responsive) tasks with 25% accuracy with max cues within 7 days   3. Patient will complete simple sentence completion tasks with 25% accuracy with max cues within 7 days   4. Patient will follow 1-step commands with 90% accuracy with min cues within 7 days   5. Patient will answer simple y/n questions with 90% accuracy with min cues within 7 days     Initiated 2/27/23    1. Patient will participate in further diagnostic treatment regarding language. MET 3/3/2023   Outcome: Progressing Towards Goal       SPEECH LANGUAGE PATHOLOGY DYSPHAGIA AND SPEECH TREATMENT  Patient: Royal Hubbard III [de-identified]81 y.o. male)  Date: 3/3/2023  Diagnosis: Stroke [facility]) [I63.9] <principal problem not specified>      Precautions:  Fall (-140)    ASSESSMENT:  Patient with good tolerance of PO with mildly delayed mastication but full and timely oral clearance. Tolerated thin liquids including successive straw sips thins without any s/s of aspiration. Appropriate for upgrade to easy to chew diet. Continues with moderate receptive and severe expressive aphasia, however, excellent participation in treatment with ability to verbalize with automatic speech tasks including singing and rote counting with tactile cues (tapping and repetition with therapist fading out). He benefits from continued intensive SLP treatment to address functional communication.   Excellent candidate for inpatient rehab to increase ability to make functional wants and needs known in a safe and timely manner. PLAN:  Recommendations and Planned Interventions:  --Recommend upgrade to easy to chew/thin liquid diet  --will follow for diet tolerance and language treatment   Patient continues to benefit from skilled intervention to address the above impairments. Continue treatment per established plan of care. Discharge Recommendations:  Inpatient Rehab     SUBJECTIVE:   Patient stated Jakea Azerbaijani. Perseverative on this throughout session     OBJECTIVE:   Cognitive and Communication Status:  Neurologic State: Alert  Orientation Level: Oriented to person (via y/n)  Cognition: Decreased command following, Decreased attention/concentration  Perception: Appears intact  Perseveration: Perseverates during conversation  Safety/Judgement: Lack of insight into deficits    Dysphagia Treatment and Interventions:  Oral Assessment:     P.O. Trials:  Patient Position: upright in bed  Vocal quality prior to P.O.: No impairment  Consistency Presented: Thin liquid; Solid  How Presented: SLP-fed/presented;Straw;Successive swallows     Bolus Acceptance: No impairment  Bolus Formation/Control: Impaired  Type of Impairment: Delayed;Mastication (slow but thorough)  Propulsion: No impairment  Oral Residue: None  Initiation of Swallow: No impairment  Laryngeal Elevation: Functional  Aspiration Signs/Symptoms: None  Pharyngeal Phase Characteristics: No impairment, issues, or problems              Oral Phase Severity: Mild  Pharyngeal Phase Severity : No impairment  Exercises:  Laryngeal Exercises:                                                                                                                                     Speech Treatment and Interventions: Motor Speech:                       Speech Characteristics: Perseveration; Word retrieval (says \"yeah\")        Interfering Components: Limited error awareness  Language Comprehension and Expression:  Auditory Comprehension   Response to Basic Yes/No Questions (%): 50 %  One-Step Basic Commands (%): 75 %  Two-Step Basic Commands (%): 0 %  Verbal Expression  Verbal Expression  Automatic Speech Task: Impaired (comment) (counting 1-10 30%, singing happy birthday - able to produce accurate words 25%; opposites 0%)  Naming: Impaired  Confrontation (%): 0 %  Responsive (%): 0 %  Sentence Completion: Impaired  Word level (%): 0 %  Conversation: Non-fluent  Speech Characteristics: Perseveration; Word retrieval (says \"yeah\")  Interfering Components: Limited error awareness  Effective Techniques: Word retrieval strategies;Provide extra time  Overall Impairment: Severe (expressive, moderate receptive)  Neuro-Linguistics:                                                  Voice:          Response & Tolerance to Activities:         Pain:  Pain Scale 1: Numeric (0 - 10)  Pain Intensity 1: 0       After treatment:   Patient left in no apparent distress in bed, Call bell within reach, and Nursing notified    COMMUNICATION/EDUCATION:   Patient was educated regarding his deficit(s) of dysphagia, aphasia as this relates to his diagnosis of CVA. He demonstrated Guarded understanding as evidenced by limited response to education. The patient's plan of care including recommendations, planned interventions, and recommended diet changes were discussed with: Registered nurse. Christal Cano M.CD.  CCC-SLP   Time Calculation: 25 mins

## 2023-03-03 NOTE — PROGRESS NOTES
Neurointerventional Surgery Progress Note  Neurocritical Care NP      Admit Date: 2/26/2023   LOS: 5 days      Daily Progress Note: 3/3/2023    Assessment:   Acute Left MCA Ischemic CVA due to left MCA occlusion with associated hemorrhagic conversion, s/p left MCA thrombectomy 0/12/97, severe LICA stenosis- LICA angioplasty with stenting 3/2 by Dr. Mary Jane Lovelace.     Plan:      - Cont DAPT with  mg and Plavix 75 mg daily   - P2Y12 and aspirin assays are therapeutic, 186 and 402 3/2  - Lipid panel shows .2, continue Lipitor 40 mg QHS  - SBP goal 100-140 post stenting, cardene gtt as needed  - Q4 hour Neuro checks  - Neurology signed off    Plan d/w Dr. Mary Jane Lovelace and Dr Katty Carrion. Neuro exam improving, able to downgrade to NSTU. Initiate discharge planning. HPI:Junito Isidro III is a 81 y.o. male with PMH significant for HTN, pre-diabetes, and high cholesterol who initially presented to [facility] 2/27/23 with right-sided weakness and aphasia. The wife reported that he was fine the night before. The next morning around 6:30 am she noticed that the TV was still on in the living room and found the patient on the floor with difficulty talking. 911 was called and the patient was brought in to the ED as a stroke alert. CT of Head showed a left MCA territory infarct and probable MCA thrombus. No intracranial hemorrhage. CTA of H/N showed occlusion of the distal left MCA segment and severe stenosis of left cervical ICA. Mild stenosis right cervical ICA. NIS consulted and the patient was deemed a candidate for mechanical thrombectomy. He was not a candidate for TNK due to LKW time. He is not taking any blood thinners at home. He was transferred to [facility] for neurointervention. He was emergently taken to the angio suite for thrombectomy. NIHSS 18 prior to procedure. He has no prior history of a stroke. Subjective:     No neuro events overnight. Diet upgraded to dysphagia soft/bite sized.  Unable to obtain meaningful ROS due to aphasia, still with repetitive \"yes\" speech.      Current Facility-Administered Medications   Medication Dose Route Frequency Provider Last Rate Last Admin    tamsulosin (FLOMAX) capsule 0.4 mg  0.4 mg Oral DAILY Yadira Medrano NP   0.4 mg at 23 0111    lisinopriL (PRINIVIL, ZESTRIL) tablet 40 mg  40 mg Oral DAILY Nancy Kaufman MD        aspirin chewable tablet 324 mg  324 mg Oral DAILY Maritza Alba, NP   324 mg at 23 7373    clopidogreL (PLAVIX) tablet 75 mg  75 mg Oral DAILY Maritza Alba, NP   75 mg at 23 1401    labetaloL (NORMODYNE;TRANDATE) injection 20 mg  20 mg IntraVENous Q2H PRN Yadira Medrano NP        bacitracin 500 unit/gram packet 1 Packet  1 Packet Topical PRN Nancy Kaufman MD        alteplase (CATHFLO) 1 mg in sterile water (preservative free) 1 mL injection  1 mg InterCATHeter PRN Nancy Kaufman MD   1 mg at 23 1808    atorvastatin (LIPITOR) tablet 40 mg  40 mg Oral QHS DWAYNE Jacobson   40 mg at 23 2201    sodium chloride (NS) flush 5-40 mL  5-40 mL IntraVENous Q8H Norma Haile MD   10 mL at 23 0617    sodium chloride (NS) flush 5-40 mL  5-40 mL IntraVENous PRN Norma Haile MD        acetaminophen (TYLENOL) tablet 650 mg  650 mg Oral Q6H PRN Norma Haile MD   650 mg at 23 1833    Or    acetaminophen (TYLENOL) suppository 650 mg  650 mg Rectal Q6H PRN Norma Haile MD   650 mg at 23 0438    polyethylene glycol (MIRALAX) packet 17 g  17 g Oral DAILY PRN Norma Haile MD        ondansetron (ZOFRAN ODT) tablet 4 mg  4 mg Oral Q8H PRN Norma Haile MD        Or    ondansetron (ZOFRAN) injection 4 mg  4 mg IntraVENous Q6H PRN Norma Haile MD        sodium chloride (NS) flush 5-40 mL  5-40 mL IntraVENous Q8H Jaime Mccarthy, NP   10 mL at 23 0617    sodium chloride (NS) flush 5-40 mL  5-40 mL IntraVENous PRN Jaime Mccarthy, NP        enoxaparin (LOVENOX) injection 30 mg  30 mg SubCUTAneous Q12H Brandi Nowak MD   30 mg at 23    niCARdipine (CARDENE) 25 mg in 0.9% sodium chloride 250 mL ([id number])  0-15 mg/hr IntraVENous TITRATE Ingrid Mccarthy NP   Stopped at 23 1100    hydrALAZINE (APRESOLINE) 20 mg/mL injection 20 mg  20 mg IntraVENous Q6H PRN Ingrid Mccarthy NP   20 mg at 23 2249        No Known Allergies    Review of Systems:  Review of systems not obtained due to patient factors. Objective:     Vital signs  Temp (24hrs), Av.2 °F (36.8 °C), Min:97.5 °F (36.4 °C), Max:98.6 °F (37 °C)   No intake/output data recorded.  1901 -  0700  In:  [P.O.:400; I.V.:1595]  Out:  [Urine:1750]    Visit Vitals  /65   Pulse (!) 47   Temp 98.6 °F (37 °C)   Resp 13   Ht 5' 8\" (1.727 m)   Wt 149.9 kg (330 lb 7.5 oz)   SpO2 94%   BMI 50.25 kg/m²    O2 Flow Rate (L/min): 2 l/min O2 Device: CPAP mask       Intake/Output Summary (Last 24 hours) at 3/3/2023 0817  Last data filed at 3/3/2023 0500  Gross per 24 hour   Intake 400 ml   Output 1450 ml   Net -1050 ml        Physical Exam:  GENERAL: Calm, cooperative, adult  male  SKIN: Skin warm and dry to touch. Appropriate for ethnicity. Neurologic Exam:  Mental Status:             Alert, unable to assess orientation due to aphasia. Repetitive speech, \"yes\" and \"yes\". Language:                   Severe expressive aphasia and some receptive aphasia. Repeats yes, does not answer questions appropriately. Will show a thumbs up or 2 fingers but with frequent coaching. Cranial Nerves:                                                 Pupils 3 mm, equal, round and briskly reactive to light. No blink to threat in right eye. Blinks to threat in left eye. Right-sided visual neglect.                                        EOMs intact, tracks examiner around room but will not follow to command                                      Mild right facial droop. Motor:                          Right-sided weakness present. RUE and RLE drift present. Moves LUE and LLE with no drift. No involuntary movements. Sensation:                   ALEKSEY due to aphasia. Withdraws to pain in all extremities. Coordination & Gait:   Unable to follow commands well enough to perform FTN or HTS. Gait deferred. 24 hour results:  Recent Results (from the past 12 hour(s))   CBC W/O DIFF    Collection Time: 03/03/23  4:44 AM   Result Value Ref Range    WBC 5.8 4.1 - 11.1 K/uL    RBC 3.94 (L) 4.10 - 5.70 M/uL    HGB 12.6 12.1 - 17.0 g/dL    HCT 38.0 36.6 - 50.3 %    MCV 96.4 80.0 - 99.0 FL    MCH 32.0 26.0 - 34.0 PG    MCHC 33.2 30.0 - 36.5 g/dL    RDW 12.9 11.5 - 14.5 %    PLATELET 748 603 - 270 K/uL    MPV 9.5 8.9 - 12.9 FL    NRBC 0.0 0  WBC    ABSOLUTE NRBC 0.00 0.00 - 0.32 K/uL   METABOLIC PANEL, BASIC    Collection Time: 03/03/23  4:44 AM   Result Value Ref Range    Sodium 139 136 - 145 mmol/L    Potassium 4.5 3.5 - 5.1 mmol/L    Chloride 111 (H) 97 - 108 mmol/L    CO2 24 21 - 32 mmol/L    Anion gap 4 (L) 5 - 15 mmol/L    Glucose 124 (H) 65 - 100 mg/dL    BUN 24 (H) 6 - 20 MG/DL    Creatinine 1.13 0.70 - 1.30 MG/DL    BUN/Creatinine ratio 21 (H) 12 - 20      eGFR >60 >60 ml/min/1.73m2    Calcium 8.3 (L) 8.5 - 10.1 MG/DL   MAGNESIUM    Collection Time: 03/03/23  4:44 AM   Result Value Ref Range    Magnesium 2.5 (H) 1.6 - 2.4 mg/dL   PHOSPHORUS    Collection Time: 03/03/23  4:44 AM   Result Value Ref Range    Phosphorus 3.7 2.6 - 4.7 MG/DL     Imaging:  CTH: CT evidence of left MCA territory infarct and probable MCA thrombus. No intracranial hemorrhage. CTA: Occlusion of distal M1 segment of left MCA. Left MCA territory infarction. Severe stenosis left cervical ICA. Mild stenosis right cervical ICA. MRI: Acute left MCA territory infarcts with associated hemorrhagic transformation.     Bilateral carotid duplex 2/26/23: Consistent with less than 50% stenosis of the right internal carotid artery. Consistent with greater than 70% stenosis of the left internal carotid artery.     Blayne Coombs NP  Neurocritical Care Nurse Practitioner

## 2023-03-03 NOTE — PROGRESS NOTES
Critical care progress note. Sleeps with CPAP, quite comfortable. Wakes up comfortable. Nods, tries to talk, aphasic. Follows commands. He had to be straight cathed 3 times since yesterday evening with urine amounts between 450 and 800. Review of system could not be performed. Medications in the hospital reviewed. On examination he is in no distress vitals and noted, blood pressure within normal parameters. Head examination reveals no conjunctival pallor. Mucous membranes are moist.  The neck was supple obese, no stridor. Chest was clear with good air entry bilaterally. Heart sounds were faint regular S1 and S2. Lower extremities showed bilateral pitting edema between mild to moderate. Neurologically, awake, cannot assess orientation. Right upper and lower extremities weaker than left, on the left side motor power upper and lower extremities 5/5 on the right side between 3/5 upper and lower extremities. Labs from today were reviewed and showed hemoglobin 12.6 WBCs 5.8 platelet count 942. Today sodium 139 potassium 4.5 chloride 111 bicarbonate 24 BUN 24 creatinine 1.13 compared to 1.19 and compared to 1.5 on February 26, 2022 upon admission. Assessment:  1. Ischemic stroke with right-sided weakness. Post transarterial thrombectomy. 2.  Yesterday he underwent carotid bifurcation angioplasty and stenting left side. 3.  So far, neurological examination has been the same since he came to the ICU. 4.  Acute renal insufficiency, improved and resolved. Creatinine within normal and good, urine output. 4.  Urinary retention. Has baseline retention and on Flomax at home. Already been started in the hospital but still having retention. 5.  Blood pressure within desired parameters. Plan:  1. Continue antiplatelet therapy. 2.  Neurochecks to 4 hours and he can be transferred out of ICU. 3. continue hypertensive medication.   #4 continue tamsulosin, for the time being place [facility] catheter.

## 2023-03-03 NOTE — PROGRESS NOTES
03/03/23 0302   CPAP/BIPAP   CPAP/BIPAP Start/Stop On   Device Mode CPAP   $$ CPAP Daily Yes   [employer] ID # [id number]   Mask Type and Size Large; Full face   PIP Observed 8.2 cm H20   EPAP (cm H2O) 8 cm H2O   Vt Spont (ml) 492 ml   Ve Observed (l/min) 8.3 l/min   Total RR (Spontaneous) 17 breaths per minute   Leak (Estimated) 52.2 L/min   Alarm Settings   Apnea 20   Low Ve 3   High Rate 35   Low Rate 8

## 2023-03-03 NOTE — PROGRESS NOTES
Neurocritical Care Brief Progress Note:  57-year-old male with acute left MCA CVA due to left MCA occlusion with associated hemorrhagic conversion s/p left MCA thrombectomy on 23. Left ICA stenosis s/p left carotid bifurcation angioplasty and stenting o  with Dr. Sherley Jaimes.       Physical Exam:  Gen: NAD, calm, cooperative  Neuro: A&O to person , year, and place. Follows commands. Can clearly say yes/no with prompting. Affect normal. PERRL, 3 mm bilaterally. Blinks to threat. No disconjugate gaze present. EOMI. Mild right facial droop. . Palate symmetric. Tongue midline. Lola spontaneously. Strength 5/5 in left UE and LE BL. Strength 3.5 in right UE with drift. Bulk and tone normal. No involuntary movements. Gait deferred. Sensation intact to light touch x 4. Toes down going. Skin: Warm, dry, color appropriate for ethnicity. NIHSS  1a  Level of consciousness: 0=alert; keenly responsive   1b. LOC questions:  0=Answers both questions correctly   1c. LOC commands: 0=Performs both tasks correctly   2. Best Gaze: 0=normal   3. Visual: 0=No visual loss   4. Facial Palsy: 1=Minor paralysis (flattened nasolabial fold, asymmetric on smiling)   5a. Motor left arm: 0=No drift, arm holds 90 (or 45) degrees for full 10 seconds   5b. Motor right arm: 1=Drift, arm holds 90 (or 45) degrees but drifts down before full 10 seconds: does not hit bed. 6a. Motor left le=No drift; leg holds 30-degree position for full 5 seconds. 6b  Motor right le=No drift; leg holds 30-degree position for full 5 seconds. 7. Limb Ataxia: 0=Absent   8. Sensory: 0=Normal; no sensory loss   9. Best Language:  2=Severe aphasia; all communication is through fragmentary expression;great need for inference,questioning and guessing by the listener. Range of information that can be exchanged is limited;listerner carries burden of communication. Examiner cannot identify materials provided from patient response.     10. Dysarthria: 0=Normal   11. Extinction and Inattention: 0=No abnormality    Total:    4        PLAN:   -Continue DAPT with  mg po q day and Plavix 75 mg po q day. -Continue Lipitor 40 mg po q hs.   -P2Y12 186 and Aspirin level 401. -Repeat [facility] 03/02 @ 04:29 showed evolving left MCA infarct with evolving  petechial hemorrhage.  Very little change  -Continue frequent neuro checks    Min Redman NP  Neurocritical Care Nurse Practitioner  [phone]

## 2023-03-04 DIAGNOSIS — I65.23 BILATERAL CAROTID ARTERY STENOSIS: Primary | ICD-10-CM

## 2023-03-04 LAB
ANION GAP SERPL CALC-SCNC: 7 MMOL/L (ref 5–15)
BUN SERPL-MCNC: 29 MG/DL (ref 6–20)
BUN/CREAT SERPL: 21 (ref 12–20)
CALCIUM SERPL-MCNC: 8.4 MG/DL (ref 8.5–10.1)
CHLORIDE SERPL-SCNC: 108 MMOL/L (ref 97–108)
CO2 SERPL-SCNC: 25 MMOL/L (ref 21–32)
CREAT SERPL-MCNC: 1.39 MG/DL (ref 0.7–1.3)
ERYTHROCYTE [DISTWIDTH] IN BLOOD BY AUTOMATED COUNT: 13.2 % (ref 11.5–14.5)
GLUCOSE SERPL-MCNC: 90 MG/DL (ref 65–100)
HCT VFR BLD AUTO: 37.3 % (ref 36.6–50.3)
HGB BLD-MCNC: 12.5 G/DL (ref 12.1–17)
MAGNESIUM SERPL-MCNC: 2.3 MG/DL (ref 1.6–2.4)
MCH RBC QN AUTO: 32 PG (ref 26–34)
MCHC RBC AUTO-ENTMCNC: 33.5 G/DL (ref 30–36.5)
MCV RBC AUTO: 95.4 FL (ref 80–99)
NRBC # BLD: 0 K/UL (ref 0–0.01)
NRBC BLD-RTO: 0 PER 100 WBC
PHOSPHATE SERPL-MCNC: 3.5 MG/DL (ref 2.6–4.7)
PLATELET # BLD AUTO: 181 K/UL (ref 150–400)
PMV BLD AUTO: 9.4 FL (ref 8.9–12.9)
POTASSIUM SERPL-SCNC: 3.8 MMOL/L (ref 3.5–5.1)
RBC # BLD AUTO: 3.91 M/UL (ref 4.1–5.7)
SODIUM SERPL-SCNC: 140 MMOL/L (ref 136–145)
WBC # BLD AUTO: 7.4 K/UL (ref 4.1–11.1)

## 2023-03-04 PROCEDURE — 74011000250 HC RX REV CODE- 250: Performed by: ANESTHESIOLOGY

## 2023-03-04 PROCEDURE — 74011250636 HC RX REV CODE- 250/636: Performed by: ANESTHESIOLOGY

## 2023-03-04 PROCEDURE — 74011000250 HC RX REV CODE- 250: Performed by: HOSPITALIST

## 2023-03-04 PROCEDURE — 83735 ASSAY OF MAGNESIUM: CPT

## 2023-03-04 PROCEDURE — 74011000250 HC RX REV CODE- 250: Performed by: NURSE PRACTITIONER

## 2023-03-04 PROCEDURE — 65270000046 HC RM TELEMETRY

## 2023-03-04 PROCEDURE — 80048 BASIC METABOLIC PNL TOTAL CA: CPT

## 2023-03-04 PROCEDURE — 94660 CPAP INITIATION&MGMT: CPT

## 2023-03-04 PROCEDURE — 74011250637 HC RX REV CODE- 250/637

## 2023-03-04 PROCEDURE — 74011250637 HC RX REV CODE- 250/637: Performed by: NURSE PRACTITIONER

## 2023-03-04 PROCEDURE — 36415 COLL VENOUS BLD VENIPUNCTURE: CPT

## 2023-03-04 PROCEDURE — 84100 ASSAY OF PHOSPHORUS: CPT

## 2023-03-04 PROCEDURE — 74011250637 HC RX REV CODE- 250/637: Performed by: INTERNAL MEDICINE

## 2023-03-04 PROCEDURE — 74011000250 HC RX REV CODE- 250: Performed by: INTERNAL MEDICINE

## 2023-03-04 PROCEDURE — 85027 COMPLETE CBC AUTOMATED: CPT

## 2023-03-04 PROCEDURE — 74011250636 HC RX REV CODE- 250/636: Performed by: INTERNAL MEDICINE

## 2023-03-04 RX ADMIN — SODIUM CHLORIDE, PRESERVATIVE FREE 10 ML: 5 INJECTION INTRAVENOUS at 05:58

## 2023-03-04 RX ADMIN — CLOPIDOGREL BISULFATE 75 MG: 75 TABLET ORAL at 08:54

## 2023-03-04 RX ADMIN — SODIUM CHLORIDE, PRESERVATIVE FREE 10 ML: 5 INJECTION INTRAVENOUS at 22:00

## 2023-03-04 RX ADMIN — SODIUM CHLORIDE, PRESERVATIVE FREE 10 ML: 5 INJECTION INTRAVENOUS at 13:09

## 2023-03-04 RX ADMIN — ATORVASTATIN CALCIUM 40 MG: 40 TABLET, FILM COATED ORAL at 21:48

## 2023-03-04 RX ADMIN — ENOXAPARIN SODIUM 30 MG: 100 INJECTION SUBCUTANEOUS at 21:48

## 2023-03-04 RX ADMIN — TAMSULOSIN HYDROCHLORIDE 0.4 MG: 0.4 CAPSULE ORAL at 08:54

## 2023-03-04 RX ADMIN — ENOXAPARIN SODIUM 30 MG: 100 INJECTION SUBCUTANEOUS at 08:54

## 2023-03-04 RX ADMIN — SODIUM CHLORIDE, PRESERVATIVE FREE 10 ML: 5 INJECTION INTRAVENOUS at 13:08

## 2023-03-04 RX ADMIN — BUMETANIDE 1 MG: 0.25 INJECTION INTRAMUSCULAR; INTRAVENOUS at 21:48

## 2023-03-04 RX ADMIN — ALTEPLASE 1 MG: 2.2 INJECTION, POWDER, LYOPHILIZED, FOR SOLUTION INTRAVENOUS at 05:27

## 2023-03-04 RX ADMIN — ASPIRIN 81 MG CHEWABLE TABLET 324 MG: 81 TABLET CHEWABLE at 08:54

## 2023-03-04 RX ADMIN — BUMETANIDE 1 MG: 0.25 INJECTION INTRAMUSCULAR; INTRAVENOUS at 08:55

## 2023-03-04 RX ADMIN — LISINOPRIL 40 MG: 20 TABLET ORAL at 08:54

## 2023-03-04 RX ADMIN — SODIUM CHLORIDE, PRESERVATIVE FREE 10 ML: 5 INJECTION INTRAVENOUS at 05:28

## 2023-03-04 NOTE — PROGRESS NOTES
0730 Bedside shift change report given to Brit Garrett RN (oncoming nurse) by Fuentes Schwarz RN (offgoing nurse). Report included the following information SBAR, Kardex, Intake/Output, MAR, Recent Results, Cardiac Rhythm SR, and Alarm Parameters . 26 Pt's daughter at bedside, requesting physician to come by and speak with her regarding CXR and Echo results. Dr. Lila Kwon notified via [employer] serve. [phone] with on call  regarding pt's daughter wanting pt to be transferred to rehab as soon as possible.  given pt's daughter's phone # to speak with her about which inpatient rehab they would like pt to go to as he was approved for two.     1400 Dr. Lila Kwon at bedside speaking with pt and pt's family. 1523 Pt refused CHG bath, educated on importance but still refusing. Pt's linen changed and hess catheter and PICC site cleansed with CHG wipe. 1740 Attempted to call [facility] to give report 2x with no answer. Will try again soon. 1744 Attempted to call [facility] to give report, told the room has not been assigned to a nurse yet. Asked them to have RN call back when it is assigned. TRANSFER - OUT REPORT:    Verbal report given to RN(name) on Junito Isidro III  being transferred to [facility](unit) for routine progression of care       Report consisted of patients Situation, Background, Assessment and   Recommendations(SBAR). Information from the following report(s) SBAR, Kardex, Intake/Output, MAR, Recent Results, Cardiac Rhythm SB-SR, and Alarm Parameters  was reviewed with the receiving nurse.     Lines:   PICC Triple Lumen 71/75/97 Left;Cephalic (Active)   Central Line Being Utilized Yes 03/04/23 1600   Criteria for Appropriate Use Limited/no vessel suitable for conventional peripheral access 03/04/23 1600   Site Assessment Clean, dry, & intact 03/04/23 1600   Phlebitis Assessment 0 03/04/23 1600   Infiltration Assessment 0 03/04/23 1600   Date of Last Dressing Change 03/03/23 03/04/23 1600   Dressing Status Clean, dry, & intact 03/04/23 1600   Dressing Type Transparent w/CHG gel 03/04/23 1600   Action Taken Open ports on tubing capped 03/04/23 1600   Hub Color/Line Status Gray;Flushed;Capped 03/04/23 1600   Positive Blood Return (Site #1) Yes 03/04/23 1600   Hub Color/Line Status White;Flushed;Capped 03/04/23 1600   Positive Blood Return (Site #2) Yes 03/04/23 1600   Hub Color/Line Status Red;Flushed;Capped 03/04/23 1600   Positive Blood Return (Site #3) Yes 03/04/23 1600   Alcohol Cap Used Yes 03/04/23 1600        Opportunity for questions and clarification was provided.       Patient transported with:   Monitor  Registered Nurse

## 2023-03-04 NOTE — PROGRESS NOTES
Neurointerventional Surgery Progress Note  Neurocritical Care NP      Admit Date: 2/26/2023   LOS: 6 days      Daily Progress Note: 3/4/2023    Assessment:   Acute Left MCA Ischemic CVA due to left MCA occlusion with associated hemorrhagic conversion, s/p left MCA thrombectomy 0/79/67, severe LICA stenosis- LICA angioplasty with stenting 3/2 by Dr. Gus Tolbert.     Plan:      - Cont DAPT with  mg and Plavix 75 mg daily   - P2Y12 and aspirin assays are therapeutic, 186 and 402 3/2  - Lipid panel shows .2, continue Lipitor 40 mg QHS  - Hgb A1C ok 5.5  - SBP goal 100-140 post stenting   - ECHO shows EF 60-65%. Trace tricuspid valve regurgitation   - follow up with Dr. Gus Tolbert in 6 months with repeat bilateral carotid duplex after discharge   - every 4 hour Neuro checks  - Neurology signed off  - NIS signing off, please call if needed     Plan d/w Dr. Gus Tolbert, Dr. Nani Pickering, and RN. Downgraded to [facility]. Initiate discharge planning. HPI: Makenzie Escamilla is a 81 y.o. male with PMH significant for HTN, pre-diabetes, and high cholesterol who initially presented to [facility]/27/23 with right-sided weakness and aphasia. The wife reported that he was fine the night before. The next morning around 6:30 am she noticed that the TV was still on in the living room and found the patient on the floor with difficulty talking. 911 was called and the patient was brought in to the ED as a stroke alert. CT of Head showed a left MCA territory infarct and probable MCA thrombus. No intracranial hemorrhage. CTA of H/N showed occlusion of the distal left MCA segment and severe stenosis of left cervical ICA. Mild stenosis right cervical ICA. NIS consulted and the patient was deemed a candidate for mechanical thrombectomy. He was not a candidate for TNK due to LKW time. He is not taking any blood thinners at home. He was transferred to [address] [place] [address] for neurointervention. He was emergently taken to the angio suite for thrombectomy.  NIHSS 18 prior to procedure. He has no prior history of a stroke. Subjective:     No neuro events overnight. Patient still with severe aphasia. Unable to obtain a ROS due to aphasia.      Current Facility-Administered Medications   Medication Dose Route Frequency Provider Last Rate Last Admin    tamsulosin (FLOMAX) capsule 0.4 mg  0.4 mg Oral DAILY Henry Thorne NP   0.4 mg at 03/04/23 0854    bumetanide (BUMEX) injection 1 mg  1 mg IntraVENous Q12H Kendell Diaz MD   1 mg at 03/04/23 0855    lisinopriL (PRINIVIL, ZESTRIL) tablet 40 mg  40 mg Oral DAILY Eleanor Kaufman MD   40 mg at 03/04/23 1204    aspirin chewable tablet 324 mg  324 mg Oral DAILY Santana Alba NP   324 mg at 03/04/23 0854    clopidogreL (PLAVIX) tablet 75 mg  75 mg Oral DAILY Santana Alba NP   75 mg at 03/04/23 0854    labetaloL (NORMODYNE;TRANDATE) injection 20 mg  20 mg IntraVENous Q2H PRN Henry Thorne NP   20 mg at 03/03/23 1407    bacitracin 500 unit/gram packet 1 Packet  1 Packet Topical PRN Eleanor Kaufman MD        alteplase (CATHFLO) 1 mg in sterile water (preservative free) 1 mL injection  1 mg InterCATHeter PRN Eleanor Kaufman MD   1 mg at 03/04/23 0527    atorvastatin (LIPITOR) tablet 40 mg  40 mg Oral QHS DWAYNE Schmidt   40 mg at 03/03/23 2208    sodium chloride (NS) flush 5-40 mL  5-40 mL IntraVENous Q8H Nidia Carolina MD   10 mL at 03/04/23 0558    sodium chloride (NS) flush 5-40 mL  5-40 mL IntraVENous PRN Nidia Carolina MD        acetaminophen (TYLENOL) tablet 650 mg  650 mg Oral Q6H PRN Nidia Carolina MD   650 mg at 03/02/23 1833    Or    acetaminophen (TYLENOL) suppository 650 mg  650 mg Rectal Q6H PRN Nidia aCrolina MD   650 mg at 02/27/23 0438    polyethylene glycol (MIRALAX) packet 17 g  17 g Oral DAILY PRN Nidia Caity, MD        ondansetron (ZOFRAN ODT) tablet 4 mg  4 mg Oral Q8H PRN Nidia Carolina MD        Or    ondansetron (ZOFRAN) injection 4 mg  4 mg IntraVENous Q6H PRALE Doshi MD        sodium chloride (NS) flush 5-40 mL  5-40 mL IntraVENous Q8H Vashti Mccarthy, NP   10 mL at 23 0528    sodium chloride (NS) flush 5-40 mL  5-40 mL IntraVENous PRN Vashit Mccarthy NP        enoxaparin (LOVENOX) injection 30 mg  30 mg SubCUTAneous Q12H Peter Doshi MD   30 mg at 23 0854    hydrALAZINE (APRESOLINE) 20 mg/mL injection 20 mg  20 mg IntraVENous Q6H PRN Vashti Mccarthy NP   20 mg at 23 1133        No Known Allergies    Review of Systems:  Review of systems not obtained due to patient factors. Objective:     Vital signs  Temp (24hrs), Av.6 °F (37 °C), Min:98 °F (36.7 °C), Max:99 °F (37.2 °C)   701 - 1900  In: -   Out: 60 [Urine:60]  1901 -  07  In: 760 [P.O.:760]  Out: 5910 [Urine:5910]    Visit Vitals  /72 (BP 1 Location: Right lower arm, BP Patient Position: At rest)   Pulse (!) 59   Temp 99 °F (37.2 °C)   Resp 16   Ht 5' 8\" (1.727 m)   Wt 324 lb 11.8 oz (147.3 kg)   SpO2 91%   BMI 49.38 kg/m²    O2 Flow Rate (L/min): 2 l/min O2 Device: None (Room air)       Intake/Output Summary (Last 24 hours) at 3/4/2023 09  Last data filed at 3/4/2023 0800  Gross per 24 hour   Intake 240 ml   Output 5320 ml   Net -5080 ml      Physical Exam:  GENERAL: Calm, cooperative, adult  male  Resp: lungs clear to auscultation anteriorly  CV: RRR, no murmur, click, rub, or gallop  SKIN: Skin warm and dry to touch. Appropriate for ethnicity. Neurologic Exam:  Mental Status:             Alert, unable to assess orientation questions due to aphasia. Repetitive speech, states yes at times. Language:                   Severe expressive aphasia and some receptive aphasia. Repeats yes, does not answer questions appropriately. Will follow some commands. Cranial Nerves:                                                 PERRL. Mild blink to threat in right eye (diminished). Blinks to threat in left eye. EOMs intact. Mild right facial droop. Motor:                          Right-sided weakness present. RUE drift present. No drift in RLE. Difficult o fully assess strength. Able to  slightly with right hand. LUE appears 5/5 strength. BLE with good strength and appears 5/5 in both legs. No involuntary movements. Sensation:                   Sensation appears to be intact. Withdraws to pain in all extremities. No neglect appreciated. Coordination & Gait:   Unable to perform FTN in right arm due to weakness. FTN intact in LUE. HTS intact bilaterally. Gait deferred.      NIHSS:      1a-LOC:0    1b-Month/Age:2    1c-Open/Close Hand:0    2-Best Gaze:0    3-Visual Fields:1    4-Facial Palsy:1    5a-Left Arm:0    5b-Right Arm:1    6a-Left Le    6b-Right Le    7-Limb Ataxia:0    8-Sensory:0    9-Best Language:2    10-Dysarthria:2    11-Extinction/Inattention:0  TOTAL SCORE:9      24 hour results:  Recent Results (from the past 12 hour(s))   CBC W/O DIFF    Collection Time: 23  4:38 AM   Result Value Ref Range    WBC 7.4 4.1 - 11.1 K/uL    RBC 3.91 (L) 4.10 - 5.70 M/uL    HGB 12.5 12.1 - 17.0 g/dL    HCT 37.3 36.6 - 50.3 %    MCV 95.4 80.0 - 99.0 FL    MCH 32.0 26.0 - 34.0 PG    MCHC 33.5 30.0 - 36.5 g/dL    RDW 13.2 11.5 - 14.5 %    PLATELET 910 031 - 643 K/uL    MPV 9.4 8.9 - 12.9 FL    NRBC 0.0 0  WBC    ABSOLUTE NRBC 0.00 0.00 - 8.77 K/uL   METABOLIC PANEL, BASIC    Collection Time: 23  4:38 AM   Result Value Ref Range    Sodium 140 136 - 145 mmol/L    Potassium 3.8 3.5 - 5.1 mmol/L    Chloride 108 97 - 108 mmol/L    CO2 25 21 - 32 mmol/L    Anion gap 7 5 - 15 mmol/L    Glucose 90 65 - 100 mg/dL    BUN 29 (H) 6 - 20 MG/DL    Creatinine 1.39 (H) 0.70 - 1.30 MG/DL    BUN/Creatinine ratio 21 (H) 12 - 20      eGFR 55 (L) >60 ml/min/1.73m2 Calcium 8.4 (L) 8.5 - 10.1 MG/DL   MAGNESIUM    Collection Time: 03/04/23  4:38 AM   Result Value Ref Range    Magnesium 2.3 1.6 - 2.4 mg/dL   PHOSPHORUS    Collection Time: 03/04/23  4:38 AM   Result Value Ref Range    Phosphorus 3.5 2.6 - 4.7 MG/DL     Imaging:  [facility] 2/26/23 at 0822: CT evidence of left MCA territory infarct and probable MCA thrombus. No intracranial hemorrhage. CTA: Occlusion of distal M1 segment of left MCA. Left MCA territory infarction. Severe stenosis left cervical ICA. Mild stenosis right cervical ICA. [facility] 2/26/23 at 1330:    Decreased evidence of left MCA territory infarct. No intracranial hemorrhage. Post thrombectomy changes are expected. [facility] 2/28/23 at 0132:Evolving left middle cerebral artery infarct with hemorrhagic transformation. No  significant mass effect or midline shift. [facility] 3/2/23 at 0429: Very little change in the left MCA infarct and petechial hemorrhage. MRI: Acute left MCA territory infarcts with associated hemorrhagic transformation. Bilateral carotid duplex 2/26/23: Consistent with less than 50% stenosis of the right internal carotid artery. Consistent with greater than 70% stenosis of the left internal carotid artery.     Court Quijano NP  Neurocritical Care Nurse Practitioner

## 2023-03-04 NOTE — PROGRESS NOTES
Neurocritical Care Brief Progress Note:  77-year-old male with acute left MCA CVA due to left MCA occlusion with associated hemorrhagic conversion s/p left MCA thrombectomy on 23. Left ICA stenosis s/p left carotid bifurcation angioplasty and stenting o  with Dr. Rashmi Cottrell.     No acute neuro events overnight. Physical Exam:  Gen: NAD, calm, cooperative  Neuro: A&O to person,year and place. Follows commands. Can clearly say yes/no with prompting. Affect normal. PERRL, 3 mm bilaterally. Blinks to threat. No disconjugate gaze present. EOMI. Mild right facial droop. . Palate symmetric. Tongue midline. Lola spontaneously. Strength 5/5 in left UE and LE. Strength 3/5 in right UE with drift and LE. Bulk and tone normal. No involuntary movements. Gait deferred. Sensation intact to light touch x 4. Toes down going. Skin: Warm, dry, color appropriate for ethnicity. NIHSS:   1a-LOC:0    1b-Month/Age:0    1c-Open/Close Hand:0    2-Best Gaze:0    3-Visual Fields:0    4-Facial Palsy:1    5a-Left Arm:0    5b-Right Arm:1    6a-Left Le    6b-Right Le    7-Limb Ataxia:0    8-Sensory:0    9-Best Language:2    10-Dysarthria:0    11-Extinction/Inattention:0  TOTAL SCORE:4       RESULTS:   -P2Y12 186 (3/2 @16:00) and Aspirin level 402 (3/2 @16:00)   -Repeat [facility]  @ 04:29 showed evolving left MCA infarct with evolving petechial hemorrhage, very little change. PLAN:   -Continue DAPT with  mg po q day and Plavix 75 mg po q day. -Continue Lipitor 40 mg po q hs.   -Continue Q 4hr neuro checks  -Downgraded to [facility], transfer out of ICU when bed becomes available.       Gypsy Hill, [address] [facility]  Neurocritical Care Nurse Practitioner  [phone]

## 2023-03-04 NOTE — PROGRESS NOTES
JASSI- [facility] pending ins auth. JAKE reviewed [employer] and noted that [address] both have accepted this pt for IPR. JAKE spoke with pt's daughter, Kimmy Cortes ([phone]) to discuss. She stated that he would like to go to [facility] in [place]. JAKE sent a message to [address] [place] [address] to inform them and called Reshma Levin ([phone]), liason with [facility]. She said that they will start the insurance auth.

## 2023-03-04 NOTE — PROGRESS NOTES
[address] [facility] Adult  Hospitalist Group                                                                                          Hospitalist Progress Note  Gaviota Bagley MD  Answering service: [phone] OR [phone] from in house phone        Date of Service:  3/4/2023  NAME:  Bran Lo III  :  1950  MRN:  [id number]       Admission Summary:   81 y.o. male with PMH significant for HTN, pre-diabetes, and high cholesterol who initially presented to [facility] 23 with right-sided weakness and aphasia. The wife reported that he was fine the night before. The next morning around 6:30 am she noticed that the TV was still on in the living room and found the patient on the floor with difficulty talking. 911 was called and the patient was brought in to the ED as a stroke alert. CT of Head showed a left MCA territory infarct and probable MCA thrombus. No intracranial hemorrhage. CTA of H/N showed occlusion of the distal left MCA segment and severe stenosis of left cervical ICA. Mild stenosis right cervical ICA. NIS consulted and the patient was deemed a candidate for mechanical thrombectomy. He was not a candidate for TNK due to LKW time. He is not taking any blood thinners at home. He was transferred to [address] [place] [address] for neurointervention. He was emergently taken to the angio suite for thrombectomy. NIHSS 18 prior to procedure. He has no prior history of a stroke. Interval history / Subjective:   Seen and examined, family at bedside feel like he's the best he's been since admission, speech somewhat improving as is motor function. The patient denies pain, dyspnea, n/v/d. He has a hess in place. Assessment & Plan:     Acute ischemic stroke due to left MCA occlusion with associated hemorrhagic conversion. Severe L ICA stenosis  -Status post left MCA thrombectomy   -Status post left ICA angioplasty with stenting on 3/2  -Continue aspirin, Plavix and atorvastatin.   -PT and OT evaluated, rehab recommended. Insurance auth pending for [employer]. -Neurologist, NIS recommendations appreciated  -Echocardiogram with normal EF, 60-65%. Normal left ventricular size, wall thickness, wall motion. Nondiagnostic bubble study however no bubbles seen in the left-sided chambers.  -Neurochecks per protocol. Hypertension, controlled. Off Cardene drip. Continue lisinopril. Peripheral edema, wet cough  -Check CXR - mild pulm edema - continue bumetanide     Urinary retention:  -tamsulosin resumed  -consider voiding trial tomorrow      Status: Full  DVT PPX: Lovenox  Discharge disposition: IPR pending insurance auth  Cardiac monitoring: Telemetry/NSTU     Hospital Problems  Never Reviewed            Codes Class Noted POA    Stroke [facility]) ICD-10-CM: I63.9  ICD-9-CM: 434.91  2/26/2023 Unknown           Social Determinants of Health     Tobacco Use: Low Risk     Smoking Tobacco Use: Never    Smokeless Tobacco Use: Never    Passive Exposure: Never   Alcohol Use: Not on file   Financial Resource Strain: Not on file   Food Insecurity: Not on file   Transportation Needs: Not on file   Physical Activity: Not on file   Stress: Not on file   Social Connections: Not on file   Intimate Partner Violence: Not on file   Depression: Not on file   Housing Stability: Not on file       Review of Systems:   A comprehensive review of systems was negative except for that written in the HPI. Vital Signs:    Last 24hrs VS reviewed since prior progress note.  Most recent are:  Visit Vitals  /70   Pulse 60   Temp 98.1 °F (36.7 °C)   Resp 19   Ht 5' 8\" (1.727 m)   Wt 147.3 kg (324 lb 11.8 oz)   SpO2 95%   BMI 49.38 kg/m²         Intake/Output Summary (Last 24 hours) at 3/4/2023 1814  Last data filed at 3/4/2023 1600  Gross per 24 hour   Intake --   Output 4625 ml   Net -4625 ml        Physical Examination:     I had a face to face encounter with this patient and independently examined them on 3/4/2023 as outlined below:          General : nad morbidly obese  HEENT: anicteric sclerae  Neck: supple, no JVD, no meningeal signs  Chest: Clear to auscultation bilaterally   CVS: RRR no MRG 2+ b/l LE edema  Abd: soft/ non tender, bs+, hess in place  Ext: no clubbing, no cyanosis,  Neuro/Psych: R-sided weakness, dysarthria and aphasia, not anxious nor agitated  Skin: warm     Data Review:    Review and/or order of clinical lab test  Review and/or order of tests in the radiology section of CPT      I have personally and independently reviewed all pertinent labs, diagnostic studies, imaging, and have provided independent interpretation of the same. Labs:     Recent Labs     03/04/23 0438 03/03/23  0444   WBC 7.4 5.8   HGB 12.5 12.6   HCT 37.3 38.0    152     Recent Labs     03/04/23 0438 03/03/23 0444 03/02/23  0501    139 143   K 3.8 4.5 3.8    111* 114*   CO2 25 24 24   BUN 29* 24* 26*   CREA 1.39* 1.13 1.19   GLU 90 124* 113*   CA 8.4* 8.3* 8.1*   MG 2.3 2.5* 2.3   PHOS 3.5 3.7 3.3     No results for input(s): ALT, AP, TBIL, TBILI, TP, ALB, GLOB, GGT, AML, LPSE in the last 72 hours. No lab exists for component: SGOT, GPT, AMYP, HLPSE  No results for input(s): INR, PTP, APTT, INREXT in the last 72 hours. No results for input(s): FE, TIBC, PSAT, FERR in the last 72 hours. No results found for: FOL, RBCF   No results for input(s): PH, PCO2, PO2 in the last 72 hours. No results for input(s): CPK, CKNDX, TROIQ in the last 72 hours.     No lab exists for component: CPKMB  Lab Results   Component Value Date/Time    Cholesterol, total 178 02/26/2023 02:49 PM    HDL Cholesterol 42 02/26/2023 02:49 PM    LDL, calculated 107.2 (H) 02/26/2023 02:49 PM    Triglyceride 144 02/26/2023 02:49 PM    CHOL/HDL Ratio 4.2 02/26/2023 02:49 PM     No results found for: GLUCPOC  No results found for: COLOR, APPRN, SPGRU, REFSG, CHETAN, PROTU, GLUCU, Frederich Maroon, BILU, UROU, CANDICE, LEUKU, GLUKE, EPSU, BACTU, WBCU, RBCU, CASTS, RY    Notes reviewed from all clinical/nonclinical/nursing services involved in patient's clinical care. Care coordination discussions were held with appropriate clinical/nonclinical/ nursing providers based on care coordination needs. Patients current active Medications were reviewed, considered, added and adjusted based on the clinical condition today. Home Medications were reconciled to the best of my ability given all available resources at the time of admission. Route is PO if not otherwise noted.       Admission Status:[id number]:::1}      Medications Reviewed:     Current Facility-Administered Medications   Medication Dose Route Frequency    tamsulosin (FLOMAX) capsule 0.4 mg  0.4 mg Oral DAILY    bumetanide (BUMEX) injection 1 mg  1 mg IntraVENous Q12H    lisinopriL (PRINIVIL, ZESTRIL) tablet 40 mg  40 mg Oral DAILY    aspirin chewable tablet 324 mg  324 mg Oral DAILY    clopidogreL (PLAVIX) tablet 75 mg  75 mg Oral DAILY    labetaloL (NORMODYNE;TRANDATE) injection 20 mg  20 mg IntraVENous Q2H PRN    bacitracin 500 unit/gram packet 1 Packet  1 Packet Topical PRN    alteplase (CATHFLO) 1 mg in sterile water (preservative free) 1 mL injection  1 mg InterCATHeter PRN    atorvastatin (LIPITOR) tablet 40 mg  40 mg Oral QHS    sodium chloride (NS) flush 5-40 mL  5-40 mL IntraVENous Q8H    sodium chloride (NS) flush 5-40 mL  5-40 mL IntraVENous PRN    acetaminophen (TYLENOL) tablet 650 mg  650 mg Oral Q6H PRN    Or    acetaminophen (TYLENOL) suppository 650 mg  650 mg Rectal Q6H PRN    polyethylene glycol (MIRALAX) packet 17 g  17 g Oral DAILY PRN    ondansetron (ZOFRAN ODT) tablet 4 mg  4 mg Oral Q8H PRN    Or    ondansetron (ZOFRAN) injection 4 mg  4 mg IntraVENous Q6H PRN    sodium chloride (NS) flush 5-40 mL  5-40 mL IntraVENous Q8H    sodium chloride (NS) flush 5-40 mL  5-40 mL IntraVENous PRN    enoxaparin (LOVENOX) injection 30 mg  30 mg SubCUTAneous Q12H    hydrALAZINE (APRESOLINE) 20 mg/mL injection 20 mg  20 mg IntraVENous Q6H PRN     ______________________________________________________________________  EXPECTED LENGTH OF STAY: 4d 2h  ACTUAL LENGTH OF STAY:          6                 Jennifer Latham MD

## 2023-03-04 NOTE — PROGRESS NOTES
Addendum to progress note. Right groin site clean, dry, and intact with mild bruising. No hematoma or bleeding noted. Distal foot pulses palpable. Generalized peripheral non-pitting edema. Skin warm to touch. Addendum: [id number] Updated patient's son at bedside on plan of care. Educated him on groin site precautions. Educated him on stroke-like symptoms and to call 911 immediately. Instructed patient's son to seek ER services if patient experiences the worst headache of his life.      [facility]  Neurocritical care NP/Neurointerventional Surgery

## 2023-03-04 NOTE — PROGRESS NOTES
03/04/23 0046   Oxygen Therapy   O2 Sat (%) 93 %   Pulse via Oximetry 61 beats per minute   O2 Device None (Room air)   CPAP/BIPAP   CPAP/BIPAP Start/Stop Off  (Pt refused CPAP, States he does not use at home, stable on room air, will monitor)   $$ CPAP Daily Yes  (on S/B in room)

## 2023-03-05 PROCEDURE — 74011250637 HC RX REV CODE- 250/637: Performed by: NURSE PRACTITIONER

## 2023-03-05 PROCEDURE — 74011250637 HC RX REV CODE- 250/637: Performed by: INTERNAL MEDICINE

## 2023-03-05 PROCEDURE — 74011250636 HC RX REV CODE- 250/636: Performed by: ANESTHESIOLOGY

## 2023-03-05 PROCEDURE — 74011000250 HC RX REV CODE- 250: Performed by: ANESTHESIOLOGY

## 2023-03-05 PROCEDURE — 65270000046 HC RM TELEMETRY

## 2023-03-05 PROCEDURE — 74011000250 HC RX REV CODE- 250: Performed by: HOSPITALIST

## 2023-03-05 PROCEDURE — 74011250637 HC RX REV CODE- 250/637

## 2023-03-05 PROCEDURE — 74011000250 HC RX REV CODE- 250: Performed by: NURSE PRACTITIONER

## 2023-03-05 RX ADMIN — SODIUM CHLORIDE, PRESERVATIVE FREE 10 ML: 5 INJECTION INTRAVENOUS at 16:51

## 2023-03-05 RX ADMIN — SODIUM CHLORIDE, PRESERVATIVE FREE 10 ML: 5 INJECTION INTRAVENOUS at 21:32

## 2023-03-05 RX ADMIN — SODIUM CHLORIDE, PRESERVATIVE FREE 10 ML: 5 INJECTION INTRAVENOUS at 06:00

## 2023-03-05 RX ADMIN — ATORVASTATIN CALCIUM 40 MG: 40 TABLET, FILM COATED ORAL at 21:32

## 2023-03-05 RX ADMIN — ENOXAPARIN SODIUM 30 MG: 100 INJECTION SUBCUTANEOUS at 10:48

## 2023-03-05 RX ADMIN — BUMETANIDE 1 MG: 0.25 INJECTION INTRAMUSCULAR; INTRAVENOUS at 21:32

## 2023-03-05 RX ADMIN — BUMETANIDE 1 MG: 0.25 INJECTION INTRAMUSCULAR; INTRAVENOUS at 10:48

## 2023-03-05 RX ADMIN — ENOXAPARIN SODIUM 30 MG: 100 INJECTION SUBCUTANEOUS at 21:32

## 2023-03-05 RX ADMIN — TAMSULOSIN HYDROCHLORIDE 0.4 MG: 0.4 CAPSULE ORAL at 10:47

## 2023-03-05 RX ADMIN — SODIUM CHLORIDE, PRESERVATIVE FREE 10 ML: 5 INJECTION INTRAVENOUS at 21:33

## 2023-03-05 RX ADMIN — LISINOPRIL 40 MG: 20 TABLET ORAL at 10:47

## 2023-03-05 RX ADMIN — ASPIRIN 81 MG CHEWABLE TABLET 324 MG: 81 TABLET CHEWABLE at 10:47

## 2023-03-05 RX ADMIN — CLOPIDOGREL BISULFATE 75 MG: 75 TABLET ORAL at 10:47

## 2023-03-05 NOTE — PROGRESS NOTES
[facility]  [facility]ospitalist Group                                                                                          Hospitalist Progress Note  Guillermo Rangel MD  Answering service: [phone] OR [phone] from in house phone        Date of Service:  3/5/2023  NAME:  Clementina Filed III  :  1950  MRN:  [id number]       Admission Summary:   81 y.o. male with PMH significant for HTN, pre-diabetes, and high cholesterol who initially presented to [facility] 23 with right-sided weakness and aphasia. The wife reported that he was fine the night before. The next morning around 6:30 am she noticed that the TV was still on in the living room and found the patient on the floor with difficulty talking. 911 was called and the patient was brought in to the ED as a stroke alert. CT of Head showed a left MCA territory infarct and probable MCA thrombus. No intracranial hemorrhage. CTA of H/N showed occlusion of the distal left MCA segment and severe stenosis of left cervical ICA. Mild stenosis right cervical ICA. NIS consulted and the patient was deemed a candidate for mechanical thrombectomy. He was not a candidate for TNK due to LKW time. He is not taking any blood thinners at home. He was transferred to [facility] for neurointervention. He was emergently taken to the angio suite for thrombectomy. NIHSS 18 prior to procedure. He has no prior history of a stroke. Interval history / Subjective:   No acute events overnight, he is a limited historian due to aphasia. Assessment & Plan:     Acute ischemic stroke due to left MCA occlusion with associated hemorrhagic conversion. Severe L ICA stenosis  -Status post left MCA thrombectomy   -Status post left ICA angioplasty with stenting on 3/2  -Continue aspirin, Plavix and atorvastatin. -PT and OT evaluated, rehab recommended. Insurance auth pending for [employer]. -Neurologist, NIS recommendations appreciated  -Echocardiogram with normal EF, 60-65%. Normal left ventricular size, wall thickness, wall motion. Nondiagnostic bubble study however no bubbles seen in the left-sided chambers.  -Neurochecks per protocol. Hypertension, controlled. Off Cardene drip. Continue lisinopril. Peripheral edema, wet cough  -Check CXR - mild pulm edema - continue bumetanide     Urinary retention:  -tamsulosin resumed  -voiding trial today 3/5     Status: Full  DVT PPX: Lovenox  Discharge disposition: IPR pending insurance auth  Cardiac monitoring: Telemetry/NSTU     Hospital Problems  Never Reviewed            Codes Class Noted POA    Stroke [facility]) ICD-10-CM: I63.9  ICD-9-CM: 434.91  2/26/2023 Unknown         Social Determinants of Health     Tobacco Use: Low Risk     Smoking Tobacco Use: Never    Smokeless Tobacco Use: Never    Passive Exposure: Never   Alcohol Use: Not on file   Financial Resource Strain: Not on file   Food Insecurity: Not on file   Transportation Needs: Not on file   Physical Activity: Not on file   Stress: Not on file   Social Connections: Not on file   Intimate Partner Violence: Not on file   Depression: Not on file   Housing Stability: Not on file       Review of Systems:   A comprehensive review of systems was negative except for that written in the HPI. Vital Signs:    Last 24hrs VS reviewed since prior progress note.  Most recent are:  Visit Vitals  /77 (BP 1 Location: Right upper arm, BP Patient Position: Lying)   Pulse 67   Temp 98.1 °F (36.7 °C)   Resp 12   Ht 5' 8\" (1.727 m)   Wt 143.8 kg (317 lb 1.6 oz)   SpO2 94%   BMI 48.21 kg/m²         Intake/Output Summary (Last 24 hours) at 3/5/2023 1127  Last data filed at 3/5/2023 0800  Gross per 24 hour   Intake --   Output 4135 ml   Net -4135 ml          Physical Examination:     I had a face to face encounter with this patient and independently examined them on 3/5/2023 as outlined below:          General : nad morbidly obese  HEENT: anicteric sclerae  Neck: supple, no JVD, no meningeal signs  Chest: Clear to auscultation bilaterally   CVS: RRR no MRG 2+ b/l LE edema  Abd: soft/ non tender, bs+, hess in place  Ext: no clubbing, no cyanosis,  Neuro/Psych: R-sided weakness, dysarthria and aphasia, not anxious nor agitated  Skin: warm     Data Review:    Review and/or order of clinical lab test  Review and/or order of tests in the radiology section of [facility]      I have personally and independently reviewed all pertinent labs, diagnostic studies, imaging, and have provided independent interpretation of the same. Labs:     Recent Labs     03/04/23 0438 03/03/23 0444   WBC 7.4 5.8   HGB 12.5 12.6   HCT 37.3 38.0    152       Recent Labs     03/04/23 0438 03/03/23 0444    139   K 3.8 4.5    111*   CO2 25 24   BUN 29* 24*   CREA 1.39* 1.13   GLU 90 124*   CA 8.4* 8.3*   MG 2.3 2.5*   PHOS 3.5 3.7       No results for input(s): ALT, AP, TBIL, TBILI, TP, ALB, GLOB, GGT, AML, LPSE in the last 72 hours. No lab exists for component: SGOT, GPT, AMYP, HLPSE  No results for input(s): INR, PTP, APTT, INREXT, INREXT in the last 72 hours. No results for input(s): FE, TIBC, PSAT, FERR in the last 72 hours. No results found for: FOL, RBCF   No results for input(s): PH, PCO2, PO2 in the last 72 hours. No results for input(s): CPK, CKNDX, TROIQ in the last 72 hours. No lab exists for component: CPKMB  Lab Results   Component Value Date/Time    Cholesterol, total 178 02/26/2023 02:49 PM    HDL Cholesterol 42 02/26/2023 02:49 PM    LDL, calculated 107.2 (H) 02/26/2023 02:49 PM    Triglyceride 144 02/26/2023 02:49 PM    CHOL/HDL Ratio 4.2 02/26/2023 02:49 PM     No results found for: GLUCPOC  No results found for: COLOR, APPRN, SPGRU, REFSG, CHETAN, PROTU, GLUCU, KETU, BILU, UROU, CANDICE, LEUKU, GLUKE, EPSU, BACTU, WBCU, RBCU, CASTS, UCRY    Notes reviewed from all clinical/nonclinical/nursing services involved in patient's clinical care.  Care coordination discussions were held with appropriate clinical/nonclinical/ nursing providers based on care coordination needs. Patients current active Medications were reviewed, considered, added and adjusted based on the clinical condition today. Home Medications were reconciled to the best of my ability given all available resources at the time of admission. Route is PO if not otherwise noted.       Admission Status:[id number]:::1}      Medications Reviewed:     Current Facility-Administered Medications   Medication Dose Route Frequency    tamsulosin (FLOMAX) capsule 0.4 mg  0.4 mg Oral DAILY    bumetanide (BUMEX) injection 1 mg  1 mg IntraVENous Q12H    lisinopriL (PRINIVIL, ZESTRIL) tablet 40 mg  40 mg Oral DAILY    aspirin chewable tablet 324 mg  324 mg Oral DAILY    clopidogreL (PLAVIX) tablet 75 mg  75 mg Oral DAILY    labetaloL (NORMODYNE;TRANDATE) injection 20 mg  20 mg IntraVENous Q2H PRN    bacitracin 500 unit/gram packet 1 Packet  1 Packet Topical PRN    alteplase (CATHFLO) 1 mg in sterile water (preservative free) 1 mL injection  1 mg InterCATHeter PRN    atorvastatin (LIPITOR) tablet 40 mg  40 mg Oral QHS    sodium chloride (NS) flush 5-40 mL  5-40 mL IntraVENous Q8H    sodium chloride (NS) flush 5-40 mL  5-40 mL IntraVENous PRN    acetaminophen (TYLENOL) tablet 650 mg  650 mg Oral Q6H PRN    Or    acetaminophen (TYLENOL) suppository 650 mg  650 mg Rectal Q6H PRN    polyethylene glycol (MIRALAX) packet 17 g  17 g Oral DAILY PRN    ondansetron (ZOFRAN ODT) tablet 4 mg  4 mg Oral Q8H PRN    Or    ondansetron (ZOFRAN) injection 4 mg  4 mg IntraVENous Q6H PRN    sodium chloride (NS) flush 5-40 mL  5-40 mL IntraVENous Q8H    sodium chloride (NS) flush 5-40 mL  5-40 mL IntraVENous PRN    enoxaparin (LOVENOX) injection 30 mg  30 mg SubCUTAneous Q12H    hydrALAZINE (APRESOLINE) 20 mg/mL injection 20 mg  20 mg IntraVENous Q6H PRN     ______________________________________________________________________  EXPECTED LENGTH OF STAY: 4d 2h  ACTUAL LENGTH OF STAY:          7                 Lexi Ramires MD

## 2023-03-06 LAB
ANION GAP SERPL CALC-SCNC: 6 MMOL/L (ref 5–15)
BUN SERPL-MCNC: 32 MG/DL (ref 6–20)
BUN/CREAT SERPL: 26 (ref 12–20)
CALCIUM SERPL-MCNC: 8.5 MG/DL (ref 8.5–10.1)
CHLORIDE SERPL-SCNC: 105 MMOL/L (ref 97–108)
CO2 SERPL-SCNC: 27 MMOL/L (ref 21–32)
CREAT SERPL-MCNC: 1.24 MG/DL (ref 0.7–1.3)
GLUCOSE SERPL-MCNC: 110 MG/DL (ref 65–100)
MAGNESIUM SERPL-MCNC: 2.2 MG/DL (ref 1.6–2.4)
POTASSIUM SERPL-SCNC: 4.1 MMOL/L (ref 3.5–5.1)
SODIUM SERPL-SCNC: 138 MMOL/L (ref 136–145)

## 2023-03-06 PROCEDURE — 74011000250 HC RX REV CODE- 250: Performed by: ANESTHESIOLOGY

## 2023-03-06 PROCEDURE — 97530 THERAPEUTIC ACTIVITIES: CPT

## 2023-03-06 PROCEDURE — 97535 SELF CARE MNGMENT TRAINING: CPT

## 2023-03-06 PROCEDURE — 74011000250 HC RX REV CODE- 250: Performed by: HOSPITALIST

## 2023-03-06 PROCEDURE — 74011250637 HC RX REV CODE- 250/637: Performed by: NURSE PRACTITIONER

## 2023-03-06 PROCEDURE — 74011250637 HC RX REV CODE- 250/637

## 2023-03-06 PROCEDURE — 80048 BASIC METABOLIC PNL TOTAL CA: CPT

## 2023-03-06 PROCEDURE — 74011000250 HC RX REV CODE- 250: Performed by: NURSE PRACTITIONER

## 2023-03-06 PROCEDURE — 83735 ASSAY OF MAGNESIUM: CPT

## 2023-03-06 PROCEDURE — 74011250637 HC RX REV CODE- 250/637: Performed by: INTERNAL MEDICINE

## 2023-03-06 PROCEDURE — 74011250636 HC RX REV CODE- 250/636: Performed by: ANESTHESIOLOGY

## 2023-03-06 PROCEDURE — 65270000046 HC RM TELEMETRY

## 2023-03-06 PROCEDURE — 97112 NEUROMUSCULAR REEDUCATION: CPT

## 2023-03-06 PROCEDURE — 36415 COLL VENOUS BLD VENIPUNCTURE: CPT

## 2023-03-06 RX ADMIN — ENOXAPARIN SODIUM 30 MG: 100 INJECTION SUBCUTANEOUS at 10:38

## 2023-03-06 RX ADMIN — SODIUM CHLORIDE, PRESERVATIVE FREE 10 ML: 5 INJECTION INTRAVENOUS at 10:44

## 2023-03-06 RX ADMIN — TAMSULOSIN HYDROCHLORIDE 0.4 MG: 0.4 CAPSULE ORAL at 10:39

## 2023-03-06 RX ADMIN — SODIUM CHLORIDE, PRESERVATIVE FREE 10 ML: 5 INJECTION INTRAVENOUS at 06:00

## 2023-03-06 RX ADMIN — BUMETANIDE 1 MG: 0.25 INJECTION INTRAMUSCULAR; INTRAVENOUS at 10:38

## 2023-03-06 RX ADMIN — SODIUM CHLORIDE, PRESERVATIVE FREE 10 ML: 5 INJECTION INTRAVENOUS at 22:00

## 2023-03-06 RX ADMIN — BUMETANIDE 1 MG: 0.25 INJECTION INTRAMUSCULAR; INTRAVENOUS at 21:14

## 2023-03-06 RX ADMIN — SODIUM CHLORIDE, PRESERVATIVE FREE 10 ML: 5 INJECTION INTRAVENOUS at 14:37

## 2023-03-06 RX ADMIN — ENOXAPARIN SODIUM 30 MG: 100 INJECTION SUBCUTANEOUS at 21:14

## 2023-03-06 RX ADMIN — ATORVASTATIN CALCIUM 40 MG: 40 TABLET, FILM COATED ORAL at 21:14

## 2023-03-06 RX ADMIN — ASPIRIN 81 MG CHEWABLE TABLET 324 MG: 81 TABLET CHEWABLE at 10:38

## 2023-03-06 RX ADMIN — CLOPIDOGREL BISULFATE 75 MG: 75 TABLET ORAL at 10:38

## 2023-03-06 RX ADMIN — SODIUM CHLORIDE, PRESERVATIVE FREE 10 ML: 5 INJECTION INTRAVENOUS at 14:38

## 2023-03-06 RX ADMIN — LISINOPRIL 40 MG: 20 TABLET ORAL at 10:38

## 2023-03-06 NOTE — PROGRESS NOTES
Transition of Care Plan  RUR- Low  13%  DISPOSITION: IPR - [facility] - will need insurance auth through [facility] Medicare  F/U with PCP/Specialist    Transport: [facility]    Emergency contact: Wife Shahzad Myers [phone]     CM barriers to discharge: insurance [facility] Wh[facility] has accepted patient for IPR. Will have insurance auth started today once PT/OT notes are available. Update provided to patient and son, Sy at bedside. Patient is medically stable. DORINDA Chaney.

## 2023-03-06 NOTE — PROGRESS NOTES
Problem: Self Care Deficits Care Plan (Adult)  Goal: *Acute Goals and Plan of Care (Insert Text)  Description: FUNCTIONAL STATUS PRIOR TO ADMISSION: Patient was independent and active without use of DME. Patient was independent for basic and instrumental ADLs. Patient assists his wife at home typically. HOME SUPPORT: The patient lived with his wife but did not require assist.    Occupational Therapy Goals  Upgraded 3/3/2023  1. Patient will perform grooming standing at sink with contact guard assistance within 7 day(s). 2.  Patient will perform lower body dressing with moderate assistance within 7 day(s). 3.  Patient will perform bathing with moderate assistance within 7 days. 4.  Patient will perform toilet transfers to [facility] with contact guard assistance within 7 day(s). 5.  Patient will perform all aspects of toileting with moderate assistance  within 7 day(s). 6.  Patient will participate in upper extremity therapeutic exercise/activities with supervision within 7 day(s). 7.  Patient will utilize energy conservation techniques during functional activities with verbal and visual cues within 7 day(s). 8.  Patient will participate in [facility] assessment in [facility] for ADLs within 7 days. Initiated 2/27/2023   1. Patient will perform grooming in sitting with moderate assistance  within 7 day(s). 2.  Patient will perform lower body dressing with maximal assistance within 7 day(s). 3.  Patient will perform anterior bathing from neck to thighs with minimal assistance/contact guard assist within 7 day(s). 4.  Patient will perform toilet transfers to [facility] with maximal assistance within 7 day(s). 5.  Patient will perform all aspects of toileting with moderate assistance  within 7 day(s). 6.  Patient will participate in upper extremity therapeutic exercise/activities with minimal assistance/contact guard assist within 7 day(s).     7.  Patient will utilize energy conservation techniques during functional activities with verbal and visual cues within 7 day(s). 8.  Patient will participate in [facility] assessment in [facility] for ADLs within 7 days. Outcome: Progressing Towards Goal   OCCUPATIONAL THERAPY TREATMENT  Patient: Antonio Narvaez III [de-identified]81 y.o. male)  Date: 3/6/2023  Diagnosis: Stroke [facility]) [I63.9] <principal problem not specified>      Precautions: Fall (-140)  Chart, occupational therapy assessment, plan of care, and goals were reviewed. ASSESSMENT  Patient continues with skilled OT services and is progressing towards goals. Patient remains limited by aphasia, mild impulsivity, impaired functional mobility and balance, and cognitive deficits, to include decreased attention, sequencing, and problem-solving abilities. Patient is received in bed with chucks saturated in urine. Patient transfers to bedside chair to participate in seated / standing bathing activity, using R hand with min hand-over-hand assist and patient initiation of compensatory trunk movements to facilitate increased range of motion. Patient maintains fair standing balance for bathing, however ultimately requires use of LUE and therapist assist for effectiveness of hygiene. Patient making steady gains in visual attention, command-following, balance, and self-care engagement. Continue to recommend IPR at discharge to maximize functional gains and facilitate return to independent baseline. Current Level of Function Impacting Discharge (ADLs): min A - max A    Other factors to consider for discharge:          PLAN :  Patient continues to benefit from skilled intervention to address the above impairments. Continue treatment per established plan of care to address goals.     Recommend with staff: OOB to chair and toileting at [facility] with gait belt and assist x2 for safety and patient impulsivity    Recommendation for discharge: (in order for the patient to meet his/her long term goals)  Therapy 3 hours per day 5-7 days per week    This discharge recommendation:  Has been made in collaboration with the attending provider and/or case management    IF patient discharges home will need the following DME: TBD       SUBJECTIVE:   Patient stated Yeah.    OBJECTIVE DATA SUMMARY:   Cognitive/Behavioral Status:  Neurologic State: Alert  Cognition: Follows commands;Decreased command following  Perception: Verbal;Visual;Tactile  Perseveration: No perseveration noted  Safety/Judgement: Decreased insight into deficits; Decreased awareness of need for safety;Decreased awareness of need for assistance;Decreased awareness of environment; Fall prevention    Functional Mobility and Transfers for ADLs:  Bed Mobility:  Supine to Sit: Minimum assistance  Scooting: Contact guard assistance    Transfers:  Sit to Stand: Minimum assistance   Stand to Sit: Minimum assistance  Bed to Chair: Contact guard assistance;Minimum assistance;Assist x2    Balance:  Sitting: Intact  Sitting - Static: Good (unsupported)  Sitting - Dynamic: Good (unsupported)  Standing: Impaired  Standing - Static: Good  Standing - Dynamic : Fair    ADL Intervention:  Lower Body Dressing Assistance  Dressing Assistance: Maximum assistance  Protective Undergarmet: Maximum assistance;Training to use affected extremity as a gross motor assistance (threads LLE with SBA, up to mod A for LLE; max assist to pull up over hips)  Leg Crossed Method Used: No  Position Performed: Seated in chair;Standing  Cues: Verbal cues provided;Visual cues provided;Physical assistance    Toileting  Toileting Assistance: Moderate assistance  Bladder Hygiene: Minimum assistance;Training to use affected extremity as a gross motor assistance  Bowel Hygiene: Moderate assistance  Clothing Management: Moderate assistance  Cues: Verbal cues provided;Visual cues provided (physical assistance)    Cognitive Retraining  Orientation Retraining: Reorienting;Situation; Awareness of environment  Problem Solving: Identifying the task;Identifying the problem  Executive Functions: Executing cognitive plans  Organizing/Sequencing: Breaking task down  Attention to Task: Single task;Distractibility  Safety/Judgement: Decreased insight into deficits; Decreased awareness of need for safety;Decreased awareness of need for assistance;Decreased awareness of environment; Fall prevention    Neuro Re-Education & Therapeutic Activity:   Visual tracking: Patient instructed to maintain head at midline and visually track R of midline following target of therapist's finger. Patient initially with mild R cervical rotation as compensatory efforts, however responds well to verbal cue for moving eyes only. Patient follows target to track R of midline x10 and L<>R across midline x10. One instance of decreased visual attention, however responds well to redirection. Hand-over-hand ADL training: Patient maintains loose grasp of bath wipe in R hand, utilizing compensatory trunk rocking movement to facilitate cleaning of proximal RLE and groin. Patient follows up with L hand for effectiveness of task performance. Patient is able to reach behind himself with R hand, however limited supination and seemingly with increased without visual cues. Requires use of LUE and therapist assist for effectiveness of task completion. Attention: Patient follows one-step commands with up to mod cues needed for patient carryover. Patient with excellent effort throughout. Pain:  No reports. Activity Tolerance:   Fair    After treatment patient left in no apparent distress:   Sitting in chair, Call bell within reach, Bed / chair alarm activated, and Caregiver / family present    COMMUNICATION/COLLABORATION:   The patients plan of care was discussed with: Physical therapist and Registered nurse. Patient was educated regarding His deficit(s). He demonstrated Fair understanding as evidenced by nodding. Will need continued reinforcement.     Patient and/or family was verbally educated on the BE FAST acronym for signs/symptoms of CVA and TIA. BE FAST was written on patient's communication board  for visual education and reinforcement. All questions answered with patient indicating fair understanding.      Jamaica Dodson OT  Time Calculation: 23 mins

## 2023-03-06 NOTE — PROGRESS NOTES
Problem: Mobility Impaired (Adult and Pediatric)  Goal: *Acute Goals and Plan of Care (Insert Text)  Description: FUNCTIONAL STATUS PRIOR TO ADMISSION: Patient was independent and active without use of DME. 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 Wears CPAP at night. History of prior concussions from horse racing. Retired. HOME SUPPORT PRIOR TO ADMISSION: The patient lived with spouse but did not require assist.    Physical Therapy Goals  Goals re-evaluated 3/3/2023   1. Patient will move from supine to sit and sit to supine, scoot up and down, and roll side to side in bed with SBA  within 7 day(s). 2.  Patient will transfer from bed to chair and chair to bed with CGA using the least restrictive device within 7 day(s). 3.  Patient will perform sit to stand with CGA within 7 day(s). 4.  Patient will ambulate with Brooke for 20 feet with the least restrictive device within 7 day(s). 5.  Patient will improve Santoro Balance score by 7 points within 7 days. Initiated 2/27/2023  1. Patient will move from supine to sit and sit to supine, scoot up and down, and roll side to side in bed with moderate assistance  within 7 day(s). 2.  Patient will transfer from bed to chair and chair to bed with maximal assistance using the least restrictive device within 7 day(s). 3.  Patient will perform sit to stand with moderate assistance within 7 day(s). 4.  Patient will ambulate with maximal assistance for 5 feet with the least restrictive device within 7 day(s). 5.  Patient will improve Santoro Balance score by 7 points within 7 days. Outcome: Progressing Towards Goal  PHYSICAL THERAPY TREATMENT  Patient: Abbie Dennisonhouse III [de-identified]81 y.o. male)  Date: 3/6/2023  Diagnosis: Stroke [facility]) [I63.9] <principal problem not specified>      Precautions: Fall (-140)  Chart, physical therapy assessment, plan of care and goals were reviewed. ASSESSMENT  Patient continues with skilled PT services and is progressing towards goals. Continues to present with R hemiparesis (RLE 3/5), impaired coordination, R inattention, impaired balance, aphasia, impulsivity, decreased insight into deficits, decreased safety awareness, and overall decline in functional mobility. Pt eager to participate with therapy and transferred supine>sit with Brooke, sit<>stand with Brooke, and transferred bed>chair with Brooke. Performed several additional sit<>stand transfers from chair with verbal cuing for hand placement and attending to R side with fair carryover. Worked on static standing balance x2 minutes with Brooke for balance. Ended session in chair and left with all needs met and nursing updated. Recommending IPR upon discharge. Current Level of Function Impacting Discharge (mobility/balance): Brooke for transfers    Other factors to consider for discharge: independent baseline, aphasia, fall risk, poor insight, R inattention          PLAN :  Patient continues to benefit from skilled intervention to address the above impairments. Continue treatment per established plan of care. to address goals. Recommendation for discharge: (in order for the patient to meet his/her long term goals)  Therapy 3 hours per day 5-7 days per week    This discharge recommendation:  Has not yet been discussed the attending provider and/or case management    IF patient discharges home will need the following DME: to be determined (TBD)       SUBJECTIVE:   Patient stated Jennyfer Olivares.     OBJECTIVE DATA SUMMARY:   Critical Behavior:  Neurologic State: Alert  Orientation Level: Oriented X4  Cognition: Follows commands, Decreased command following  Safety/Judgement: Decreased insight into deficits, Decreased awareness of need for safety, Decreased awareness of need for assistance, Decreased awareness of environment, Fall prevention  Functional Mobility Training:  Bed Mobility:  Supine to Sit: Minimum assistance  Scooting: Contact guard assistance    Transfers:  Sit to Stand: Minimum assistance  Stand to Sit: Minimum assistance  Bed to Chair: Minimum assistance    Balance:  Sitting: Intact  Sitting - Static: Good (unsupported)  Sitting - Dynamic: Good (unsupported)  Standing: Impaired; With support  Standing - Static: Fair  Standing - Dynamic : Poor    Activity Tolerance:   Fair    After treatment patient left in no apparent distress:   Sitting in chair, Call bell within reach, Bed / chair alarm activated, and Caregiver / family present    COMMUNICATION/COLLABORATION:   The patients plan of care was discussed with: Occupational therapist and Registered nurse.      Shelly Nur PT, DPT   Time Calculation: 26 mins

## 2023-03-06 NOTE — PROGRESS NOTES
[facility] [facility] Adult  Hospitalist Group                                                                                          Hospitalist Progress Note  Nuno Red MD  Answering service: [phone] from in house phone        Date of Service:  3/6/2023  NAME:  Benny Umana III  :  1950  MRN:  [id number]       Admission Summary:   81 y.o. male with PMH significant for HTN, pre-diabetes, and high cholesterol who initially presented to [facility] 23 with right-sided weakness and aphasia. The wife reported that he was fine the night before. The next morning around 6:30 am she noticed that the TV was still on in the living room and found the patient on the floor with difficulty talking. 911 was called and the patient was brought in to the ED as a stroke alert. CT of Head showed a left MCA territory infarct and probable MCA thrombus. No intracranial hemorrhage. CTA of H/N showed occlusion of the distal left MCA segment and severe stenosis of left cervical ICA. Mild stenosis right cervical ICA. NIS consulted and the patient was deemed a candidate for mechanical thrombectomy. He was not a candidate for TNK due to LKW time. He is not taking any blood thinners at home. He was transferred to [facility] for neurointervention. He was emergently taken to the angio suite for thrombectomy. NIHSS 18 prior to procedure. He has no prior history of a stroke. Interval history / Subjective:   No acute events overnight, he is a limited historian due to aphasia. Son at bedside. Awaiting placement. Assessment & Plan:     Acute ischemic stroke due to left MCA occlusion with associated hemorrhagic conversion. Severe L ICA stenosis  -Status post left MCA thrombectomy   -Status post left ICA angioplasty with stenting on 3/2  -Continue aspirin, Plavix and atorvastatin. -PT and OT evaluated, rehab recommended. Insurance auth pending for [employer].   -Neurologist, NIS recommendations appreciated  -Echocardiogram with normal EF, 60-65%. Normal left ventricular size, wall thickness, wall motion. Nondiagnostic bubble study however no bubbles seen in the left-sided chambers.  -Neurochecks per protocol. Hypertension, controlled. Off Cardene drip. Continue lisinopril. Peripheral edema, wet cough  -Check CXR - mild pulm edema - continue bumetanide     Urinary retention:  -tamsulosin resumed  -voiding trial 3/5, hess removed     Status: Full  DVT PPX: Lovenox  Discharge disposition: IPR pending insurance auth  Cardiac monitoring: Telemetry/NSTU     Hospital Problems  Never Reviewed            Codes Class Noted POA    Stroke [facility]) ICD-10-CM: I63.9  ICD-9-CM: 434.91  2/26/2023 Unknown         Social Determinants of Health     Tobacco Use: Low Risk     Smoking Tobacco Use: Never    Smokeless Tobacco Use: Never    Passive Exposure: Never   Alcohol Use: Not on file   Financial Resource Strain: Not on file   Food Insecurity: Not on file   Transportation Needs: Not on file   Physical Activity: Not on file   Stress: Not on file   Social Connections: Not on file   Intimate Partner Violence: Not on file   Depression: Not on file   Housing Stability: Not on file       Review of Systems:   A comprehensive review of systems was negative except for that written in the HPI. Vital Signs:    Last 24hrs VS reviewed since prior progress note.  Most recent are:  Visit Vitals  BP (!) 129/109   Pulse 65   Temp 99.2 °F (37.3 °C)   Resp 15   Ht 5' 8\" (1.727 m)   Wt 143.8 kg (317 lb 1.6 oz)   SpO2 97%   BMI 48.21 kg/m²       No intake or output data in the 24 hours ending 03/06/23 1605       Physical Examination:     I had a face to face encounter with this patient and independently examined them on 3/6/2023 as outlined below:          General : nad morbidly obese  HEENT: anicteric sclerae  Neck: supple, no JVD, no meningeal signs  Chest: Clear to auscultation bilaterally   CVS: RRR no MRG 2+ b/l LE edema  Abd: soft/ non tender, bs+, hess in place  Ext: no clubbing, no cyanosis,  Neuro/Psych: R-sided weakness, dysarthria and aphasia, not anxious nor agitated  Skin: warm     Data Review:    Review and/or order of clinical lab test  Review and/or order of tests in the radiology section of CPT      I have personally and independently reviewed all pertinent labs, diagnostic studies, imaging, and have provided independent interpretation of the same. Labs:     Recent Labs     03/04/23  0438   WBC 7.4   HGB 12.5   HCT 37.3          Recent Labs     03/06/23  0207 03/04/23  0438    140   K 4.1 3.8    108   CO2 27 25   BUN 32* 29*   CREA 1.24 1.39*   * 90   CA 8.5 8.4*   MG 2.2 2.3   PHOS  --  3.5       No results for input(s): ALT, AP, TBIL, TBILI, TP, ALB, GLOB, GGT, AML, LPSE in the last 72 hours. No lab exists for component: SGOT, GPT, AMYP, HLPSE  No results for input(s): INR, PTP, APTT, INREXT, INREXT in the last 72 hours. No results for input(s): FE, TIBC, PSAT, FERR in the last 72 hours. No results found for: FOL, RBCF   No results for input(s): PH, PCO2, PO2 in the last 72 hours. No results for input(s): CPK, CKNDX, TROIQ in the last 72 hours. No lab exists for component: CPKMB  Lab Results   Component Value Date/Time    Cholesterol, total 178 02/26/2023 02:49 PM    HDL Cholesterol 42 02/26/2023 02:49 PM    LDL, calculated 107.2 (H) 02/26/2023 02:49 PM    Triglyceride 144 02/26/2023 02:49 PM    CHOL/HDL Ratio 4.2 02/26/2023 02:49 PM     No results found for: GLUCPOC  No results found for: COLOR, APPRN, SPGRU, REFSG, CHETAN, PROTU, GLUCU, KETU, BILU, UROU, CANDICE, LEUKU, GLUKE, EPSU, BACTU, WBCU, RBCU, CASTS, UCRY    Notes reviewed from all clinical/nonclinical/nursing services involved in patient's clinical care. Care coordination discussions were held with appropriate clinical/nonclinical/ nursing providers based on care coordination needs.          Patients current active Medications were reviewed, considered, added and adjusted based on the clinical condition today. Home Medications were reconciled to the best of my ability given all available resources at the time of admission. Route is PO if not otherwise noted.       Admission Status:[id number]:::1}      Medications Reviewed:     Current Facility-Administered Medications   Medication Dose Route Frequency    tamsulosin (FLOMAX) capsule 0.4 mg  0.4 mg Oral DAILY    bumetanide (BUMEX) injection 1 mg  1 mg IntraVENous Q12H    lisinopriL (PRINIVIL, ZESTRIL) tablet 40 mg  40 mg Oral DAILY    aspirin chewable tablet 324 mg  324 mg Oral DAILY    clopidogreL (PLAVIX) tablet 75 mg  75 mg Oral DAILY    labetaloL (NORMODYNE;TRANDATE) injection 20 mg  20 mg IntraVENous Q2H PRN    bacitracin 500 unit/gram packet 1 Packet  1 Packet Topical PRN    alteplase (CATHFLO) 1 mg in sterile water (preservative free) 1 mL injection  1 mg InterCATHeter PRN    atorvastatin (LIPITOR) tablet 40 mg  40 mg Oral QHS    sodium chloride (NS) flush 5-40 mL  5-40 mL IntraVENous Q8H    sodium chloride (NS) flush 5-40 mL  5-40 mL IntraVENous PRN    acetaminophen (TYLENOL) tablet 650 mg  650 mg Oral Q6H PRN    Or    acetaminophen (TYLENOL) suppository 650 mg  650 mg Rectal Q6H PRN    polyethylene glycol (MIRALAX) packet 17 g  17 g Oral DAILY PRN    ondansetron (ZOFRAN ODT) tablet 4 mg  4 mg Oral Q8H PRN    Or    ondansetron (ZOFRAN) injection 4 mg  4 mg IntraVENous Q6H PRN    sodium chloride (NS) flush 5-40 mL  5-40 mL IntraVENous Q8H    sodium chloride (NS) flush 5-40 mL  5-40 mL IntraVENous PRN    enoxaparin (LOVENOX) injection 30 mg  30 mg SubCUTAneous Q12H    hydrALAZINE (APRESOLINE) 20 mg/mL injection 20 mg  20 mg IntraVENous Q6H PRN     ______________________________________________________________________  EXPECTED LENGTH OF STAY: 4d 2h  ACTUAL LENGTH OF STAY:          8                 Nuno Red MD

## 2023-03-06 NOTE — PROGRESS NOTES
Problem: Aspiration - Risk of  Goal: *Absence of aspiration  Outcome: Progressing Towards Goal     Problem: Ischemic Stroke: Discharge Outcomes  Goal: *Hemodynamically stable  Outcome: Progressing Towards Goal  Goal: *Absence of aspiration pneumonia  Outcome: Progressing Towards Goal     Problem: Delirium Treatment  Goal: *Functional assessment restored to baseline  Outcome: Progressing Towards Goal  Goal: *Will remain free of delirium, CAM Score negative  Outcome: Progressing Towards Goal  Goal: *Cognitive status will be restored to baseline  Outcome: Progressing Towards Goal  Goal: Interventions  Outcome: Progressing Towards Goal     Problem: Patient Education: Go to Patient Education Activity  Goal: Patient/Family Education  Outcome: Progressing Towards Goal     Problem: Patient Education: Go to Patient Education Activity  Goal: Patient/Family Education  Outcome: Progressing Towards Goal     Problem: Patient Education: Go to Patient Education Activity  Goal: Patient/Family Education  Outcome: Progressing Towards Goal     Problem: Patient Education: Go to Patient Education Activity  Goal: Patient/Family Education  Outcome: Progressing Towards Goal

## 2023-03-07 VITALS
RESPIRATION RATE: 20 BRPM | HEIGHT: 68 IN | OXYGEN SATURATION: 92 % | DIASTOLIC BLOOD PRESSURE: 84 MMHG | SYSTOLIC BLOOD PRESSURE: 144 MMHG | BODY MASS INDEX: 47.74 KG/M2 | HEART RATE: 113 BPM | TEMPERATURE: 98.2 F | WEIGHT: 315 LBS

## 2023-03-07 PROCEDURE — 97530 THERAPEUTIC ACTIVITIES: CPT

## 2023-03-07 PROCEDURE — 74011250636 HC RX REV CODE- 250/636: Performed by: ANESTHESIOLOGY

## 2023-03-07 PROCEDURE — 74011000250 HC RX REV CODE- 250: Performed by: HOSPITALIST

## 2023-03-07 PROCEDURE — 74011250637 HC RX REV CODE- 250/637: Performed by: INTERNAL MEDICINE

## 2023-03-07 PROCEDURE — 97110 THERAPEUTIC EXERCISES: CPT

## 2023-03-07 PROCEDURE — 74011000250 HC RX REV CODE- 250: Performed by: ANESTHESIOLOGY

## 2023-03-07 PROCEDURE — 97112 NEUROMUSCULAR REEDUCATION: CPT | Performed by: OCCUPATIONAL THERAPIST

## 2023-03-07 PROCEDURE — 74011250637 HC RX REV CODE- 250/637: Performed by: NURSE PRACTITIONER

## 2023-03-07 PROCEDURE — 92507 TX SP LANG VOICE COMM INDIV: CPT

## 2023-03-07 PROCEDURE — 92526 ORAL FUNCTION THERAPY: CPT

## 2023-03-07 PROCEDURE — 74011000250 HC RX REV CODE- 250: Performed by: NURSE PRACTITIONER

## 2023-03-07 PROCEDURE — 74011250637 HC RX REV CODE- 250/637: Performed by: HOSPITALIST

## 2023-03-07 RX ORDER — CLOPIDOGREL BISULFATE 75 MG/1
75 TABLET ORAL DAILY
Qty: 30 TABLET | Refills: 0 | Status: SHIPPED
Start: 2023-03-08 | End: 2023-04-07

## 2023-03-07 RX ORDER — LISINOPRIL 40 MG/1
40 TABLET ORAL DAILY
Qty: 30 TABLET | Refills: 0 | Status: SHIPPED
Start: 2023-03-08 | End: 2023-04-07

## 2023-03-07 RX ORDER — ATORVASTATIN CALCIUM 40 MG/1
40 TABLET, FILM COATED ORAL
Qty: 30 TABLET | Refills: 0 | Status: SHIPPED
Start: 2023-03-07 | End: 2023-04-06

## 2023-03-07 RX ORDER — BUMETANIDE 1 MG/1
1 TABLET ORAL 2 TIMES DAILY
Status: DISCONTINUED | OUTPATIENT
Start: 2023-03-07 | End: 2023-03-07 | Stop reason: HOSPADM

## 2023-03-07 RX ORDER — BUMETANIDE 1 MG/1
1 TABLET ORAL DAILY
Qty: 30 TABLET | Refills: 0 | Status: SHIPPED
Start: 2023-03-07 | End: 2023-04-06

## 2023-03-07 RX ORDER — ASPIRIN 325 MG
325 TABLET ORAL DAILY
Qty: 30 TABLET | Refills: 0 | Status: SHIPPED
Start: 2023-03-07 | End: 2023-04-06

## 2023-03-07 RX ADMIN — ENOXAPARIN SODIUM 30 MG: 100 INJECTION SUBCUTANEOUS at 08:48

## 2023-03-07 RX ADMIN — BUMETANIDE 1 MG: 0.25 INJECTION INTRAMUSCULAR; INTRAVENOUS at 08:48

## 2023-03-07 RX ADMIN — SODIUM CHLORIDE, PRESERVATIVE FREE 10 ML: 5 INJECTION INTRAVENOUS at 14:00

## 2023-03-07 RX ADMIN — BUMETANIDE 1 MG: 1 TABLET ORAL at 17:16

## 2023-03-07 RX ADMIN — TAMSULOSIN HYDROCHLORIDE 0.4 MG: 0.4 CAPSULE ORAL at 08:48

## 2023-03-07 RX ADMIN — ASPIRIN 81 MG CHEWABLE TABLET 324 MG: 81 TABLET CHEWABLE at 08:48

## 2023-03-07 RX ADMIN — LISINOPRIL 40 MG: 20 TABLET ORAL at 08:48

## 2023-03-07 RX ADMIN — CLOPIDOGREL BISULFATE 75 MG: 75 TABLET ORAL at 08:48

## 2023-03-07 NOTE — PROGRESS NOTES
Problem: Aspiration - Risk of  Goal: *Absence of aspiration  Outcome: Progressing Towards Goal     Problem: TIA/CVA Stroke: Day 2 Until Discharge  Goal: *Ability to perform ADLs and demonstrates progressive mobility and function  Outcome: Progressing Towards Goal     Problem: Delirium Treatment  Goal: *Level of consciousness restored to baseline  Outcome: Progressing Towards Goal  Goal: *Functional assessment restored to baseline  Outcome: Progressing Towards Goal  Goal: *Cognitive status will be restored to baseline  Outcome: Progressing Towards Goal     Problem: Patient Education: Go to Patient Education Activity  Goal: Patient/Family Education  Outcome: Progressing Towards Goal     Problem: Patient Education: Go to Patient Education Activity  Goal: Patient/Family Education  Outcome: Progressing Towards Goal     Problem: TIA/CVA Stroke: Day 2 Until Discharge  Goal: Diagnostic Test/Procedures  Outcome: Resolved/Met     Problem: Ischemic Stroke: Discharge Outcomes  Goal: *Hemodynamically stable  Outcome: Resolved/Met  Goal: *Tolerating diet  Outcome: Resolved/Met  Goal: *Absence of aspiration  Outcome: Resolved/Met     Problem: Delirium Treatment  Goal: *Will remain free of delirium, CAM Score negative  Outcome: Resolved/Met

## 2023-03-07 NOTE — DISCHARGE SUMMARY
Discharge Summary     Patient: Jonathan Brown MRN: [id number]  SSN: [id number]    YOB: 1950  Age: 81 y.o. Sex: male       Admit Date: 2/26/2023    Discharge Date: 3/7/2023      Admission Diagnoses: Stroke [facility]) [I63.9]    Discharge Diagnoses:   Acute ischemic stroke due to L MCA occlusion   Hemorrhagic conversion of stroke  Severe L ICA stenosis  Hypertension  Pulmonary edema  Urinary retention  Morbid obesity       Discharge Condition: Stable    Hospital Course: 81 y.o. male with PMH significant for HTN, pre-diabetes, and high cholesterol who initially presented to [facility] 2/27/23 with right-sided weakness and aphasia. CT of Head showed a left MCA territory infarct and probable MCA thrombus. No intracranial hemorrhage. CTA of H/N showed occlusion of the distal left MCA segment and severe stenosis of left cervical ICA. Mild stenosis right cervical ICA. NIS consulted and the patient was deemed a candidate for mechanical thrombectomy. He was not a candidate for TNK due to LKW time. He is not taking any blood thinners at home. He was transferred to [facility] for neurointervention. He was emergently taken to the angio suite for thrombectomy. NIHSS 18 prior to procedure. He has no prior history of a stroke. Acute ischemic stroke due to left MCA occlusion with associated hemorrhagic conversion. Severe L ICA stenosis  -Status post left MCA thrombectomy 2/26  -Status post left ICA angioplasty with stenting on 3/2  -Continue aspirin, Plavix and atorvastatin. -PT and OT evaluated, rehab recommended  -Echocardiogram with normal EF, 60-65%. Normal left ventricular size, wall thickness, wall motion. Nondiagnostic bubble study however no bubbles seen in the left-sided chambers. Hypertension, controlled. Off Cardene drip. Stable on lisinopril.      Peripheral edema, wet cough  -CXR showed mild pulmonary edema, clinically improved with IV bumetanide, changed to PO on discharge, monitor volume state    Urinary retention:  -tamsulosin resumed and passed voiding trial on 3/5    Consults: Neurology, Pulmonary/Critical Care, and NIS    Significant Diagnostic Studies: see above  IR ANGIO CAROTID CRBRL LT INTNL    Result Date: 3/4/2023  1. Successful angioplasty and stenting of left ICA origin stenosis at the carotid bifurcation performed without distal embolic protection, as above. No immediate complications. CT HEAD WO CONT    Result Date: 3/2/2023  Very little change in the left MCA infarct and petechial hemorrhage. XR CHEST PORT    Result Date: 3/3/2023  Cardiac megaly mild interstitial edema. Disposition: IPR    No acute events overnight, limited historian due to aphasia/dysarthria  Visit Vitals  /70 (BP 1 Location: Right upper arm, BP Patient Position: Sitting)   Pulse 87   Temp 98.2 °F (36.8 °C)   Resp 17   Ht 5' 8\" (1.727 m)   Wt 143.8 kg (317 lb 1.6 oz)   SpO2 92%   BMI 48.21 kg/m²     R-sided weakness, dysphagia and dysarthria      Discharge Medications:   Current Discharge Medication List        START taking these medications    Details   bumetanide (BUMEX) 1 mg tablet Take 1 Tablet by mouth daily for 30 days. Qty: 30 Tablet, Refills: 0      clopidogreL (PLAVIX) 75 mg tab Take 1 Tablet by mouth daily for 30 days. Qty: 30 Tablet, Refills: 0      lisinopriL (PRINIVIL, ZESTRIL) 40 mg tablet Take 1 Tablet by mouth daily for 30 days. Qty: 30 Tablet, Refills: 0      aspirin (ASPIRIN) 325 mg tablet Take 1 Tablet by mouth daily for 30 days. Qty: 30 Tablet, Refills: 0           CONTINUE these medications which have CHANGED    Details   atorvastatin (LIPITOR) 40 mg tablet Take 1 Tablet by mouth nightly for 30 days. Indications: high cholesterol and high triglycerides  Qty: 30 Tablet, Refills: 0           CONTINUE these medications which have NOT CHANGED    Details   tamsulosin (Flomax) 0.4 mg capsule Take 0.4 mg by mouth daily.            STOP taking these medications       metoprolol tartrate (LOPRESSOR) 25 mg tablet Comments:   Reason for Stopping:         amLODIPine (NORVASC) 5 mg tablet Comments:   Reason for Stopping:         naproxen (NAPROSYN) 500 mg tablet Comments:   Reason for Stopping:         traMADoL (ULTRAM) 50 mg tablet Comments:   Reason for Stopping:         hydroCHLOROthiazide (HYDRODIURIL) 25 mg tablet Comments:   Reason for Stopping: Follow-up Information       Follow up With Specialties Details Why Contact Info    Roxnana Mai MD Endovascular Surgical Neuroradiology Schedule an appointment as soon as possible for a visit in 6 month(s) follow up for left carotid artery angioplasty and stenting with repeat bilaeral carotid duplex [address]  Doc [address]       None    None ([phone]) Patient stated that they have no PCP                Signed By: Rafy Webster MD     March 7, 2023      Greater than 30 minutes spent on discharge management.

## 2023-03-07 NOTE — PROGRESS NOTES
Problem: Communication Impaired (Adult)  Goal: *Acute Goals and Plan of Care (Insert Text)  Description: Speech Therapy Goals  Initiated 3/3/2023 ; re-eval 3/7/2023   1. Patient will complete automatic speech tasks with 50% accuracy with mod cues within 7 days not met/continued 3/7/2023 not met/continued 3/7/2023   2. Patient will complete simple naming (confrontation and responsive) tasks with 25% accuracy with max cues within 7 days not met/continued 3/7/2023   3. Patient will complete simple sentence completion tasks with 25% accuracy with max cues within 7 days MET/ Increased to 50% accuracy  4. Patient will follow 1-step commands with 90% accuracy with min cues within 7 days not met/continued 3/7/2023   5. Patient will answer simple y/n questions with 90% accuracy with min cues within 7 days not met/continued 3/7/2023   Initiated 2/27/23  1. Patient will participate in further diagnostic treatment regarding language. MET 3/3/2023   Outcome: Progressing Towards Goal    Problem: Dysphagia (Adult)  Goal: *Acute Goals and Plan of Care (Insert Text)  Description: Speech Therapy Goals  Initiated 2/27/23  1. Patient will tolerate baseline diet without adverse effects within 7 days. MET 3/7/2023 Patient has been tolerating an easy to chew diet/ thin liquids   Outcome: Resolved/Met   SPEECH LANGUAGE PATHOLOGY DYSPHAGIA AND SPEECH TREATMENT WEEKLY RE-ASSESSMENT  Patient: Roxy Thomas III [de-identified]81 y.o. male)  Date: 3/7/2023  Diagnosis: Stroke [facility]) [I63.9] <principal problem not specified>      Precautions: Fall (-140)    ASSESSMENT:  Patient tolerating easy to chew diet/ thin liquids. Observed patient with cookie and water bedside. He continues with mildly prolonged mastication of solid but complete oral clearance. He continues to benefit from slightly softer foods at least during hospitalization. Will complete dysphagia care plan. Patient continues with both receptive and expressive language deficits.  Patient masks comprehension of informal conversation well; however, with structured receptive language tasks, patient with difficulty following simple commands despite modeling. A communication board was in patient's room. Presented board to patient an asked hm to point to specific items which he was inconsistently successful with. Suspect he would benefit from a board with a smaller field of view (only a few pictures at a time). Speech is non-fluent and characterized by perseveration and word retrieval deficits. He has many appropriate, spontaneous verbalizations. Automatic tasks such as counting and singing \"happy birthday\" are an area of strength. Progress: patient tolerating easy to chew diet/ thin liquids. Small gains noted in expressive language tasks     PLAN:  Recommendations and Planned Interventions:  -- intensive ST for communication  -- continue easy to chew diet/ thin liquids    Patient continues to benefit from skilled intervention to address the above impairments. Continue treatment per established plan of care. Patient will be followed 3x/wk during hospitalization  Discharge Recommendations:  Rehab and Inpatient Rehab     SUBJECTIVE:   Patient alert, aphasic    OBJECTIVE:   Cognitive and Communication Status:  Neurologic State: Alert  Orientation Level:  (complicated by aphasia)  Cognition: Decreased command following (despite modeling)  Perception: Verbal, Visual, Tactile  Perseveration: No perseveration noted  Safety/Judgement: Decreased insight into deficits, Decreased awareness of need for safety, Decreased awareness of need for assistance, Decreased awareness of environment, Fall prevention    Dysphagia Treatment and Interventions:     P.O. Trials:  Patient Position: up in bed  Vocal quality prior to P.O.: No impairment  Consistency Presented: Thin liquid; Solid  How Presented: Successive swallows;Straw;Self-fed/presented     Bolus Acceptance: No impairment  Bolus Formation/Control: Impaired  Type of Impairment: Delayed;Mastication  Propulsion: No impairment  Oral Residue: None  Initiation of Swallow: No impairment  Laryngeal Elevation: Functional  Aspiration Signs/Symptoms: None           Speech Treatment and Interventions: Motor Speech:           Speech Characteristics: Word retrieval;Perseveration           Language Comprehension and Expression:  Auditory Comprehension   Auditory Impairment: Yes  One-Step Basic Commands (%): 0 %  Verbal Expression  Verbal Expression  Primary Mode of Expression: Verbal;Nonverbal - Gestures  Initiation: Impaired (%)  Automatic Speech Task: Impaired (comment) (counted 1-10, sang happy birthday)  Naming: Impaired  Confrontation (%): 0 %  Responsive (%): 0 %  Sentence Completion: Impaired  Word level (%): 30 %  Conversation: Non-fluent  Speech Characteristics: Word retrieval;Perseveration  Overall Impairment: Severe  Neuro-Linguistics:                  Pain:  Pain Scale 1: Numeric (0 - 10)  Pain Intensity 1: 0       After treatment:   Patient left in no apparent distress in bed, Call bell within reach, and Nursing notified    COMMUNICATION/EDUCATION:     The patient's plan of care including recommendations, planned interventions, and recommended diet changes were discussed with: Registered nurseIgor Rouse M.S., CCC-SLP  Time Calculation: 19 mins

## 2023-03-07 NOTE — PROGRESS NOTES
Transition of Care Plan  RUR- Low  14%  DISPOSITION: IPR - [facility]   RN to call report to #[phone]  F/U with PCP/Specialist    Transport: [facility]    Emergency contact: Wife Lupe Marks [phone]     CM barriers to discharge: insurance auth    Patient is medically stable. Patient's insurance Cherrie Resendiz is still pending at this time for IPR at [facility]. 4:15pm: Patient has received insurance auth for [employer] at [facility]. They are able to accept patient this evening after 8pm.    RN to call report to [facility] to [facility] transport scheduled for 8pm.     CM met with patient and son, Sy, and they are in agreement with discharge plan. Medicare pt has received, reviewed, and signed 2nd IM letter informing them of their right to appeal the discharge. Signed copy has been placed on pt bedside chart. Juan José Lyon M.S.JEFF.

## 2023-03-07 NOTE — PROGRESS NOTES
Problem: Self Care Deficits Care Plan (Adult)  Goal: *Acute Goals and Plan of Care (Insert Text)  Description: FUNCTIONAL STATUS PRIOR TO ADMISSION: Patient was independent and active without use of DME. Patient was independent for basic and instrumental ADLs. Patient assists his wife at home typically. HOME SUPPORT: The patient lived with his wife but did not require assist.    Occupational Therapy Goals  Upgraded 3/3/2023  1. Patient will perform grooming standing at sink with contact guard assistance within 7 day(s). 2.  Patient will perform lower body dressing with moderate assistance within 7 day(s). 3.  Patient will perform bathing with moderate assistance within 7 days. 4.  Patient will perform toilet transfers to [facility] with contact guard assistance within 7 day(s). 5.  Patient will perform all aspects of toileting with moderate assistance  within 7 day(s). 6.  Patient will participate in upper extremity therapeutic exercise/activities with supervision within 7 day(s). 7.  Patient will utilize energy conservation techniques during functional activities with verbal and visual cues within 7 day(s). 8.  Patient will participate in [facility] in [facility] for ADLs within 7 days. Initiated 2/27/2023   1. Patient will perform grooming in sitting with moderate assistance  within 7 day(s). 2.  Patient will perform lower body dressing with maximal assistance within 7 day(s). 3.  Patient will perform anterior bathing from neck to thighs with minimal assistance/contact guard assist within 7 day(s). 4.  Patient will perform toilet transfers to [facility] with maximal assistance within 7 day(s). 5.  Patient will perform all aspects of toileting with moderate assistance  within 7 day(s). 6.  Patient will participate in upper extremity therapeutic exercise/activities with minimal assistance/contact guard assist within 7 day(s).     7.  Patient will utilize energy conservation techniques during functional activities with verbal and visual cues within 7 day(s). 8.  Patient will participate in [facility] assessment in [facility] for ADLs within 7 days. Outcome: Progressing Towards Goal    OCCUPATIONAL THERAPY TREATMENT  Patient: Abbie Allen III [de-identified]81 y.o. male)  Date: 3/7/2023  Diagnosis: Stroke [facility]) [I63.9] <principal problem not specified>      Precautions: Fall (-140)  Chart, occupational therapy assessment, plan of care, and goals were reviewed. ASSESSMENT  Patient continues with skilled OT services and is progressing towards goals. Patient demonstrating good progress and receptive to instruction on functional use of right dominant UE. Focused on functional reach, picking up and placing items down on table. Current Level of Function Impacting Discharge (ADLs): min assist bed mobility    Other factors to consider for discharge: to go to rehab this date       PLAN :  Patient continues to benefit from skilled intervention to address the above impairments. Continue treatment per established plan of care to address goals. Recommend next OT session: neuro re-ed    Recommendation for discharge: (in order for the patient to meet his/her long term goals)  Therapy 3 hours per day 5-7 days per week    This discharge recommendation:  Has been made in collaboration with the attending provider and/or case management    IF patient discharges home will need the following DME:       SUBJECTIVE:   Patient stated Samira De Oliveira.     OBJECTIVE DATA SUMMARY:   Cognitive/Behavioral Status:  Neurologic State: Alert  Orientation Level:  (complicated by aphasia)  Cognition: Decreased command following (despite modeling)             Functional Mobility and Transfers for ADLs:  Bed Mobility:       Transfers:  Sit to Stand: Minimum assistance          Balance:  Sitting: Intact  Sitting - Static: Good (unsupported)  Sitting - Dynamic: Good (unsupported)  Standing - Static: Fair;Occasional  Standing - Dynamic : Poor;Constant support              Neuromuscular re-education:   Seated edge of bed facilitated weight bear on right hand and elbow, alternated leaning on left and then right, assist to abduct arm away from body and max cues to facilitate weight bear on elbow as he was using core to maintain balance    Standing instructed in shoulder flexion with UE in neutral alignment, instructed in functional reach and positioning of hand in neutral and abduction of thumb    Functional grasp reaching for water pitcher, max cues to use right versus left hand, demonstrated ability to grasp handle and hold and bring to mouth, trialed cookie, bottle and fruit cup. Initially picked up with UE pronated and hand over top, instructed on focus of neutral alignment and grasping between thumb and digit 1. Pain:  No complaint    Activity Tolerance:   Good    After treatment patient left in no apparent distress:   Supine in bed, Heels elevated for pressure relief, Call bell within reach, Caregiver / family present, and Side rails x 3    COMMUNICATION/COLLABORATION:   The patients plan of care was discussed with: Registered nurse and Case management.      JEFRY Strange/L  Time Calculation: 27 mins

## 2023-03-07 NOTE — PROGRESS NOTES
Problem: Mobility Impaired (Adult and Pediatric)  Goal: *Acute Goals and Plan of Care (Insert Text)  Description: FUNCTIONAL STATUS PRIOR TO ADMISSION: Patient was independent and active without use of DME. 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 Wears CPAP at night. History of prior concussions from horse racing. Retired. HOME SUPPORT PRIOR TO ADMISSION: The patient lived with spouse but did not require assist.    Physical Therapy Goals  Goals re-evaluated 3/3/2023   1. Patient will move from supine to sit and sit to supine, scoot up and down, and roll side to side in bed with SBA  within 7 day(s). 2.  Patient will transfer from bed to chair and chair to bed with CGA using the least restrictive device within 7 day(s). 3.  Patient will perform sit to stand with CGA within 7 day(s). 4.  Patient will ambulate with Brooke for 20 feet with the least restrictive device within 7 day(s). 5.  Patient will improve Santoro Balance score by 7 points within 7 days. Initiated 2/27/2023  1. Patient will move from supine to sit and sit to supine, scoot up and down, and roll side to side in bed with moderate assistance  within 7 day(s). 2.  Patient will transfer from bed to chair and chair to bed with maximal assistance using the least restrictive device within 7 day(s). 3.  Patient will perform sit to stand with moderate assistance within 7 day(s). 4.  Patient will ambulate with maximal assistance for 5 feet with the least restrictive device within 7 day(s). 5.  Patient will improve Santoro Balance score by 7 points within 7 days. Outcome: Progressing Towards Goal     PHYSICAL THERAPY TREATMENT  Patient: Karl Jaimes III [de-identified]81 y.o. male)  Date: 3/7/2023  Diagnosis: Stroke [facility]) [I63.9] <principal problem not specified>      Precautions: Fall (-140)  Chart, physical therapy assessment, plan of care and goals were reviewed. ASSESSMENT  Patient continues with skilled PT services and is progressing towards goals.  Pt presented in seated on chair upon arrival. Pt cooperative and agreeable to participate. Pt completed sit <> stand with minAx1 for verbal cues provided for hand placement, sequencing and safety. Pt in standing with poor ability to maintain standing balance with attempts to lift L LE during ambulation. Pt completed sit <> stand review along with standing balance exercises. Pt able to maintain static standing standing balance with light perturbations provided, LoB experienced with moderate perturbations when applied from pt's L to R side or anterior L to posterior R. When completing standing marching, pt experiences immediate LoB when attempting to balance on R LE and requirse min/modAx1 to maintain standing balance. When attempting with RW, pt demos improved balance, but still experienced occasional LoB to posterior that requires assistance to prevent fall. Pt overall continues to demo decreased standing balance that will benefit from continued physical therapy services to address along with coordination and R sided inattention. Pt's son present in room and educated on cues for exercises throughout the day to improve R sided attention. Pt continues to be a good candidate for intensive therapy in an inpatient rehab setting. Current Level of Function Impacting Discharge (mobility/balance): sit <> stand with minAx1    Other factors to consider for discharge: Good family support         PLAN :  Patient continues to benefit from skilled intervention to address the above impairments. Continue treatment per established plan of care. to address goals.     Recommendation for discharge: (in order for the patient to meet his/her long term goals)  Therapy 3 hours per day 5-7 days per week    This discharge recommendation:  Has been made in collaboration with the attending provider and/or case management    IF patient discharges home will need the following DME: to be determined (TBD)       SUBJECTIVE:   Patient stated ok.    OBJECTIVE DATA SUMMARY:   Critical Behavior:  Neurologic State: Alert  Orientation Level: Oriented X4  Cognition: Follows commands, Appropriate decision making  Safety/Judgement: Decreased insight into deficits, Decreased awareness of need for safety, Decreased awareness of need for assistance, Decreased awareness of environment, Fall prevention  Functional Mobility Training:  Bed Mobility:                    Transfers:  Sit to Stand: Minimum assistance  Stand to Sit: Minimum assistance                             Balance:  Sitting - Static: Good (unsupported)  Sitting - Dynamic: Good (unsupported)  Standing - Static: Fair;Occasional  Standing - Dynamic : Poor;Constant support    Therapeutic Exercises:   See assessment above for therex provided. Pain Rating:  Pt reported no symptoms of pain    Activity Tolerance:   Good, requires rest breaks, and observed SOB with activity    After treatment patient left in no apparent distress:   Sitting in chair, Call bell within reach, Bed / chair alarm activated, and Caregiver / family present    COMMUNICATION/COLLABORATION:   The patients plan of care was discussed with: Registered nurse.      Isaias Obregon, PT   Time Calculation: 26 mins

## 2023-03-07 NOTE — PROGRESS NOTES
PICC clean dry intact, discharge is pending auth at this time. Dressing on PICC 3/3, all ports flushed and capped. MD aware no PIV access, ok-ed to leave PICC in place until tomorrow prior to discharge.

## 2023-03-09 NOTE — ADT AUTH CERT NOTES
Comment          Patient Demographics    Patient Name   Leny Coles   [id number] Legal Sex   Male    1950 Address   [facility] [facility] [address] Phone   [phone] (Home)   [phone] (Mobile)     Patient Demographics    Patient Name   Leny Coles   [id number] Legal Sex   Male    1950 Address   [address] [place] [address] Phone   [phone] (Home)   [phone] (Mobile)     CSN:   [id number]     Admit Date: Admit Time Room Bed   2023 10:35  [59402] 01 [21102]     Attending Providers    Provider Pager From To   Eric Renteria MD  23   Allen Mina MD  23   Dwayne Kaufman MD  23   Liz Jean MD  23   Tamara Garcia MD  23     Patient Contacts    Name Relation Home Work Mobile   MISAEL GAR Spouse [phone]     Sandi Snyder [phone]       Utilization Reviews       Stroke: Ischemic - Care Day 8 (3/5/2023) by Heri Castillo       Review Entered Review Status   3/6/2023 1422 Completed      Criteria Review      Care Day: 8 Care Date: 3/5/2023 Level of Care: ICU    Guideline Day 2    Level Of Care    (X) Stroke unit, ICU, telemetry, or floor    Clinical Status    (X) * Hemodynamic stability    3/6/2023 14:20:20 EST by Heri Castillo      T max 99.5, /77, P 67, R 15, 02 SAT 94% RA    (X) * Mental status at baseline or stable    ( ) * Neurologic deficits absent or stable    3/6/2023 14:20:20 EST by Heri Castillo      Neuro/Psych: R-sided weakness, dysarthria and aphasia, not anxious nor agitated    (X) * Unimpaired swallowing    3/6/2023 14:20:20 EST by Heri Castillo      SPEECH NOTE: Pt w good tolerance of PO w mildly delayed mastication but full and timely oral clearance. Tolerated thin liquids including successive straw sips thins without any s/s of aspiration.   Appropriate for upgrade to easy to chew diet    Activity    (X) * Up to chair    (X) Possible assisted ambulation    Routes    (X) * Oral hydration    (X) * Oral medications    3/6/2023 14:20:20 EST by Ha Calderón      ASPIRIN 324MG PO QD, LIPITOR 40MG PO Q HS, PLAVIX 75MG PO QD, LISINOPRIL 40MG PO QD,    (X) * Advance diet as tolerated    3/6/2023 14:20:20 EST by Ha Calderón      EASY TO CHEW    Interventions    (X) Neurologic checks    (X) Possible physical therapy    (X) Possible occupational and speech therapy    Medications    (X) Antithrombotic therapy    3/6/2023 14:22:41 EST by Ha Calderón      LOVENOX 30MG SC Q 12 HR,    (X) Blood pressure control    * Milestone   Additional Notes   Hospitalist Progress Note       No acute events overnight, he is a limited historian due to aphasia. EXAM: General : nad morbidly obese   HEENT: anicteric sclerae   Neck: supple, no JVD, no meningeal signs   Chest: Clear to auscultation bilaterally    CVS: RRR no MRG 2+ b/l LE edema   Abd: soft/ non tender, bs+, hess in place   Ext: no clubbing, no cyanosis,   Neuro/Psych: R-sided weakness, dysarthria and aphasia, not anxious nor agitated   Skin: warm        NO LABS        Assessment & Plan:       Acute ischemic stroke due to left MCA occlusion with associated hemorrhagic conversion. Severe L ICA stenosis   -Status post left MCA thrombectomy 2/26   -Status post left ICA angioplasty with stenting on 3/2   -Continue aspirin, Plavix and atorvastatin. -PT and OT evaluated, rehab recommended. Insurance auth pending for [employer]. -Neurologist, NIS recommendations appreciated   -Echocardiogram with normal EF, 60-65%. Normal left ventricular size, wall thickness, wall motion. Nondiagnostic bubble study however no bubbles seen in the left-sided chambers.   -Neurochecks per protocol. Hypertension, controlled. Off Cardene drip. Continue lisinopril.        Peripheral edema, wet cough   -Check CXR - mild pulm edema - continue bumetanide       Urinary retention:   -tamsulosin resumed   -voiding trial today 3/5       Status: Full   DVT PPX: Lovenox   Discharge disposition: IPR pending insurance auth   Cardiac monitoring: Telemetry/NSTU       3/4/23 JAKE NOTE: JAKE reviewed [facility] and noted that [address] both have accepted this pt for IPR. JAKE spoke with pt's daughter, Michelle Simpson ([phone]) to discuss. She stated that he would like to go to [facility] in [place]. JAKE sent a message to [address] [place] [address] to inform them and called Myrna [place] ([phone]), liason with [facility]. She said that they will start the insurance auth.                 BUMEX 1MG IV Q 12 HR.,         Stroke: Ischemic - Care Day 5 (3/2/2023) by Eleuterio Leal       Review Entered Review Status   3/3/2023 1153 Completed      Criteria Review      Care Day: 5 Care Date: 3/2/2023 Level of Care: ICU    Guideline Day 2    Level Of Care    (X) Stroke unit, ICU, telemetry, or floor    3/3/2023 11:42:57 EST by Eleuterio Leal      3/2 ICU    Clinical Status    (X) * Hemodynamic stability    3/3/2023 11:42:57 EST by Naomi Caro      HR: 82 BP: 142/81    (X) * Mental status at baseline or stable    3/3/2023 11:42:57 EST by Naomi Caro      AAOx4    ( ) * Neurologic deficits absent or stable    3/3/2023 11:42:57 EST by Naomi Caro      Decreased command following;    ( ) * Unimpaired swallowing    3/3/2023 11:42:57 EST by Naomi Caro      aphasic    Activity    (X) * Up to chair    (X) Possible assisted ambulation    3/3/2023 11:42:57 EST by Naomi Caro      Up with Assistance;    Routes    (X) * Oral hydration    (X) * Oral medications    3/3/2023 11:48:55 EST by Naomi Caro      po tylenol 650 mg q6h prn given x1,  po lipitor 40 mg bedtime,  po plavix 75 mg daily,    ( ) * Advance diet as tolerated    3/3/2023 11:42:57 EST by Naomi Caro      ADULT DIET Dysphagia - Soft & Bite Sized    Interventions    (X) Neurologic checks    (X) Possible oxygen    (X) Possible physical therapy    (X) Possible occupational and speech therapy    Medications    (X) Antithrombotic therapy    3/3/2023 11:48:55 EST by Naomi Caro      po  mg daily,  sc lovenox 30 mg q12h    (X) Blood pressure control    3/3/2023 11:48:55 EST by Naomi Caro      po prinivil 20 mg given x1,  po prinivil 20 mg daily-dcd    * Milestone   Additional Notes   3/2/23 LOC IP ICU       Pertinent Updates:   PLATELET FUNCTION: 199 (L)   Activated Clotting Time (POC): 131, 215 (H)   -S/p Carotid bifurcation angioplasty and stenting: Left    No immediate complications. Anesthesia:General    Estimated Blood Loss:  30 ml. Implants:   8 mm x 40 mm Precise stent       Vitals:   99.8 65 129/75 33 86% CPAP mask/ ra 147.4 kg    HR: 57,64,64   RR: 21,16,19,23,20   O2 sat%: 93,89,92      Abnl/Pertinent Labs/Diagnostic Studies:   HGB: 13.6   HCT: 40.9   Chloride: 114 (H)   Glucose: 113 (H)   BUN: 26 (H)   BUN/Creatinine ratio: 22 (H)   Calcium: 8.1 (L)   Aspirin test: 402   -CT Head:   Very little change in the left MCA infarct and petechial hemorrhage. Physical Exam:   General:  alert, cooperative, no distress, appears stated age   Eye:  conjunctivae/corneas clear. PERRL, EOM's intact. Fundi benign   Neurologic:  right sided upper and lower extremity weakness. Dysarthria, right sided facial droop. MD Consults/Assessments & Plans:   Per Internal medicine:   NEUROLOGICAL:   CVA: s/p Carotid bifurcation angioplasty and stenting: Left   Neuro check Frequency: hourly            Asprin 324 mg PO (or 300 [place] if NPO) daily and Plavix 75mg per NGT   continue Lipitor 40 mg QHS   PT/OT/SLP evals - Need enteric access.    PRN  NC    SBP Goal of: < 140 mmHg   MAP Goal of: > 65 mmHg   Continue lisinopril 40mg Daily    Trend renal indices/ UOP   Trend Chemistry   Glycemic Control: Goal 120-180: SSI PRN   Prevent hypoglycemia   Transfusion Trigger (Hgb): <7 g/dL   Trend CBC       Per Neuro interventional Surgery:     - Plans for LICA angioplasty/stenting today               - Has been NPO since 0000 except meds               - Repeat CT head in am stable                  Medications: Iv cardene drip titrate       Orders:   weight daily, I&O q8h, observe for bleeding, OOB with assistance, SCDs      Per SLP:   patient currently NPO for angioplasty and stenting today. Patient also drowsy this morning. Will follow as medically appropriate for dysphagia and aphasia treatment post procedure      Per OT and PT:   Patient awaiting transport to angio this AM for L ICA stenting. Patient also more lethargic in AM and prefers to be seen later in the day. 2/27 Hospitalist Progress Note by Merna Parker       Review Entered Review Status   2/28/2023 1503 In Primary      Criteria Review   Critical care progress note. Patient is awake trying to talk very dysarthric very few words can be barely discerned. He nods consistently however denies headaches chest pain or dyspnea. He has been wearing his BiPAP at night, the hospital is BiPAP. Review of systems very limited nodded no to the above symptoms. Medications in the hospital reviewed. On examination he is in no distress vitals noted, afebrile. Blood pressure control systolic about 098. No respiratory distress. Head examination revealed no conjunctival pallor. Mucous membranes are moist.  The neck was supple no JVD but it was obese and difficult to assess. Chest was clear, somewhat decreased air entry but symmetrical.  Heart sounds were faint S1 and S2. Abdomen was obese, nontender. Lower extremities showed mild leg edema. Neurologically, difficult to assess orientation. Right upper and lower extremities weak.,  Neurological examination per neurology deferred  to. Labs today were reviewed and showed hemoglobin 14 WBC 7.7 platelet count 898 sodium 141 potassium 4.1 chloride under 114 bicarbonate 20 BUN 16 creatinine 1.38 compared to 1.5 yesterday.   Liver functions unremarkable. MRI performed yesterday was reported as showing:Acute left MCA territory infarcts with associated hemorrhagic  Transformation. Assessment:  1. Ischemic stroke. Right-sided weakness. Status post transarterial mechanical thrombectomy. 2.  Little improvement in speech. 3.  Blood pressure fairly well controlled. Plan:  1. Continue blood pressure control. 2.  PT OT. 3.  Aspirin and antilipid medications.

## 2023-03-10 ENCOUNTER — HOSPITAL ENCOUNTER (OUTPATIENT)
Dept: LAB | Age: 73
Discharge: HOME | End: 2023-03-10

## 2023-03-10 LAB
ANION GAP SERPL CALC-SCNC: 4 MMOL/L (ref 5–15)
BUN SERPL-MCNC: 37 MG/DL (ref 6–20)
BUN/CREAT SERPL: 29 (ref 12–20)
CA-I BLD-MCNC: 8.6 MG/DL (ref 8.5–10.1)
CHLORIDE SERPL-SCNC: 108 MMOL/L (ref 97–108)
CO2 SERPL-SCNC: 24 MMOL/L (ref 21–32)
CREAT SERPL-MCNC: 1.29 MG/DL (ref 0.7–1.3)
GLUCOSE SERPL-MCNC: 110 MG/DL (ref 65–100)
POTASSIUM SERPL-SCNC: 3.8 MMOL/L (ref 3.5–5.1)
SODIUM SERPL-SCNC: 136 MMOL/L (ref 136–145)

## 2023-03-10 PROCEDURE — 80048 BASIC METABOLIC PNL TOTAL CA: CPT

## 2023-03-15 ENCOUNTER — HOSPITAL ENCOUNTER (OUTPATIENT)
Dept: LAB | Age: 73
Discharge: HOME | End: 2023-03-15

## 2023-03-15 LAB
ANION GAP SERPL CALC-SCNC: 7 MMOL/L (ref 5–15)
BUN SERPL-MCNC: 27 MG/DL (ref 6–20)
BUN/CREAT SERPL: 19 (ref 12–20)
CA-I BLD-MCNC: 9 MG/DL (ref 8.5–10.1)
CHLORIDE SERPL-SCNC: 109 MMOL/L (ref 97–108)
CO2 SERPL-SCNC: 21 MMOL/L (ref 21–32)
CREAT SERPL-MCNC: 1.41 MG/DL (ref 0.7–1.3)
GLUCOSE SERPL-MCNC: 125 MG/DL (ref 65–100)
POTASSIUM SERPL-SCNC: 3.7 MMOL/L (ref 3.5–5.1)
SODIUM SERPL-SCNC: 137 MMOL/L (ref 136–145)

## 2023-03-15 PROCEDURE — 80048 BASIC METABOLIC PNL TOTAL CA: CPT

## 2023-06-09 ENCOUNTER — TELEPHONE (OUTPATIENT)
Age: 73
End: 2023-06-09

## 2023-06-09 NOTE — TELEPHONE ENCOUNTER
Spoke to son who stated patient was doing much better. Not totally back 100%. Reports patient is having some difficulty with right hand still. No acute problems voiced.

## 2023-08-11 DIAGNOSIS — I65.23 BILATERAL CAROTID ARTERY STENOSIS: Primary | ICD-10-CM

## 2023-09-14 ENCOUNTER — TELEPHONE (OUTPATIENT)
Age: 73
End: 2023-09-14

## 2023-09-14 NOTE — TELEPHONE ENCOUNTER
Spoke to son, who fields calls for his father since his dad's stroke. Told him that this is a second reminder that his dad's imaging and follow-up are due in September and I do not yet see any imaging scheduled. Oscar Garcia (son) the number for Central Scheduling and asked him to call us back once the doppler is scheduled. He said his mom and dad have been in [place] with their daughter, but are returning on Saturday. He said he would get these appointments scheduled for after their return.

## 2023-09-27 ENCOUNTER — TELEPHONE (OUTPATIENT)
Age: 73
End: 2023-09-27

## 2023-09-27 NOTE — TELEPHONE ENCOUNTER
Third reminder to schedule imaging and then follow-up with Dr. Kathlene Holter said that his parents are back from [place] and settled in and he appreciated the reminder and will call CS now.

## 2023-10-11 ENCOUNTER — TELEPHONE (OUTPATIENT)
Age: 73
End: 2023-10-11

## 2023-10-12 ENCOUNTER — HOSPITAL ENCOUNTER (OUTPATIENT)
Facility: HOSPITAL | Age: 73
Discharge: HOME | End: 2023-10-14
Payer: MEDICARE

## 2023-10-12 DIAGNOSIS — I65.23 BILATERAL CAROTID ARTERY STENOSIS: ICD-10-CM

## 2023-10-12 PROCEDURE — 93880 EXTRACRANIAL BILAT STUDY: CPT

## 2023-10-13 LAB
VAS LEFT CCA DIST EDV: 9.7 CM/S
VAS LEFT CCA DIST PSV: 46.6 CM/S
VAS LEFT CCA PROX EDV: 15.4 CM/S
VAS LEFT CCA PROX PSV: 60.7 CM/S
VAS LEFT ECA EDV: 11.6 CM/S
VAS LEFT ECA PSV: 83.4 CM/S
VAS LEFT ICA DIST EDV: 23 CM/S
VAS LEFT ICA DIST PSV: 54.8 CM/S
VAS LEFT ICA MID EDV: 12.6 CM/S
VAS LEFT ICA MID PSV: 32.4 CM/S
VAS LEFT ICA PROX EDV: 13.3 CM/S
VAS LEFT ICA PROX PSV: 44.4 CM/S
VAS LEFT ICA/CCA PSV: 1.2 NO UNITS
VAS LEFT VERTEBRAL EDV: 7.1 CM/S
VAS LEFT VERTEBRAL PSV: 43 CM/S
VAS RIGHT CCA DIST EDV: 10.9 CM/S
VAS RIGHT CCA DIST PSV: 47.9 CM/S
VAS RIGHT CCA PROX EDV: 4.5 CM/S
VAS RIGHT CCA PROX PSV: 51.5 CM/S
VAS RIGHT ECA EDV: 6.6 CM/S
VAS RIGHT ECA PSV: 42.2 CM/S
VAS RIGHT ICA DIST EDV: 10.7 CM/S
VAS RIGHT ICA DIST PSV: 36.5 CM/S
VAS RIGHT ICA MID EDV: 16.6 CM/S
VAS RIGHT ICA MID PSV: 57.1 CM/S
VAS RIGHT ICA PROX EDV: 7.3 CM/S
VAS RIGHT ICA PROX PSV: 41.5 CM/S
VAS RIGHT ICA/CCA PSV: 1.2 NO UNITS
VAS RIGHT VERTEBRAL EDV: 8.7 CM/S
VAS RIGHT VERTEBRAL PSV: 31.9 CM/S

## 2023-11-20 ENCOUNTER — OFFICE VISIT (OUTPATIENT)
Age: 73
End: 2023-11-20
Payer: MEDICARE

## 2023-11-20 VITALS
TEMPERATURE: 97 F | HEIGHT: 68 IN | DIASTOLIC BLOOD PRESSURE: 70 MMHG | SYSTOLIC BLOOD PRESSURE: 110 MMHG | OXYGEN SATURATION: 99 % | BODY MASS INDEX: 48.21 KG/M2 | HEART RATE: 78 BPM

## 2023-11-20 DIAGNOSIS — I65.29 STENOSIS OF CAROTID ARTERY, UNSPECIFIED LATERALITY: Primary | ICD-10-CM

## 2023-11-20 PROCEDURE — G8417 CALC BMI ABV UP PARAM F/U: HCPCS | Performed by: RADIOLOGY

## 2023-11-20 PROCEDURE — G8427 DOCREV CUR MEDS BY ELIG CLIN: HCPCS | Performed by: RADIOLOGY

## 2023-11-20 PROCEDURE — 3017F COLORECTAL CA SCREEN DOC REV: CPT | Performed by: RADIOLOGY

## 2023-11-20 PROCEDURE — 1123F ACP DISCUSS/DSCN MKR DOCD: CPT | Performed by: RADIOLOGY

## 2023-11-20 PROCEDURE — 99213 OFFICE O/P EST LOW 20 MIN: CPT | Performed by: RADIOLOGY

## 2023-11-20 PROCEDURE — G8484 FLU IMMUNIZE NO ADMIN: HCPCS | Performed by: RADIOLOGY

## 2023-11-20 PROCEDURE — 1036F TOBACCO NON-USER: CPT | Performed by: RADIOLOGY

## 2023-11-20 RX ORDER — CLOPIDOGREL BISULFATE 75 MG/1
75 TABLET ORAL DAILY
COMMUNITY
Start: 2023-11-07

## 2023-11-20 RX ORDER — ATORVASTATIN CALCIUM 40 MG/1
40 TABLET, FILM COATED ORAL DAILY
COMMUNITY

## 2023-11-20 RX ORDER — BUMETANIDE 1 MG/1
1 TABLET ORAL DAILY
COMMUNITY
Start: 2023-11-09

## 2023-11-20 RX ORDER — ESCITALOPRAM OXALATE 10 MG/1
10 TABLET ORAL DAILY
COMMUNITY
Start: 2023-09-09

## 2023-11-20 RX ORDER — LISINOPRIL 40 MG/1
40 TABLET ORAL DAILY
COMMUNITY
Start: 2023-11-09

## 2023-11-20 NOTE — PATIENT INSTRUCTIONS
Follow up in 1 year, November 2024, after imaging is completed.  See attached paperwork to schedule imaging.

## 2023-11-20 NOTE — PROGRESS NOTES
[facility] procedure follow up for s/p CVA, Bilateral carotid artery stenosis.  Son at visit with patient.  Patient reports residual slurred speech, unsteady gait and right side upper extremity weakness.  No acute problems voiced.

## 2024-02-23 NOTE — PROGRESS NOTES
Neurointerventional Surgery Clinic Note        Patient: William Becerra III MRN: [id number]  SSN: [id number]    YOB: 1950  Age: 73 y.o.  Sex: male      Subjective:      William Becerra III is a 73 y.o. male who is being seen for follow-up of a previously stented left carotid bifurcation stenosis.  Patient presents to the clinic with a interval new carotid duplex study.    Past Medical History:   Diagnosis Date    Arthritis     Chronic kidney disease     Chronic pain     Hx of mixed hyperlipidemia     Hypertension     Impaired fasting glucose     Sleep apnea     Stroke (HCC)     Thromboembolus (HCC)      Past Surgical History:   Procedure Laterality Date    COLONOSCOPY  06/13/2019    IR MECHANICAL ART THROMBECTOMY INTRACRANIAL  2/26/2023    IR MECHANICAL ART THROMBECTOMY INTRACRANIAL 2/26/2023 [facility] RAD ANGIO IR    IR MECHANICAL ART THROMBECTOMY INTRACRANIAL  02/26/2023    WISDOM TOOTH EXTRACTION Bilateral 2021    all teeth pulled      Family History   Family history unknown: Yes     Social History     Tobacco Use    Smoking status: Never    Smokeless tobacco: Never   Substance Use Topics    Alcohol use: Not Currently      Current Outpatient Medications   Medication Sig Dispense Refill    escitalopram (LEXAPRO) 10 MG tablet Take 1 tablet by mouth daily      bumetanide (BUMEX) 1 MG tablet Take 1 tablet by mouth daily      clopidogrel (PLAVIX) 75 MG tablet Take 1 tablet by mouth daily      lisinopril (PRINIVIL;ZESTRIL) 40 MG tablet Take 1 tablet by mouth daily      atorvastatin (LIPITOR) 40 MG tablet Take 1 tablet by mouth daily      tamsulosin (FLOMAX) 0.4 MG capsule Take 1 capsule by mouth daily       No current facility-administered medications for this visit.        No Known Allergies    Review of Systems:  Pertinent items are noted in the History of Present Illness.    Objective:     Vitals:    11/20/23 1043   BP: 110/70   Site: Left Upper Arm   Position: Sitting   Cuff Size: Large Adult   Pulse: 78

## 2024-10-23 ENCOUNTER — TELEPHONE (OUTPATIENT)
Age: 74
End: 2024-10-23

## 2024-10-23 NOTE — TELEPHONE ENCOUNTER
Spoke to patient's son, William Becerra.  Reminded that imaging and follow-up are due in November.  Gave number to central scheduling and NIS.  Let him know that [facility] has an expected date of around 11/20/2024.